# Patient Record
Sex: FEMALE | Race: WHITE | NOT HISPANIC OR LATINO | Employment: OTHER | URBAN - METROPOLITAN AREA
[De-identification: names, ages, dates, MRNs, and addresses within clinical notes are randomized per-mention and may not be internally consistent; named-entity substitution may affect disease eponyms.]

---

## 2017-03-08 DIAGNOSIS — N28.9 RENAL INSUFFICIENCY: ICD-10-CM

## 2017-03-08 LAB
ANION GAP SERPL CALCULATED.3IONS-SCNC: 8 MMOL/L (ref 3–14)
BUN SERPL-MCNC: 22 MG/DL (ref 7–30)
CALCIUM SERPL-MCNC: 9.2 MG/DL (ref 8.5–10.1)
CHLORIDE SERPL-SCNC: 108 MMOL/L (ref 94–109)
CO2 SERPL-SCNC: 26 MMOL/L (ref 20–32)
CREAT SERPL-MCNC: 0.85 MG/DL (ref 0.52–1.04)
CREAT UR-MCNC: 122 MG/DL
DEPRECATED CALCIDIOL+CALCIFEROL SERPL-MC: 24 UG/L (ref 20–75)
DIFFERENTIAL METHOD BLD: ABNORMAL
ERYTHROCYTE [DISTWIDTH] IN BLOOD BY AUTOMATED COUNT: 16 % (ref 10–15)
GFR SERPL CREATININE-BSD FRML MDRD: 63 ML/MIN/1.7M2
GLUCOSE SERPL-MCNC: 92 MG/DL (ref 70–99)
HCT VFR BLD AUTO: 40.7 % (ref 35–47)
HGB BLD-MCNC: 13.2 G/DL (ref 11.7–15.7)
LYMPHOCYTES # BLD AUTO: 2.1 10E9/L (ref 0.8–5.3)
LYMPHOCYTES NFR BLD AUTO: 45 %
MAGNESIUM SERPL-MCNC: 2 MG/DL (ref 1.6–2.3)
MCH RBC QN AUTO: 30.5 PG (ref 26.5–33)
MCHC RBC AUTO-ENTMCNC: 32.4 G/DL (ref 31.5–36.5)
MCV RBC AUTO: 94 FL (ref 78–100)
MICROALBUMIN UR-MCNC: 25 MG/L
MICROALBUMIN/CREAT UR: 20.82 MG/G CR (ref 0–25)
MONOCYTES # BLD AUTO: 1.1 10E9/L (ref 0–1.3)
MONOCYTES NFR BLD AUTO: 24 %
NEUTROPHILS # BLD AUTO: 1.5 10E9/L (ref 1.6–8.3)
NEUTROPHILS NFR BLD AUTO: 31 %
PHOSPHATE SERPL-MCNC: 2.3 MG/DL (ref 2.5–4.5)
PLATELET # BLD AUTO: 281 10E9/L (ref 150–450)
PLATELET # BLD EST: NORMAL 10*3/UL
POTASSIUM SERPL-SCNC: 3.5 MMOL/L (ref 3.4–5.3)
PTH-INTACT SERPL-MCNC: 25 PG/ML (ref 12–72)
RBC # BLD AUTO: 4.33 10E12/L (ref 3.8–5.2)
RBC MORPH BLD: ABNORMAL
SODIUM SERPL-SCNC: 142 MMOL/L (ref 133–144)
WBC # BLD AUTO: 4.7 10E9/L (ref 4–11)

## 2017-03-08 PROCEDURE — 82668 ASSAY OF ERYTHROPOIETIN: CPT | Mod: 90 | Performed by: INTERNAL MEDICINE

## 2017-03-08 PROCEDURE — 36415 COLL VENOUS BLD VENIPUNCTURE: CPT | Performed by: INTERNAL MEDICINE

## 2017-03-08 PROCEDURE — 84100 ASSAY OF PHOSPHORUS: CPT | Performed by: INTERNAL MEDICINE

## 2017-03-08 PROCEDURE — 83970 ASSAY OF PARATHORMONE: CPT | Performed by: INTERNAL MEDICINE

## 2017-03-08 PROCEDURE — 82306 VITAMIN D 25 HYDROXY: CPT | Performed by: INTERNAL MEDICINE

## 2017-03-08 PROCEDURE — 85025 COMPLETE CBC W/AUTO DIFF WBC: CPT | Performed by: INTERNAL MEDICINE

## 2017-03-08 PROCEDURE — 83735 ASSAY OF MAGNESIUM: CPT | Performed by: INTERNAL MEDICINE

## 2017-03-08 PROCEDURE — 80048 BASIC METABOLIC PNL TOTAL CA: CPT | Performed by: INTERNAL MEDICINE

## 2017-03-08 PROCEDURE — 99000 SPECIMEN HANDLING OFFICE-LAB: CPT | Performed by: INTERNAL MEDICINE

## 2017-03-08 PROCEDURE — 82043 UR ALBUMIN QUANTITATIVE: CPT | Performed by: INTERNAL MEDICINE

## 2017-03-09 LAB — EPO SERPL-ACNC: 9

## 2017-03-15 ENCOUNTER — OFFICE VISIT (OUTPATIENT)
Dept: OTHER | Facility: CLINIC | Age: 82
End: 2017-03-15
Attending: INTERNAL MEDICINE
Payer: MEDICARE

## 2017-03-15 VITALS
OXYGEN SATURATION: 97 % | DIASTOLIC BLOOD PRESSURE: 68 MMHG | BODY MASS INDEX: 22.14 KG/M2 | SYSTOLIC BLOOD PRESSURE: 128 MMHG | WEIGHT: 129 LBS | HEART RATE: 65 BPM

## 2017-03-15 DIAGNOSIS — I10 ESSENTIAL HYPERTENSION WITH GOAL BLOOD PRESSURE LESS THAN 130/80: ICD-10-CM

## 2017-03-15 DIAGNOSIS — N28.9 RENAL INSUFFICIENCY: ICD-10-CM

## 2017-03-15 DIAGNOSIS — E78.5 HYPERLIPIDEMIA LDL GOAL <100: ICD-10-CM

## 2017-03-15 DIAGNOSIS — M85.80 OSTEOPENIA: ICD-10-CM

## 2017-03-15 PROCEDURE — 99214 OFFICE O/P EST MOD 30 MIN: CPT | Mod: ZP | Performed by: INTERNAL MEDICINE

## 2017-03-15 PROCEDURE — 99211 OFF/OP EST MAY X REQ PHY/QHP: CPT

## 2017-03-15 NOTE — MR AVS SNAPSHOT
After Visit Summary   3/15/2017    Lori Fall    MRN: [id number]           Patient Information     Date Of Birth          6/22/1929        Visit Information        Provider Department      3/15/2017 10:00 AM Alcides Guzman MD [facility]      Today's Diagnoses     Renal insufficiency        Hyperlipidemia LDL goal <100        Osteopenia        Essential hypertension with goal blood pressure less than 130/80           Follow-ups after your visit        Additional Services     Follow-Up with Vascular Medicine                 Your next 10 appointments already scheduled     Apr 17, 2017  9:45 AM CDT   MA SCREENING DIGITAL BILATERAL with OXMA1   [facility] ([facility])    [address] [address] [phone]           Do not use any powder, lotion or deodorant under your arms or on your breast. If you do, we will ask you to remove it before your exam.  Wear comfortable, two-piece clothing.  If you have any allergies, tell your care team.  Bring any previous mammograms from other facilities or have them mailed to the breast center.              Who to contact     If you have questions or need follow up information about today's clinic visit or your schedule please contact [facility] directly at [phone].  Normal or non-critical lab and imaging results will be communicated to you by MyChart, letter or phone within 4 business days after the clinic has received the results. If you do not hear from us within 7 days, please contact the clinic through MyChart or phone. If you have a critical or abnormal lab result, we will notify you by phone as soon as possible.  Submit refill requests through [employer] or call your pharmacy and they will forward the refill request to us. Please allow 3 business days for your refill to be  "completed.          Additional Information About Your Visit        [employer] Information     [employer] lets you send messages to your doctor, view your test results, renew your prescriptions, schedule appointments and more. To sign up, go to [email or web address] . Click on \"Log in\" on the left side of the screen, which will take you to the Welcome page. Then click on \"Sign up Now\" on the right side of the page.     You will be asked to enter the access code listed below, as well as some personal information. Please follow the directions to create your username and password.     Your access code is: [email or web address]  Expires: 2017  6:01 PM     Your access code will  in 90 days. If you need help or a new code, please call your [place] clinic or [phone].        Care EveryWhere ID     This is your Care EveryWhere ID. This could be used by other organizations to access your [place] medical records  [id number]        Your Vitals Were     Pulse Pulse Oximetry Breastfeeding? BMI (Body Mass Index)          65 97% No 22.14 kg/m2         Blood Pressure from Last 3 Encounters:   03/15/17 128/68   16 128/78   16 127/65    Weight from Last 3 Encounters:   03/15/17 129 lb (58.5 kg)   16 126 lb (57.2 kg)   16 130 lb (59 kg)              We Performed the Following     Follow-Up with Vascular Medicine        Primary Care Provider Office Phone # Fax #    Alcides Guzman [phone]       [facility] [address]  [facility] [address]        Thank you!     Thank you for choosing [facility] [place]  for your care. Our goal is always to provide you with excellent care. Hearing back from our patients is one way we can continue to improve our services. Please take a few minutes to complete the written survey that you may receive in the mail after your visit with us. Thank you!             Your Updated Medication List - Protect others around you: " Learn how to safely use, store and throw away your medicines at www.disposemymeds.org.          This list is accurate as of: 3/15/17 11:59 PM.  Always use your most recent med list.                   Brand Name Dispense Instructions for use    alendronate 35 MG tablet    FOSAMAX    13 tablet    Take 1 tablet (35 mg) by mouth once a week       amLODIPine 10 MG tablet    NORVASC    90 tablet    Take 1 tablet (10 mg) by mouth daily       aspirin 81 MG tablet     90 tablet    Take 1 tablet by mouth daily.       calcium + D 600-200 MG-UNIT Tabs   Generic drug:  calcium carbonate-vitamin D      Take 2 tablets by mouth 2 times daily.       ezetimibe 10 MG tablet    ZETIA    90 tablet    Take 1 tablet (10 mg) by mouth At Bedtime       fenofibrate 160 MG tablet     90 tablet    Take 1 tablet (160 mg) by mouth daily       furosemide 40 MG tablet    LASIX    90 tablet    Take 1 tablet (40 mg) by mouth every morning       lisinopril 40 MG tablet    PRINIVIL/ZESTRIL    90 tablet    Take 1 tablet (40 mg) by mouth daily       metoprolol 100 MG tablet    LOPRESSOR    180 tablet    Take 1 tablet (100 mg) by mouth 2 times daily       simvastatin 40 MG tablet    ZOCOR    90 tablet    Take 1 tablet (40 mg) by mouth At Bedtime

## 2017-03-15 NOTE — PROGRESS NOTES
Lori Fall is a 87 year old female who is presenting at the current time to discuss her diagnosi(es) of renal insufficiency, htn, hyperlipidemia.      Review Of Systems  Skin: negative  Eyes: negative  Ears/Nose/Throat: negative  Respiratory: No shortness of breath, dyspnea on exertion, cough, or hemoptysis  Cardiovascular: negative  Gastrointestinal: negative  Genitourinary: negative  Musculoskeletal: negative  Neurologic: negative  Psychiatric: negative  Hematologic/Lymphatic/Immunologic: negative  Endocrine: negative    PAST MEDICAL HISTORY:                  Past Medical History   Diagnosis Date     Essential hypertension, benign      abstracted 7/5/02     Hyperlipidemia LDL goal < 100      Impaired fasting glucose      NONSPECIFIC MEDICAL HISTORY      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     Osteopenia      Primary thrombocytopenia 1995     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     PUD (peptic ulcer disease) 1960s     Pure hypercholesterolemia      abstracted 7/5/02       PAST SURGICAL HISTORY:                  Past Surgical History   Procedure Laterality Date     C nonspecific procedure  1995     splenectomy -- abstracted 7/5/02     C nonspecific procedure  1977     EDWIN  BSO appendectomy     C nonspecific procedure  1994     benign breast bopsy     Tonsillectomy & adenoidectomy  1956     C nonspecific procedure  6/28/2010     Left total hip arthroplasty, Bijal uncemented components       CURRENT MEDICATIONS:                  Current Outpatient Prescriptions   Medication Sig Dispense Refill     amLODIPine (NORVASC) 10 MG tablet Take 1 tablet (10 mg) by mouth daily 90 tablet 3     alendronate (FOSAMAX) 35 MG tablet Take 1 tablet (35 mg) by mouth once a week 13 tablet 3     fenofibrate 160 MG tablet Take 1 tablet (160 mg) by mouth daily 90 tablet 3     lisinopril (PRINIVIL/ZESTRIL) 40 MG tablet Take 1 tablet (40 mg) by mouth daily 90 tablet 3     metoprolol (LOPRESSOR) 100 MG tablet Take 1 tablet (100 mg)  by mouth 2 times daily 180 tablet 3     simvastatin (ZOCOR) 40 MG tablet Take 1 tablet (40 mg) by mouth At Bedtime 90 tablet 3     ezetimibe (ZETIA) 10 MG tablet Take 1 tablet (10 mg) by mouth At Bedtime 90 tablet 3     aspirin 81 MG tablet Take 1 tablet by mouth daily. 90 tablet 3     Calcium Carbonate-Vitamin D (CALCIUM + D) 600-200 MG-UNIT per tablet Take 2 tablets by mouth 2 times daily.       furosemide (LASIX) 40 MG tablet Take 1 tablet (40 mg) by mouth every morning (Patient not taking: Reported on 3/15/2017) 90 tablet 3       ALLERGIES:                  Allergies   Allergen Reactions     Aminoglycosides      neosporin onintment - rash     Hctz Hives     Penicillins      Sulfa Drugs        SOCIAL HISTORY:                  Social History     Social History     Marital status:      Spouse name: Nhan     Number of children: 1     Years of education: 12     Occupational History     retired      retired       Retired     Social History Main Topics     Smoking status: Never Smoker     Smokeless tobacco: Never Used     Alcohol use 0.0 oz/week     0 Standard drinks or equivalent per week      Comment: occasional wine     Drug use: No     Sexual activity: Yes     Partners: Male     Other Topics Concern     Not on file     Social History Narrative       FAMILY HISTORY:                   Family History   Problem Relation Age of Onset     HEART DISEASE Mother      d:age 66     HEART DISEASE Father      d:age 62     Family History Negative Brother      d:age 35  in war     CANCER Brother      d:age 53 cancer esophagus, alcoholic     HEART DISEASE Brother      b:1933 bypass surgery       Physical exam Reveals:    O/P: WNL  HEENT: WNL  NECK: No JVD, thyromegaly, or lymphadenopathy  HEART: RRR, no murmurs, gallops, or rubs  LUNGS: CTA bilaterally without rales, wheezes, or rhonchi  GI: NABS, nondistended, nontender, soft  EXT:without cyanosis, clubbing, or edema  NEURO: nonfocal  : no flank  tenderness      A/P:    (N28.9) Renal insufficiency  Comment: resolved  Plan: Follow-Up with Vascular Medicine, Basic         metabolic panel        Monitor labs in six months    (E78.5) Hyperlipidemia LDL goal <100  Comment: at goal  Plan: Follow-Up with Vascular Medicine, T4 free, T3         Free, TSH, Basic metabolic panel, Hepatic         panel, Lipid Profile, Hemoglobin A1c           (I10) Essential hypertension with goal blood pressure less than 130/80  Comment: at goal  Plan: Follow-Up with Vascular Medicine, Basic         metabolic panel        RTC six months

## 2017-05-03 ENCOUNTER — HOSPITAL ENCOUNTER (OUTPATIENT)
Dept: CT IMAGING | Facility: CLINIC | Age: 82
DRG: 378 | End: 2017-05-03
Attending: INTERNAL MEDICINE
Payer: MEDICARE

## 2017-05-03 ENCOUNTER — HOSPITAL ENCOUNTER (OUTPATIENT)
Dept: ULTRASOUND IMAGING | Facility: CLINIC | Age: 82
DRG: 378 | End: 2017-05-03
Attending: INTERNAL MEDICINE
Payer: MEDICARE

## 2017-05-03 ENCOUNTER — HOSPITAL ENCOUNTER (OUTPATIENT)
Dept: LAB | Facility: CLINIC | Age: 82
DRG: 378 | End: 2017-05-03
Attending: INTERNAL MEDICINE
Payer: MEDICARE

## 2017-05-03 ENCOUNTER — OFFICE VISIT (OUTPATIENT)
Dept: OTHER | Facility: CLINIC | Age: 82
DRG: 378 | End: 2017-05-03
Attending: INTERNAL MEDICINE
Payer: MEDICARE

## 2017-05-03 VITALS
WEIGHT: 125.6 LBS | SYSTOLIC BLOOD PRESSURE: 125 MMHG | HEART RATE: 72 BPM | BODY MASS INDEX: 21.56 KG/M2 | DIASTOLIC BLOOD PRESSURE: 65 MMHG | OXYGEN SATURATION: 98 %

## 2017-05-03 DIAGNOSIS — R10.13 ABDOMINAL PAIN, EPIGASTRIC: ICD-10-CM

## 2017-05-03 DIAGNOSIS — R10.13 ABDOMINAL PAIN, EPIGASTRIC: Primary | ICD-10-CM

## 2017-05-03 DIAGNOSIS — R00.0 BOUNDING PULSE: ICD-10-CM

## 2017-05-03 LAB
ALBUMIN SERPL-MCNC: 3.5 G/DL (ref 3.4–5)
ALBUMIN UR-MCNC: NEGATIVE MG/DL
ALP SERPL-CCNC: 45 U/L (ref 40–150)
ALT SERPL W P-5'-P-CCNC: 21 U/L (ref 0–50)
AMORPH CRY #/AREA URNS HPF: ABNORMAL /HPF
AMYLASE SERPL-CCNC: 45 U/L (ref 30–110)
ANION GAP SERPL CALCULATED.3IONS-SCNC: 3 MMOL/L (ref 3–14)
APPEARANCE UR: ABNORMAL
APTT PPP: 24 SEC (ref 22–37)
AST SERPL W P-5'-P-CCNC: 24 U/L (ref 0–45)
BASOPHILS # BLD AUTO: 0 10E9/L (ref 0–0.2)
BASOPHILS NFR BLD AUTO: 0.2 %
BILIRUB DIRECT SERPL-MCNC: 0.1 MG/DL (ref 0–0.2)
BILIRUB SERPL-MCNC: 0.5 MG/DL (ref 0.2–1.3)
BILIRUB UR QL STRIP: NEGATIVE
BUN SERPL-MCNC: 32 MG/DL (ref 7–30)
CALCIUM SERPL-MCNC: 11.4 MG/DL (ref 8.5–10.1)
CHLORIDE SERPL-SCNC: 102 MMOL/L (ref 94–109)
CO2 SERPL-SCNC: 34 MMOL/L (ref 20–32)
COLOR UR AUTO: ABNORMAL
CREAT SERPL-MCNC: 0.93 MG/DL (ref 0.52–1.04)
DIFFERENTIAL METHOD BLD: ABNORMAL
EOSINOPHIL # BLD AUTO: 0 10E9/L (ref 0–0.7)
EOSINOPHIL NFR BLD AUTO: 0.1 %
ERYTHROCYTE [DISTWIDTH] IN BLOOD BY AUTOMATED COUNT: 15.2 % (ref 10–15)
GFR SERPL CREATININE-BSD FRML MDRD: 57 ML/MIN/1.7M2
GLUCOSE SERPL-MCNC: 117 MG/DL (ref 70–99)
GLUCOSE UR STRIP-MCNC: NEGATIVE MG/DL
GRAN CASTS #/AREA URNS LPF: 1 /LPF
HCT VFR BLD AUTO: 36.4 % (ref 35–47)
HGB BLD-MCNC: 12.2 G/DL (ref 11.7–15.7)
HGB UR QL STRIP: NEGATIVE
HYALINE CASTS #/AREA URNS LPF: 4 /LPF (ref 0–2)
IMM GRANULOCYTES # BLD: 0 10E9/L (ref 0–0.4)
IMM GRANULOCYTES NFR BLD: 0.3 %
INR PPP: 1.02 (ref 0.86–1.14)
KETONES UR STRIP-MCNC: NEGATIVE MG/DL
LEUKOCYTE ESTERASE UR QL STRIP: ABNORMAL
LIPASE SERPL-CCNC: 358 U/L (ref 73–393)
LYMPHOCYTES # BLD AUTO: 1.9 10E9/L (ref 0.8–5.3)
LYMPHOCYTES NFR BLD AUTO: 16.4 %
MCH RBC QN AUTO: 30.7 PG (ref 26.5–33)
MCHC RBC AUTO-ENTMCNC: 33.5 G/DL (ref 31.5–36.5)
MCV RBC AUTO: 92 FL (ref 78–100)
MONOCYTES # BLD AUTO: 1.7 10E9/L (ref 0–1.3)
MONOCYTES NFR BLD AUTO: 14 %
MUCOUS THREADS #/AREA URNS LPF: PRESENT /LPF
NEUTROPHILS # BLD AUTO: 8.2 10E9/L (ref 1.6–8.3)
NEUTROPHILS NFR BLD AUTO: 69 %
NITRATE UR QL: NEGATIVE
NRBC # BLD AUTO: 0 10*3/UL
NRBC BLD AUTO-RTO: 0 /100
PH UR STRIP: 6.5 PH (ref 5–7)
PLATELET # BLD AUTO: 303 10E9/L (ref 150–450)
POTASSIUM SERPL-SCNC: 3.5 MMOL/L (ref 3.4–5.3)
PROT SERPL-MCNC: 6.7 G/DL (ref 6.8–8.8)
RBC # BLD AUTO: 3.97 10E12/L (ref 3.8–5.2)
RBC #/AREA URNS AUTO: 1 /HPF (ref 0–2)
SODIUM SERPL-SCNC: 139 MMOL/L (ref 133–144)
SP GR UR STRIP: 1.01 (ref 1–1.03)
SQUAMOUS #/AREA URNS AUTO: 1 /HPF (ref 0–1)
URN SPEC COLLECT METH UR: ABNORMAL
UROBILINOGEN UR STRIP-MCNC: NORMAL MG/DL (ref 0–2)
WBC # BLD AUTO: 11.8 10E9/L (ref 4–11)
WBC #/AREA URNS AUTO: 31 /HPF (ref 0–2)

## 2017-05-03 PROCEDURE — 99215 OFFICE O/P EST HI 40 MIN: CPT | Mod: ZP | Performed by: INTERNAL MEDICINE

## 2017-05-03 PROCEDURE — 93010 ELECTROCARDIOGRAM REPORT: CPT | Mod: ZP | Performed by: INTERNAL MEDICINE

## 2017-05-03 RX ORDER — IOPAMIDOL 755 MG/ML
75 INJECTION, SOLUTION INTRAVASCULAR ONCE
Status: COMPLETED | OUTPATIENT
Start: 2017-05-03 | End: 2017-05-03

## 2017-05-03 RX ADMIN — IOPAMIDOL 75 ML: 755 INJECTION, SOLUTION INTRAVENOUS at 14:03

## 2017-05-03 RX ADMIN — SODIUM CHLORIDE 70 ML: 9 INJECTION, SOLUTION INTRAVENOUS at 14:03

## 2017-05-03 NOTE — NURSING NOTE
"Chief Complaint   Patient presents with     RECHECK     severe upper abdominal/back pain       Initial /65 (BP Location: Right arm, Patient Position: Chair, Cuff Size: Adult Large)  Pulse 72  Wt 125 lb 9.6 oz (57 kg)  SpO2 98%  BMI 21.56 kg/m2 Estimated body mass index is 21.56 kg/(m^2) as calculated from the following:    Height as of 6/29/16: 5' 4\" (1.626 m).    Weight as of this encounter: 125 lb 9.6 oz (57 kg).  Medication Reconciliation: complete     Face to Face nursing time 7 minutes     Kaci Morales CMA      "

## 2017-05-03 NOTE — PROGRESS NOTES
Lori Fall is a 87 year old female who is presenting at the current time to discuss her one week history of nonexertional burning epigastric pain associated with radiation through to the back, with associated pyrosis and nausea but not emesis. Pain has been provoked by eating and occurs within one hour of eating. It is not relieved by eating, and has in fact caused her to eat less. It is not provoked with activity. No fevers, chills, night sweats, emesis, diaphoresis.      Review Of Systems  Skin: negative  Eyes: negative  Ears/Nose/Throat: negative  Respiratory: No shortness of breath, dyspnea on exertion, cough, or hemoptysis  Cardiovascular: negative  Gastrointestinal: as above  Genitourinary: negative  Musculoskeletal: negative  Neurologic: negative  Psychiatric: negative  Hematologic/Lymphatic/Immunologic: negative  Endocrine: negative    PAST MEDICAL HISTORY:                  Past Medical History:   Diagnosis Date     Essential hypertension, benign     abstracted 7/5/02     Hyperlipidemia LDL goal < 100      Impaired fasting glucose      NONSPECIFIC MEDICAL HISTORY     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     Osteopenia      Primary thrombocytopenia 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     PUD (peptic ulcer disease) 1960s     Pure hypercholesterolemia     abstracted 7/5/02       PAST SURGICAL HISTORY:                  Past Surgical History:   Procedure Laterality Date     C NONSPECIFIC PROCEDURE  1995    splenectomy -- abstracted 7/5/02     C NONSPECIFIC PROCEDURE  1977    EDWIN  BSO appendectomy     C NONSPECIFIC PROCEDURE  1994    benign breast bopsy     C NONSPECIFIC PROCEDURE  6/28/2010    Left total hip arthroplasty, Bijal uncemented components     TONSILLECTOMY & ADENOIDECTOMY  1956       CURRENT MEDICATIONS:                  Current Outpatient Prescriptions   Medication Sig Dispense Refill     amLODIPine (NORVASC) 10 MG tablet Take 1 tablet (10 mg) by mouth daily 90 tablet 3      alendronate (FOSAMAX) 35 MG tablet Take 1 tablet (35 mg) by mouth once a week 13 tablet 3     fenofibrate 160 MG tablet Take 1 tablet (160 mg) by mouth daily 90 tablet 3     lisinopril (PRINIVIL/ZESTRIL) 40 MG tablet Take 1 tablet (40 mg) by mouth daily 90 tablet 3     metoprolol (LOPRESSOR) 100 MG tablet Take 1 tablet (100 mg) by mouth 2 times daily 180 tablet 3     simvastatin (ZOCOR) 40 MG tablet Take 1 tablet (40 mg) by mouth At Bedtime 90 tablet 3     ezetimibe (ZETIA) 10 MG tablet Take 1 tablet (10 mg) by mouth At Bedtime 90 tablet 3     aspirin 81 MG tablet Take 1 tablet by mouth daily. 90 tablet 3     Calcium Carbonate-Vitamin D (CALCIUM + D) 600-200 MG-UNIT per tablet Take 2 tablets by mouth 2 times daily.         ALLERGIES:                  Allergies   Allergen Reactions     Aminoglycosides      neosporin onintment - rash     Hctz Hives     Penicillins      Sulfa Drugs        SOCIAL HISTORY:                  Social History     Social History     Marital status:      Spouse name: Nhan     Number of children: 1     Years of education: 12     Occupational History     retired      retired       Retired     Social History Main Topics     Smoking status: Never Smoker     Smokeless tobacco: Never Used     Alcohol use 0.0 oz/week     0 Standard drinks or equivalent per week      Comment: occasional wine     Drug use: No     Sexual activity: Yes     Partners: Male     Other Topics Concern     Not on file     Social History Narrative       FAMILY HISTORY:                   Family History   Problem Relation Age of Onset     HEART DISEASE Mother      d:age 66     HEART DISEASE Father      d:age 62     Family History Negative Brother      d:age 35  in war     CANCER Brother      d:age 53 cancer esophagus, alcoholic     HEART DISEASE Brother      b:1933 bypass surgery       Physical exam Reveals:    O/P: WNL  HEENT: WNL  NECK: No JVD, thyromegaly, or lymphadenopathy  HEART: RRR, no murmurs,  gallops, or rubs  LUNGS: CTA bilaterally without rales, wheezes, or rhonchi  GI: NABS, nondistended, nontender, soft, negative Chan's sign. No McBurney's point tenderness  EXT:without cyanosis, clubbing, or edema  NEURO: nonfocal  : no flank tenderness       A/P:    (R10.13) Abdominal pain, epigastric  (primary encounter diagnosis)  Comment: I am concerned about the possibility (in this order) of biliary colic, pancreatitis, aortic dissection, atypical angina due to RCA disease. We will start by ordering the below tests as stat tests.  Plan: CBC with platelets differential, Hepatic panel,        Basic metabolic panel, UA with Microscopic,         INR, Partial thromboplastin time, Lipase,         Amylase, US Abdomen Complete, EKG 12-lead         complete w/read - Clinics, EKG 12-lead complete        w/read - Clinics          If the above do not yield an actionable diagnosis, I will order a stat CT chest / abdomen / pelvis with contrast to investigate matters further.             (R00.0) Bounding right femoral pulse  Comment: 4 plus right femoral pulse, one plus left femoral pulse  Plan: check RLE arterial duplex once above w/u completed if no obvious vascular etiology to discomfort has occurred         Greater than one half the fortyfive minutes total spent on the pt's visit were spent providing education and counselling to the patient regarding the above matters.

## 2017-05-03 NOTE — MR AVS SNAPSHOT
After Visit Summary   5/3/2017    Lori Fall    MRN: [id number]           Patient Information     Date Of Birth          6/22/1929        Visit Information        Provider Department      5/3/2017 11:30 AM Alcides Guzman MD [facility]      Today's Diagnoses     Abdominal pain, epigastric    -  1    Bounding right femoral pulse           Follow-ups after your visit        Your next 10 appointments already scheduled     May 10, 2017 10:30 AM CDT   Return Visit with Alcides Guzman MD   [facility] ([facility])    [address]  [facility] [id number] [phone]              Future tests that were ordered for you today     Open Future Orders        Priority Expected Expires Ordered    D dimer quantitative Routine 5/3/2017 5/3/2017 5/3/2017    Troponin I Routine 5/3/2017 5/3/2017 5/3/2017    CT Chest Abdomen Pelvis w/o & w Contrast STAT 5/3/2017 5/3/2018 5/3/2017    EKG 12-lead complete w/read - Clinics STAT 5/3/2017 5/3/2017 5/3/2017    CBC with platelets differential STAT 5/3/2017 5/3/2017 5/3/2017    Hepatic panel STAT 5/3/2017 5/3/2017 5/3/2017    Basic metabolic panel STAT 5/3/2017 5/3/2017 5/3/2017    UA with Microscopic STAT 5/3/2017 5/3/2017 5/3/2017    INR STAT 5/3/2017 5/3/2017 5/3/2017    Partial thromboplastin time STAT 5/3/2017 5/3/2017 5/3/2017    Lipase STAT 5/3/2017 5/3/2017 5/3/2017    Amylase STAT 5/3/2017 5/3/2017 5/3/2017    US Abdomen Complete STAT 5/3/2017 5/3/2017 5/3/2017            Who to contact     If you have questions or need follow up information about today's clinic visit or your schedule please contact [facility] directly at [phone].  Normal or non-critical lab and imaging results will be communicated to you by MyChart, letter or phone within 4 business days after the  "clinic has received the results. If you do not hear from us within 7 days, please contact the clinic through [employer] or phone. If you have a critical or abnormal lab result, we will notify you by phone as soon as possible.  Submit refill requests through [employer] or call your pharmacy and they will forward the refill request to us. Please allow 3 business days for your refill to be completed.          Additional Information About Your Visit        [employer] Information     [employer] lets you send messages to your doctor, view your test results, renew your prescriptions, schedule appointments and more. To sign up, go to [email or web address] . Click on \"Log in\" on the left side of the screen, which will take you to the Welcome page. Then click on \"Sign up Now\" on the right side of the page.     You will be asked to enter the access code listed below, as well as some personal information. Please follow the directions to create your username and password.     Your access code is: [email or web address]  Expires: 2017  6:01 PM     Your access code will  in 90 days. If you need help or a new code, please call your [place] clinic or [phone].        Care EveryWhere ID     This is your Care EveryWhere ID. This could be used by other organizations to access your [place] medical records  [id number]        Your Vitals Were     Pulse Pulse Oximetry BMI (Body Mass Index)             72 98% 21.56 kg/m2          Blood Pressure from Last 3 Encounters:   17 125/65   03/15/17 128/68   16 128/78    Weight from Last 3 Encounters:   17 125 lb 9.6 oz (57 kg)   03/15/17 129 lb (58.5 kg)   16 126 lb (57.2 kg)              We Performed the Following     EKG 12-lead complete w/read - Clinics     Urine Culture Aerobic Bacterial        Primary Care Provider Office Phone # Fax #    Alcides Guzman [phone]       [facility] [address]  [place] [address]        Thank " you!     Thank you for choosing [facility]  for your care. Our goal is always to provide you with excellent care. Hearing back from our patients is one way we can continue to improve our services. Please take a few minutes to complete the written survey that you may receive in the mail after your visit with us. Thank you!             Your Updated Medication List - Protect others around you: Learn how to safely use, store and throw away your medicines at www.disposemymeds.org.          This list is accurate as of: 5/3/17  3:10 PM.  Always use your most recent med list.                   Brand Name Dispense Instructions for use    alendronate 35 MG tablet    FOSAMAX    13 tablet    Take 1 tablet (35 mg) by mouth once a week       amLODIPine 10 MG tablet    NORVASC    90 tablet    Take 1 tablet (10 mg) by mouth daily       aspirin 81 MG tablet     90 tablet    Take 1 tablet by mouth daily.       calcium + D 600-200 MG-UNIT Tabs   Generic drug:  calcium carbonate-vitamin D      Take 2 tablets by mouth 2 times daily.       ezetimibe 10 MG tablet    ZETIA    90 tablet    Take 1 tablet (10 mg) by mouth At Bedtime       fenofibrate 160 MG tablet     90 tablet    Take 1 tablet (160 mg) by mouth daily       lisinopril 40 MG tablet    PRINIVIL/ZESTRIL    90 tablet    Take 1 tablet (40 mg) by mouth daily       metoprolol 100 MG tablet    LOPRESSOR    180 tablet    Take 1 tablet (100 mg) by mouth 2 times daily       simvastatin 40 MG tablet    ZOCOR    90 tablet    Take 1 tablet (40 mg) by mouth At Bedtime

## 2017-05-04 ENCOUNTER — TELEPHONE (OUTPATIENT)
Dept: OTHER | Facility: CLINIC | Age: 82
End: 2017-05-04

## 2017-05-04 DIAGNOSIS — K27.9 PEPTIC ULCER: Primary | ICD-10-CM

## 2017-05-04 LAB
BACTERIA SPEC CULT: NORMAL
Lab: NORMAL
MICRO REPORT STATUS: NORMAL
SPECIMEN SOURCE: NORMAL

## 2017-05-04 RX ORDER — OMEPRAZOLE 40 MG/1
40 CAPSULE, DELAYED RELEASE ORAL 2 TIMES DAILY
Qty: 20 CAPSULE | Refills: 0 | Status: ON HOLD | OUTPATIENT
Start: 2017-05-04 | End: 2017-05-08

## 2017-05-06 ENCOUNTER — HOSPITAL ENCOUNTER (INPATIENT)
Facility: CLINIC | Age: 82
LOS: 2 days | Discharge: HOME | DRG: 378 | End: 2017-05-08
Attending: EMERGENCY MEDICINE | Admitting: INTERNAL MEDICINE
Payer: MEDICARE

## 2017-05-06 DIAGNOSIS — K92.2 GI BLEED: Primary | ICD-10-CM

## 2017-05-06 DIAGNOSIS — K25.4 GASTROINTESTINAL HEMORRHAGE ASSOCIATED WITH GASTRIC ULCER: ICD-10-CM

## 2017-05-06 LAB
ALBUMIN UR-MCNC: NEGATIVE MG/DL
ANION GAP SERPL CALCULATED.3IONS-SCNC: 7 MMOL/L (ref 3–14)
APPEARANCE UR: CLEAR
BASOPHILS # BLD AUTO: 0 10E9/L (ref 0–0.2)
BASOPHILS NFR BLD AUTO: 0.3 %
BILIRUB UR QL STRIP: NEGATIVE
BUN SERPL-MCNC: 34 MG/DL (ref 7–30)
CALCIUM SERPL-MCNC: 9.1 MG/DL (ref 8.5–10.1)
CHLORIDE SERPL-SCNC: 103 MMOL/L (ref 94–109)
CO2 SERPL-SCNC: 28 MMOL/L (ref 20–32)
COLOR UR AUTO: YELLOW
CREAT SERPL-MCNC: 1.12 MG/DL (ref 0.52–1.04)
DIFFERENTIAL METHOD BLD: ABNORMAL
EOSINOPHIL # BLD AUTO: 0.1 10E9/L (ref 0–0.7)
EOSINOPHIL NFR BLD AUTO: 1.3 %
ERYTHROCYTE [DISTWIDTH] IN BLOOD BY AUTOMATED COUNT: 15.3 % (ref 10–15)
GFR SERPL CREATININE-BSD FRML MDRD: 46 ML/MIN/1.7M2
GLUCOSE SERPL-MCNC: 117 MG/DL (ref 70–99)
GLUCOSE UR STRIP-MCNC: NEGATIVE MG/DL
HCT VFR BLD AUTO: 29.4 % (ref 35–47)
HEMOCCULT STL QL: POSITIVE
HGB BLD-MCNC: 9.7 G/DL (ref 11.7–15.7)
HGB UR QL STRIP: NEGATIVE
IMM GRANULOCYTES # BLD: 0 10E9/L (ref 0–0.4)
IMM GRANULOCYTES NFR BLD: 0.4 %
INTERPRETATION ECG - MUSE: NORMAL
KETONES UR STRIP-MCNC: NEGATIVE MG/DL
LACTATE BLD-SCNC: 1.1 MMOL/L (ref 0.7–2.1)
LEUKOCYTE ESTERASE UR QL STRIP: NEGATIVE
LYMPHOCYTES # BLD AUTO: 1.7 10E9/L (ref 0.8–5.3)
LYMPHOCYTES NFR BLD AUTO: 23.9 %
MAGNESIUM SERPL-MCNC: 1.8 MG/DL (ref 1.6–2.3)
MCH RBC QN AUTO: 29.9 PG (ref 26.5–33)
MCHC RBC AUTO-ENTMCNC: 33 G/DL (ref 31.5–36.5)
MCV RBC AUTO: 91 FL (ref 78–100)
MONOCYTES # BLD AUTO: 0.9 10E9/L (ref 0–1.3)
MONOCYTES NFR BLD AUTO: 12.6 %
NEUTROPHILS # BLD AUTO: 4.4 10E9/L (ref 1.6–8.3)
NEUTROPHILS NFR BLD AUTO: 61.5 %
NITRATE UR QL: NEGATIVE
PH UR STRIP: 7 PH (ref 5–7)
PLATELET # BLD AUTO: 224 10E9/L (ref 150–450)
POTASSIUM SERPL-SCNC: 4 MMOL/L (ref 3.4–5.3)
RBC # BLD AUTO: 3.24 10E12/L (ref 3.8–5.2)
SODIUM SERPL-SCNC: 138 MMOL/L (ref 133–144)
SP GR UR STRIP: 1.01 (ref 1–1.03)
TROPONIN I SERPL-MCNC: 0.02 UG/L (ref 0–0.04)
URN SPEC COLLECT METH UR: NORMAL
UROBILINOGEN UR STRIP-ACNC: 0.2 EU/DL (ref 0.2–1)
WBC # BLD AUTO: 7.1 10E9/L (ref 4–11)

## 2017-05-06 PROCEDURE — S0164 INJECTION PANTROPRAZOLE: HCPCS | Performed by: EMERGENCY MEDICINE

## 2017-05-06 PROCEDURE — 12000000 ZZH R&B MED SURG/OB

## 2017-05-06 PROCEDURE — 86901 BLOOD TYPING SEROLOGIC RH(D): CPT | Performed by: PHYSICIAN ASSISTANT

## 2017-05-06 PROCEDURE — 99223 1ST HOSP IP/OBS HIGH 75: CPT | Mod: AI | Performed by: INTERNAL MEDICINE

## 2017-05-06 PROCEDURE — 86922 COMPATIBILITY TEST ANTIGLOB: CPT | Performed by: PHYSICIAN ASSISTANT

## 2017-05-06 PROCEDURE — 25000128 H RX IP 250 OP 636: Performed by: PHYSICIAN ASSISTANT

## 2017-05-06 PROCEDURE — 86870 RBC ANTIBODY IDENTIFICATION: CPT | Performed by: PHYSICIAN ASSISTANT

## 2017-05-06 PROCEDURE — 81003 URINALYSIS AUTO W/O SCOPE: CPT | Performed by: EMERGENCY MEDICINE

## 2017-05-06 PROCEDURE — 25000128 H RX IP 250 OP 636: Performed by: INTERNAL MEDICINE

## 2017-05-06 PROCEDURE — 86880 COOMBS TEST DIRECT: CPT | Performed by: PHYSICIAN ASSISTANT

## 2017-05-06 PROCEDURE — 83735 ASSAY OF MAGNESIUM: CPT | Performed by: EMERGENCY MEDICINE

## 2017-05-06 PROCEDURE — 85025 COMPLETE CBC W/AUTO DIFF WBC: CPT | Performed by: EMERGENCY MEDICINE

## 2017-05-06 PROCEDURE — 93005 ELECTROCARDIOGRAM TRACING: CPT

## 2017-05-06 PROCEDURE — 82272 OCCULT BLD FECES 1-3 TESTS: CPT | Performed by: EMERGENCY MEDICINE

## 2017-05-06 PROCEDURE — 80048 BASIC METABOLIC PNL TOTAL CA: CPT | Performed by: EMERGENCY MEDICINE

## 2017-05-06 PROCEDURE — 96365 THER/PROPH/DIAG IV INF INIT: CPT

## 2017-05-06 PROCEDURE — 83605 ASSAY OF LACTIC ACID: CPT | Performed by: EMERGENCY MEDICINE

## 2017-05-06 PROCEDURE — 25000132 ZZH RX MED GY IP 250 OP 250 PS 637: Mod: GY | Performed by: INTERNAL MEDICINE

## 2017-05-06 PROCEDURE — 25000125 ZZHC RX 250: Performed by: PHYSICIAN ASSISTANT

## 2017-05-06 PROCEDURE — A9270 NON-COVERED ITEM OR SERVICE: HCPCS | Mod: GY | Performed by: INTERNAL MEDICINE

## 2017-05-06 PROCEDURE — 25000125 ZZHC RX 250: Performed by: EMERGENCY MEDICINE

## 2017-05-06 PROCEDURE — 86850 RBC ANTIBODY SCREEN: CPT | Performed by: PHYSICIAN ASSISTANT

## 2017-05-06 PROCEDURE — 99285 EMERGENCY DEPT VISIT HI MDM: CPT | Mod: 25

## 2017-05-06 PROCEDURE — 96376 TX/PRO/DX INJ SAME DRUG ADON: CPT

## 2017-05-06 PROCEDURE — 84484 ASSAY OF TROPONIN QUANT: CPT | Performed by: EMERGENCY MEDICINE

## 2017-05-06 PROCEDURE — 25000128 H RX IP 250 OP 636: Performed by: EMERGENCY MEDICINE

## 2017-05-06 PROCEDURE — 96361 HYDRATE IV INFUSION ADD-ON: CPT

## 2017-05-06 PROCEDURE — 86900 BLOOD TYPING SEROLOGIC ABO: CPT | Performed by: PHYSICIAN ASSISTANT

## 2017-05-06 RX ORDER — BISACODYL 10 MG
10 SUPPOSITORY, RECTAL RECTAL DAILY PRN
Status: DISCONTINUED | OUTPATIENT
Start: 2017-05-06 | End: 2017-05-08 | Stop reason: HOSPADM

## 2017-05-06 RX ORDER — NALOXONE HYDROCHLORIDE 0.4 MG/ML
.1-.4 INJECTION, SOLUTION INTRAMUSCULAR; INTRAVENOUS; SUBCUTANEOUS
Status: DISCONTINUED | OUTPATIENT
Start: 2017-05-06 | End: 2017-05-08 | Stop reason: HOSPADM

## 2017-05-06 RX ORDER — PROCHLORPERAZINE 25 MG
12.5 SUPPOSITORY, RECTAL RECTAL EVERY 12 HOURS PRN
Status: DISCONTINUED | OUTPATIENT
Start: 2017-05-06 | End: 2017-05-08 | Stop reason: HOSPADM

## 2017-05-06 RX ORDER — AMOXICILLIN 250 MG
1-2 CAPSULE ORAL 2 TIMES DAILY PRN
Status: DISCONTINUED | OUTPATIENT
Start: 2017-05-06 | End: 2017-05-08 | Stop reason: HOSPADM

## 2017-05-06 RX ORDER — ONDANSETRON 2 MG/ML
4 INJECTION INTRAMUSCULAR; INTRAVENOUS EVERY 6 HOURS PRN
Status: DISCONTINUED | OUTPATIENT
Start: 2017-05-06 | End: 2017-05-08 | Stop reason: HOSPADM

## 2017-05-06 RX ORDER — PROCHLORPERAZINE MALEATE 5 MG
5 TABLET ORAL EVERY 6 HOURS PRN
Status: DISCONTINUED | OUTPATIENT
Start: 2017-05-06 | End: 2017-05-08 | Stop reason: HOSPADM

## 2017-05-06 RX ORDER — ONDANSETRON 4 MG/1
4 TABLET, ORALLY DISINTEGRATING ORAL EVERY 6 HOURS PRN
Status: DISCONTINUED | OUTPATIENT
Start: 2017-05-06 | End: 2017-05-08 | Stop reason: HOSPADM

## 2017-05-06 RX ORDER — SODIUM CHLORIDE 9 MG/ML
INJECTION, SOLUTION INTRAVENOUS CONTINUOUS
Status: DISCONTINUED | OUTPATIENT
Start: 2017-05-06 | End: 2017-05-08 | Stop reason: HOSPADM

## 2017-05-06 RX ORDER — SIMVASTATIN 40 MG
40 TABLET ORAL AT BEDTIME
Status: DISCONTINUED | OUTPATIENT
Start: 2017-05-06 | End: 2017-05-08 | Stop reason: HOSPADM

## 2017-05-06 RX ORDER — METOPROLOL TARTRATE 50 MG
50 TABLET ORAL 2 TIMES DAILY
Status: DISCONTINUED | OUTPATIENT
Start: 2017-05-06 | End: 2017-05-08 | Stop reason: HOSPADM

## 2017-05-06 RX ADMIN — PANTOPRAZOLE SODIUM 40 MG: 40 INJECTION, POWDER, FOR SOLUTION INTRAVENOUS at 14:04

## 2017-05-06 RX ADMIN — SODIUM CHLORIDE 1000 ML: 9 INJECTION, SOLUTION INTRAVENOUS at 14:02

## 2017-05-06 RX ADMIN — SODIUM CHLORIDE: 9 INJECTION, SOLUTION INTRAVENOUS at 16:53

## 2017-05-06 RX ADMIN — METOPROLOL TARTRATE 50 MG: 50 TABLET, FILM COATED ORAL at 22:12

## 2017-05-06 RX ADMIN — SODIUM CHLORIDE 8 MG/HR: 9 INJECTION, SOLUTION INTRAVENOUS at 15:32

## 2017-05-06 RX ADMIN — SODIUM CHLORIDE 8 MG/HR: 9 INJECTION, SOLUTION INTRAVENOUS at 16:39

## 2017-05-06 RX ADMIN — SIMVASTATIN 40 MG: 40 TABLET, FILM COATED ORAL at 22:12

## 2017-05-06 ASSESSMENT — ENCOUNTER SYMPTOMS
LIGHT-HEADEDNESS: 1
CHEST TIGHTNESS: 0
DYSURIA: 0
HEADACHES: 0
DIFFICULTY URINATING: 0
FEVER: 0
WEAKNESS: 1
FLANK PAIN: 0
VOMITING: 0
ABDOMINAL PAIN: 0
CONSTIPATION: 1
CHILLS: 0
COUGH: 0
SHORTNESS OF BREATH: 0
NAUSEA: 1
PALPITATIONS: 0

## 2017-05-06 NOTE — IP AVS SNAPSHOT
MRN:[id number]                      After Visit Summary   5/6/2017    Lori Fall    MRN: [id number]           Thank you!     Thank you for choosing [facility] for your care. Our goal is always to provide you with excellent care. Hearing back from our patients is one way we can continue to improve our services. Please take a few minutes to complete the written survey that you may receive in the mail after you visit with us. Thank you!        Patient Information     Date Of Birth          6/22/1929        Designated Caregiver       Most Recent Value    Caregiver    Will someone help with your care after discharge? no      About your hospital stay     You were admitted on:  May 6, 2017 You last received care in the:  [facility] [address] [facility]    You were discharged on:  May 8, 2017        Reason for your hospital stay       You were hospitalized secondary to a bleeding ulcer                  Who to Call     For medical emergencies, please call 911.  For non-urgent questions about your medical care, please call your primary care provider or clinic, [phone]  For questions related to your surgery, please call your surgery clinic        Attending Provider     Provider Specialty    Rolanda Juarez MD Emergency Medicine    Duong, Sheba Weaver MD Internal Medicine    Elizabeth Casas MD Internal Medicine       Primary Care Provider Office Phone # Fax #    Alcides Guzman [phone]       [facility] [address]  [place] [address]         When to contact your care team       Call your primary doctor if you have any of the following: increased pain or recurrent bloody or black stools.                  After Care Instructions     Activity       Your activity upon discharge: activity as tolerated            Diet       Follow this diet upon discharge: Orders Placed This Encounter      Regular Diet Adult            Discharge Instructions        "Please hold lisinopril until instructed to restart by your primary care doctor. Stop taking aspirin secondary to your ulcer.                  Follow-up Appointments     Follow Up and recommended labs and tests       Follow up with [facility] in 2 months to follow up your stomach ulcer.            Follow-up and recommended labs and tests        Follow up with primary care provider, Alcides Guzman, on 5/10/2017 for hospital follow- up.  The following labs/tests are recommended: hemoglobin.                  Your next 10 appointments already scheduled     May 10, 2017 10:30 AM CDT   Return Visit with Alcides Guzman MD   [facility] ([facility])    [address]  [facility] [id number] [phone]              Future tests that were ordered for you     Hemoglobin       Last Lab Result: Hemoglobin (g/dL)       Date                     Value                 05/08/2017               9.1 (L)          ----------                  Pending Results     Date and Time Order Name Status Description    5/7/2017 1108 Surgical pathology exam In process             Statement of Approval     Ordered          05/08/17 1046  I have reviewed and agree with all the recommendations and orders detailed in this document.  EFFECTIVE NOW     Approved and electronically signed by:  Kapil Wells MD             Admission Information     Date & Time Provider Department Dept. Phone    5/6/2017 Elizabeth Casas MD [facility] [phone]      Your Vitals Were     Blood Pressure Pulse Temperature Respirations Height Weight    135/62 86 97.9  F (36.6  C) (Oral) 16 1.626 m (5' 4\") 59 kg (130 lb)    Pulse Oximetry BMI (Body Mass Index)                94% 22.31 kg/m2          MyChart Information     [facility] lets you send messages to your doctor, view your test results, renew your prescriptions, " "schedule appointments and more. To sign up, go to [email or web address] . Click on \"Log in\" on the left side of the screen, which will take you to the Welcome page. Then click on \"Sign up Now\" on the right side of the page.     You will be asked to enter the access code listed below, as well as some personal information. Please follow the directions to create your username and password.     Your access code is: [email or web address]  Expires: 2017  6:01 PM     Your access code will  in 90 days. If you need help or a new code, please call your [place] clinic or [phone].        Care EveryWhere ID     This is your Care EveryWhere ID. This could be used by other organizations to access your [place] medical records  [id number]           Review of your medicines      START taking        Dose / Directions    pantoprazole 40 MG EC tablet   Commonly known as:  PROTONIX   Used for:  Gastrointestinal hemorrhage associated with gastric ulcer        Dose:  40 mg   Take 1 tablet (40 mg) by mouth 2 times daily   Quantity:  60 tablet   Refills:  3         CONTINUE these medicines which have NOT CHANGED        Dose / Directions    alendronate 35 MG tablet   Commonly known as:  FOSAMAX   Used for:  Osteopenia        Dose:  35 mg   Take 1 tablet (35 mg) by mouth once a week   Quantity:  13 tablet   Refills:  3       amLODIPine 10 MG tablet   Commonly known as:  NORVASC   Used for:  Essential hypertension with goal blood pressure less than 130/80        Dose:  10 mg   Take 1 tablet (10 mg) by mouth daily   Quantity:  90 tablet   Refills:  3       calcium + D 600-200 MG-UNIT Tabs   Generic drug:  calcium carbonate-vitamin D        Dose:  2 tablet   Take 2 tablets by mouth 2 times daily.   Refills:  0       ezetimibe 10 MG tablet   Commonly known as:  ZETIA   Used for:  Hyperlipidemia LDL goal <100        Dose:  10 mg   Take 1 tablet (10 mg) by mouth At Bedtime   Quantity:  90 tablet   Refills:  3       fenofibrate 160 MG " tablet   Used for:  Hyperlipidemia LDL goal <100        Dose:  160 mg   Take 1 tablet (160 mg) by mouth daily   Quantity:  90 tablet   Refills:  3       metoprolol 100 MG tablet   Commonly known as:  LOPRESSOR   Used for:  Essential hypertension with goal blood pressure less than 130/80        Dose:  100 mg   Take 1 tablet (100 mg) by mouth 2 times daily   Quantity:  180 tablet   Refills:  3       simvastatin 40 MG tablet   Commonly known as:  ZOCOR   Used for:  Hyperlipidemia LDL goal <100        Dose:  40 mg   Take 1 tablet (40 mg) by mouth At Bedtime   Quantity:  90 tablet   Refills:  3         STOP taking     aspirin 81 MG tablet           lisinopril 40 MG tablet   Commonly known as:  PRINIVIL/ZESTRIL           omeprazole 40 MG capsule   Commonly known as:  priLOSEC                Where to get your medicines      These medications were sent to [facility] - [address]  [address], [place] [address]     Phone:  [phone]     pantoprazole 40 MG EC tablet                Protect others around you: Learn how to safely use, store and throw away your medicines at www.disposemymeds.org.             Medication List: This is a list of all your medications and when to take them. Check marks below indicate your daily home schedule. Keep this list as a reference.      Medications           Morning Afternoon Evening Bedtime As Needed    alendronate 35 MG tablet   Commonly known as:  FOSAMAX   Take 1 tablet (35 mg) by mouth once a week   Next Dose Due:  Resume                                amLODIPine 10 MG tablet   Commonly known as:  NORVASC   Take 1 tablet (10 mg) by mouth daily   Next Dose Due:  Resume                                calcium + D 600-200 MG-UNIT Tabs   Take 2 tablets by mouth 2 times daily.   Generic drug:  calcium carbonate-vitamin D   Next Dose Due:  Resume                                ezetimibe 10 MG tablet   Commonly known as:  ZETIA   Take 1 tablet  (10 mg) by mouth At Bedtime   Next Dose Due:  resume                                fenofibrate 160 MG tablet   Take 1 tablet (160 mg) by mouth daily   Next Dose Due:  Resume                                metoprolol 100 MG tablet   Commonly known as:  LOPRESSOR   Take 1 tablet (100 mg) by mouth 2 times daily   Last time this was given:  50 mg on 5/8/2017  8:15 AM   Next Dose Due:  5/8/17 PM                                   pantoprazole 40 MG EC tablet   Commonly known as:  PROTONIX   Take 1 tablet (40 mg) by mouth 2 times daily   Next Dose Due:  5/8/17 PM                                   simvastatin 40 MG tablet   Commonly known as:  ZOCOR   Take 1 tablet (40 mg) by mouth At Bedtime   Last time this was given:  40 mg on 5/7/2017  9:36 PM   Next Dose Due:  5/8/17 Bedtime

## 2017-05-06 NOTE — IP AVS SNAPSHOT
[address] [facility]    [address]    [place] [address]    Phone:  [phone]                                       After Visit Summary   5/6/2017    Lori Fall    MRN: [id number]           After Visit Summary Signature Page     I have received my discharge instructions, and my questions have been answered. I have discussed any challenges I see with this plan with the nurse or doctor.    ..........................................................................................................................................  Patient/Patient Representative Signature      ..........................................................................................................................................  Patient Representative Print Name and Relationship to Patient    ..................................................               ................................................  Date                                            Time    ..........................................................................................................................................  Reviewed by Signature/Title    ...................................................              ..............................................  Date                                                            Time

## 2017-05-06 NOTE — H&P
[facility] History and Physical    Lori Fall MRN# [id number]   Age: 87 year old YOB: 1929     Date of Admission:  5/6/2017    Home clinic: [facility] [address] [place] [address]    Primary care provider: Alcides Guzman          Chief Complaint:   Lightheadedness, near syncopy, black stool    History is obtained from the patient and patient's daughter          History of Present Illness:   This patient is a 87 year old  female with a significant past medical history of hypertension who presents with Lightheadedness, near syncopy and black tarry stool this AM. She had near syncopal episode at [facility] last week--did not fall. Fell yesterday in kitchen. This mornning was almost going to fall--held on to the car. No cp/sob. Had upper abd pain radiating to back 2 days agp--saw PMD 5/3 who got CT abd and started on prilosec. CT-as below.      Ct 5/3-CT IMPRESSION: (05/03/2017)  1. No aortic dissection or acute aortic abnormality. No pulmonary  embolism or acute chest disease.  2. Some edema of the distal stomach and proximal duodenum could  represent gastritis or duodenitis. Cannot exclude underlying gastric  or duodenal peptic ulcer disease. No free air or free fluid.  3. Cholelithiasis. Contracted appearance of the gallbladder. Some  enhancement is nonspecific within the mid gallbladder that is  decompressed.  4. A few nonspecific pulmonary nodules. See below for follow up  recommendation.  5. Homogeneously enhancing rounded nodule at the pancreatic tail tip  may just be a splenule. This has some mild heterogeneity medially with  a potential small cyst. Recommend 6-12 month follow-up CT abdomen for  surveillance.  6. Moderate hiatal hernia.          Past Medical History:     Patient Active Problem List    Diagnosis Date Noted     HYPERLIPIDEMIA LDL GOAL <100 10/31/2010     Disorder of bone and cartilage 12/10/2007     Problem list name updated by  automated process. Provider to review                 Past Surgical History:      Past Surgical History:   Procedure Laterality Date     C NONSPECIFIC PROCEDURE      splenectomy -- abstracted 02     C NONSPECIFIC PROCEDURE      [facility]  BSO appendectomy     C NONSPECIFIC PROCEDURE      benign breast bopsy     C NONSPECIFIC PROCEDURE  2010    Left total hip arthroplasty, Bijal uncemented components     TONSILLECTOMY & ADENOIDECTOMY               Social History:     Social History     Social History     Marital status:      Spouse name: Nhan     Number of children: 1     Years of education: 12     Occupational History     retired      retired       Retired     Social History Main Topics     Smoking status: Never Smoker     Smokeless tobacco: Never Used     Alcohol use 0.0 oz/week     0 Standard drinks or equivalent per week      Comment: occasional wine     Drug use: No     Sexual activity: Yes     Partners: Male     Other Topics Concern     Not on file     Social History Narrative             Family History:     Family History   Problem Relation Age of Onset     HEART DISEASE Mother      d:age 66     HEART DISEASE Father      d:age 62     Family History Negative Brother      d:age 35  in war     CANCER Brother      d:age 53 cancer esophagus, alcoholic     HEART DISEASE Brother      b:1933 bypass surgery             Allergies:     Allergies   Allergen Reactions     Aminoglycosides      neosporin onintment - rash     Hctz Hives     Penicillins      Sulfa Drugs              Medications:     Prior to Admission medications    Medication Sig Last Dose Taking? Auth Provider   omeprazole (PRILOSEC) 40 MG capsule Take 1 capsule (40 mg) by mouth 2 times daily for 10 days Take 30-60 minutes before a meal.   Alcides Guzman MD   amLODIPine (NORVASC) 10 MG tablet Take 1 tablet (10 mg) by mouth daily   Alcides Guzman MD   alendronate (FOSAMAX) 35 MG tablet Take  "1 tablet (35 mg) by mouth once a week   Alcides Guzman MD   fenofibrate 160 MG tablet Take 1 tablet (160 mg) by mouth daily   Alcides Guzman MD   lisinopril (PRINIVIL/ZESTRIL) 40 MG tablet Take 1 tablet (40 mg) by mouth daily   Alcides Guzman MD   metoprolol (LOPRESSOR) 100 MG tablet Take 1 tablet (100 mg) by mouth 2 times daily   Alcides Guzman MD   simvastatin (ZOCOR) 40 MG tablet Take 1 tablet (40 mg) by mouth At Bedtime   Alcides Guzman MD   ezetimibe (ZETIA) 10 MG tablet Take 1 tablet (10 mg) by mouth At Bedtime   Alcides Guzman MD   aspirin 81 MG tablet Take 1 tablet by mouth daily.   Alcides Guzman MD   Calcium Carbonate-Vitamin D (CALCIUM + D) 600-200 MG-UNIT per tablet Take 2 tablets by mouth 2 times daily.   Reported, Patient            Review of Systems:   The Review of Systems is negative in ALL other than noted in the HPI          Physical Exam:   Blood pressure 120/58, pulse 70, temperature 98.3  F (36.8  C), temperature source Oral, resp. rate 10, height 1.626 m (5' 4\"), weight 59 kg (130 lb), SpO2 98 %, not currently breastfeeding.  GENERAL APPEARANCE: healthy, alert and no distress  EYES: conjunctiva clear, eyes grossly normal  HENT: external ears and nose normal   NECK: supple, no masses or adenopathy  RESP: lungs clear to auscultation - no rales, rhonchi or wheezes  CV: regular rate and rhythm, normal S1 S2, no S3 or S4 and no murmur, click or rub   ABDOMEN: soft, nontender, no HSM or masses and bowel sounds normal  MS: no clubbing, cyanosis; no edema  SKIN: clear without significant rashes or lesions  NEURO: Normal strength and tone, sensory exam grossly normal, mentation intact and speech normal         Data:     Lab Results   Component Value Date    WBC 7.1 05/06/2017    HGB 9.7 (L) 05/06/2017    HCT 29.4 (L) 05/06/2017    MCV 91 05/06/2017     05/06/2017     Lab Results   Component Value Date     " 05/06/2017    CO2 28 05/06/2017     Lab Results   Component Value Date    BUN 34 (H) 05/06/2017     No components found for: SEDRATE  No components found for: DDIMER  No results found for: BNP  Lab Results   Component Value Date    TSH 1.26 12/07/2016     No results found for: TROPONIN  UA RESULTS:  Recent Labs   Lab Test  05/03/17   1305  10/25/12   1449   COLOR  Light Yellow  Yellow   APPEARANCE  Slightly Cloudy  Clear   URINEGLC  Negative  Negative   URINEBILI  Negative  Negative   URINEKETONE  Negative  Negative   SG  1.010  >1.030   UBLD  Negative  Trace*   URINEPH  6.5  5.5   PROTEIN  Negative  Negative   UROBILINOGEN   --   0.2   NITRITE  Negative  Negative   LEUKEST  Large*  Small*   RBCU  1  O - 2   WBCU  31*  5-10*     Liver Function Studies -   Recent Labs   Lab Test  05/03/17   1309   PROTTOTAL  6.7*   ALBUMIN  3.5   BILITOTAL  0.5   ALKPHOS  45   AST  24   ALT  21       EKG results:  Personally reviewed.  SR    RADIOLOGY:  Personally reviewed.       CT IMPRESSION: (05/03/2017)  1. No aortic dissection or acute aortic abnormality. No pulmonary  embolism or acute chest disease.  2. Some edema of the distal stomach and proximal duodenum could  represent gastritis or duodenitis. Cannot exclude underlying gastric  or duodenal peptic ulcer disease. No free air or free fluid.  3. Cholelithiasis. Contracted appearance of the gallbladder. Some  enhancement is nonspecific within the mid gallbladder that is  decompressed.  4. A few nonspecific pulmonary nodules. See below for follow up  recommendation.  5. Homogeneously enhancing rounded nodule at the pancreatic tail tip  may just be a splenule. This has some mild heterogeneity medially with  a potential small cyst. Recommend 6-12 month follow-up CT abdomen for  surveillance.  6. Moderate hiatal hernia.     US IMPRESSION: (5/3/2017)  1. Cholelithiasis without evidence of cholecystitis.  2. Postoperative changes of splenectomy.     A/P  ACUTE UPPER GI BLEED  Likely  due to PUD. CT as above. Pt was just started on prilosec 2 days ago. Now presents with near syncopy, black tarry stool this AM. Hg down to 9 from 12. Hemodynamically stable  -will keep NPO--GI to see for EGD. Continue protonix gtt started in ED    HTN  Stable BP. On high dose bblocker, lisinopril, amlodipine.  -will resume only bblocker at this time--1/2 dose. Watch and resume meds when w/u complete.    HLD  Continue meds    OSTEOPOROSI  Resume meds on discharge    DISPO  Will be in hospital 1-2 days.      Elizabeth Casas MD  [phone]

## 2017-05-06 NOTE — PHARMACY-ADMISSION MEDICATION HISTORY
Admission medication history interview status for the 5/6/2017  admission is complete. See EPIC admission navigator for prior to admission medications     Medication history source reliability:Good    Actions taken by pharmacist (provider contacted, etc): reviewed med list and last doses with patient.      Additional medication history information not noted on PTA med list : none    Medication reconciliation/reorder completed by provider prior to medication history? Yes    Time spent in this activity: 30 min    Prior to Admission medications    Medication Sig Last Dose Taking? Auth Provider   omeprazole (PRILOSEC) 40 MG capsule Take 1 capsule (40 mg) by mouth 2 times daily for 10 days Take 30-60 minutes before a meal. 5/6/2017 at am Yes Alcides Guzman MD   amLODIPine (NORVASC) 10 MG tablet Take 1 tablet (10 mg) by mouth daily 5/6/2017 at am Yes Alcides Guzman MD   alendronate (FOSAMAX) 35 MG tablet Take 1 tablet (35 mg) by mouth once a week 5/6/2017 at am Yes Alcides Guzman MD   fenofibrate 160 MG tablet Take 1 tablet (160 mg) by mouth daily 5/6/2017 at am Yes Alcides Guzman MD   lisinopril (PRINIVIL/ZESTRIL) 40 MG tablet Take 1 tablet (40 mg) by mouth daily 5/6/2017 at am Yes Alcides Guzman MD   metoprolol (LOPRESSOR) 100 MG tablet Take 1 tablet (100 mg) by mouth 2 times daily 5/6/2017 at AM Yes Alcides Guzman MD   simvastatin (ZOCOR) 40 MG tablet Take 1 tablet (40 mg) by mouth At Bedtime 5/5/2017 at hs Yes Alcides Guzman MD   ezetimibe (ZETIA) 10 MG tablet Take 1 tablet (10 mg) by mouth At Bedtime 5/5/2017 at hs Yes Alcides Guzman MD   aspirin 81 MG tablet Take 1 tablet by mouth daily. 5/6/2017 at am Yes Alcides Guzman MD   Calcium Carbonate-Vitamin D (CALCIUM + D) 600-200 MG-UNIT per tablet Take 2 tablets by mouth 2 times daily. 5/6/2017 at am Yes Reported, Patient

## 2017-05-06 NOTE — LETTER
Transition Communication Hand-off for Care Transitions to Next Level of Care Provider    Name: Lori Fall  MRN #: [id number]  Primary Care Provider: Alcides Guzman  Primary Care MD Name: Dr Guzman  Primary Clinic: [facility] [address]  [place] [address]  Primary Care Clinic Name:  Vascular clinic  Reason for Hospitalization:  GI bleed [K92.2]  Admit Date/Time: 5/6/2017 12:40 PM  Discharge Date: 5/8/2017  Payor Source: Payor: MEDICARE / Plan: MEDICARE / Product Type: Medicare /     Readmission Assessment Measure (IRENE) Risk Score/category: average    Plan of Care Goals/Milestone Events:   Patient Concerns: multiple changes in her and her 's meds      Reason for Communication Hand-off Referral: Difficulty understanding plan of care    Discharge Plan:  Discharge Plan:       Most Recent Value    Concerns To Be Addressed all concerns addressed in this encounter           Concern for non-adherence with plan of care:   yes  Discharge Needs Assessment:  Needs       Most Recent Value    # of Referrals Placed by [facility] Care Coordination, Scheduled Follow-up appointments          Follow-up specialty is recommended: Yes    Follow-up plan:  Future Appointments  Date Time Provider Department Center   5/10/2017 10:30 AM Alcides Guzman MD [facility]       Any outstanding tests or procedures:  hgb 5/10            Key Recommendations:     Pt's  is getting rehab for a stroke at [facility] and is supposed to be discharged home this week.  Pt feels completely back to her baseline but feels a little overwhelmed with changes in her medications and managing her 's stroke care when he is dc'd.  She is alert and oriented and able to state her med changes but she is just a little anxious about all the changes in the couple's life lately.  She is getting everything in writing and bedside RN will go over everything with her niece as well.  She is not home bound and does  not qualify for home RN.    Contacted [facility] and informed them that this is a concern of hers.  They will make sure to explain everything carefully and assess for any home needs.        Christina Peace    AVS/Discharge Summary is the source of truth; this is a helpful guide for improved communication of patient story

## 2017-05-06 NOTE — ED PROVIDER NOTES
"  History     Chief Complaint:  Dizziness (pt has had 3 recent near syncopal episodes)      HPI   Lori Fall is a 87 year old female who presents with episodes of presyncope. She says she feels like she is \"going to pass out\". She reports the first episode happened at [employer] one week ago and was able to sit and a chair and felt better. Yesterday morning she reports falling after one of these episodes at home. She did not hit her head and did not injure herself. She is on Asprin 81mg, no other blood thinners. This morning after the grocery store she reports a similar episode and her daughter was able to sit her down and brought her in. She denies chest pain, palpitations, or shortness of breath during these episodes. Her primary, She saw Dr. Guzman on 5/3 for epigastric abdominal pain. Reduced PO intake due to pain. He obtained a CT and US which showed nothing remarkable, but concern for passing a gallstones or possible PUD causing her symptoms. She was prescribed Omeprazole but has only taken 1 pill. This morning she reports black tarry stools. Has not had a BM for several days which is not normal for her. She otherwise feels well. She states She has no current complaints. Denies fever, abdominal pain, chest pain or shortness of breath.      CT IMPRESSION: (05/03/2017)  1. No aortic dissection or acute aortic abnormality. No pulmonary  embolism or acute chest disease.  2. Some edema of the distal stomach and proximal duodenum could  represent gastritis or duodenitis. Cannot exclude underlying gastric  or duodenal peptic ulcer disease. No free air or free fluid.  3. Cholelithiasis. Contracted appearance of the gallbladder. Some  enhancement is nonspecific within the mid gallbladder that is  decompressed.  4. A few nonspecific pulmonary nodules. See below for follow up  recommendation.  5. Homogeneously enhancing rounded nodule at the pancreatic tail tip  may just be a splenule. This has some mild heterogeneity " medially with  a potential small cyst. Recommend 6-12 month follow-up CT abdomen for  surveillance.  6. Moderate hiatal hernia.    US IMPRESSION: (5/3/2017)  1. Cholelithiasis without evidence of cholecystitis.  2. Postoperative changes of splenectomy.    Allergies:    Allergies   Allergen Reactions     Aminoglycosides      neosporin onintment - rash     Hctz Hives     Penicillins      Sulfa Drugs         Medications:      omeprazole (PRILOSEC) 40 MG capsule   amLODIPine (NORVASC) 10 MG tablet   alendronate (FOSAMAX) 35 MG tablet   fenofibrate 160 MG tablet   lisinopril (PRINIVIL/ZESTRIL) 40 MG tablet   metoprolol (LOPRESSOR) 100 MG tablet   simvastatin (ZOCOR) 40 MG tablet   ezetimibe (ZETIA) 10 MG tablet   aspirin 81 MG tablet   Calcium Carbonate-Vitamin D (CALCIUM + D) 600-200 MG-UNIT per tablet       Problem List:    Patient Active Problem List    Diagnosis Date Noted     HYPERLIPIDEMIA LDL GOAL <100 10/31/2010     Priority: Medium     Disorder of bone and cartilage 12/10/2007     Priority: Medium     Problem list name updated by automated process. Provider to review          Past Medical History:    Past Medical History:   Diagnosis Date     Essential hypertension, benign      Hyperlipidemia LDL goal < 100      Impaired fasting glucose      NONSPECIFIC MEDICAL HISTORY      Osteopenia      Primary thrombocytopenia 1995     PUD (peptic ulcer disease) 1960s     Pure hypercholesterolemia        Past Surgical History:    Past Surgical History:   Procedure Laterality Date     C NONSPECIFIC PROCEDURE  1995    splenectomy -- abstracted 7/5/02     C NONSPECIFIC PROCEDURE  1977    [facility]  BSO appendectomy     C NONSPECIFIC PROCEDURE  1994    benign breast bopsy     C NONSPECIFIC PROCEDURE  6/28/2010    Left total hip arthroplasty, Bijal uncemented components     TONSILLECTOMY & ADENOIDECTOMY  1956       Family History:    Family History   Problem Relation Age of Onset     HEART DISEASE Mother      d:age 66     HEART  "DISEASE Father      d:age 62     Family History Negative Brother      d:age 35  in war     CANCER Brother      d:age 53 cancer esophagus, alcoholic     HEART DISEASE Brother      b:1933 bypass surgery       Social History:  Marital Status:   [2]  Social History   Substance Use Topics     Smoking status: Never Smoker     Smokeless tobacco: Never Used     Alcohol use 0.0 oz/week     0 Standard drinks or equivalent per week      Comment: occasional wine        Review of Systems   Constitutional: Negative for chills and fever.   Respiratory: Negative for cough, chest tightness and shortness of breath.    Cardiovascular: Negative for chest pain, palpitations and leg swelling.   Gastrointestinal: Positive for constipation and nausea. Negative for abdominal pain and vomiting.        Black, tarry stools.    Genitourinary: Negative for difficulty urinating, dysuria and flank pain.   Skin: Negative for rash.   Neurological: Positive for weakness and light-headedness. Negative for headaches.     Physical Exam   First Vitals:  BP: 112/55  Pulse: 70  Heart Rate: 70  Temp: 98.3  F (36.8  C)  Resp: 14  Height: 162.6 cm (5' 4\")  Weight: 57.2 kg (126 lb)  SpO2: 98 %      Physical Exam  General: Resting comfortably.  Alert and oriented. Very pleasant.   Head:  The scalp, face, and head appear normal   Eyes:  The pupils are equal, round, and reactive to light     Extraocular muscles are intact    Conjunctivae and sclerae are normal    CV:  Regular rate and rhythm     Normal S1/S2    No pathological murmur detected  Resp:  Lungs are clear to auscultation    Non-labored    No rales or wheezing  GI:  Abdomen is soft, non-distended    No rebound tenderness     Normal bowel sounds  MS:  Normal muscular tone.   Skin:  No rash or acute skin lesions noted  Neuro: Speech is normal and fluent. Crainal nerves grossly intact. Able to move all 4 extremities equally. Distal sensation intact.       Emergency Department Course          EKG " Interpretation:      Interpreted by Ondina Nash  Time reviewed:  Symptoms at time of EKG: None   Rhythm: Normal sinus   Rate: Normal  Axis: Normal  Ectopy: None  Conduction: Normal  ST Segments/ T Waves: No ST-T wave changes and No acute ischemic changes  Q Waves: None  Comparison to prior: Unchanged from 5/3/17    Clinical Impression: normal EKG    ECG (12:55:07):  Indication: Dizziness.   Rate 67 bpm. SC interval 144. QRS duration 86. QT/QTc 408/431. P-R-T axes -2.   Interpretation: Normal sinus rhythm.  Agree with computer interpretation.   No significant change compared to EKG dated 5/3/17.   Interpreted at 1300 by Dr. Juarez.      Laboratory:  Labs Ordered and Resulted from Time of ED Arrival Up to the Time of Departure from the ED   CBC WITH PLATELETS DIFFERENTIAL - Abnormal; Notable for the following:        Result Value    RBC Count 3.24 (*)     Hemoglobin 9.7 (*)     Hematocrit 29.4 (*)     RDW 15.3 (*)     All other components within normal limits   BASIC METABOLIC PANEL - Abnormal; Notable for the following:     Glucose 117 (*)     Urea Nitrogen 34 (*)     Creatinine 1.12 (*)     GFR Estimate 46 (*)     GFR Estimate If Black 56 (*)     All other components within normal limits   OCCULT BLOOD STOOL - Abnormal; Notable for the following:     Occult Blood Positive (*)     All other components within normal limits   MAGNESIUM   LACTIC ACID WHOLE BLOOD   TROPONIN I   ROUTINE UA WITH MICROSCOPIC   VITAL SIGNS   PULSE OXIMETRY NURSING   CARDIAC CONTINUOUS MONITORING   PERIPHERAL IV CATHETER   ABO/RH TYPE AND SCREEN     Interventions:  Medications   pantoprazole (PROTONIX) 40 mg IV push injection (40 mg Intravenous Given 5/6/17 1404)   0.9% sodium chloride BOLUS (1,000 mLs Intravenous New Bag 5/6/17 1402)     ED Course:  I reviewed the patient's medical record.   The patient was seen and examined by myself. I discussed the course of care with the patient including laboratory and diagnostic studies.    She  understands and is agreeable to the plan.  Recheck. 2:10pm. Rechecked by Dr. Juarez shortly after   I discussed with the patient the results of the above studies and procedures.   She will be admitted to the medical floor for further workup and treatment.     Impression & Plan    Medical Decision Making:  Lori Fall is a 87 year old female who presents with episodes of presyncope. History suspicious for PUD, and now syncopal and having black stools. Hgb dropped from 12.2 to 9.7 in three days. Occult + stool. Creatinine and and BUN increased likely 2/2 decreased oral intake over the last week. Other labs unremarkable. Ekg showed no abnormalities. She will be admitted to the medical floor for further workup and treatment.     Diagnosis:    ICD-10-CM    1. GI bleed K92.2        Disposition:  Admitted to the medical floor    Discharge Medications:  New Prescriptions    No medications on file         Ondina Nash  5/6/2017    EMERGENCY DEPARTMENT       Ondina Nash PA-C  05/06/17 1452       Ondina Nash PA-C  05/06/17 1452

## 2017-05-06 NOTE — ED NOTES
"[facility]  ED Nurse Handoff Report    ED Chief complaint: Dizziness (pt has had 3 recent near syncopal episodes)      ED Diagnosis:   Final diagnoses:   None       Code Status: Full Code    Allergies:   Allergies   Allergen Reactions     Aminoglycosides      neosporin onintment - rash     Hctz Hives     Penicillins      Sulfa Drugs        Activity level - Baseline/Home:  Independent    Activity Level - Current:   Stand with Assist     Needed?: No    Isolation: No  Infection: Not Applicable    Bariatric?: No    Vital Signs:   Vitals:    05/06/17 1237 05/06/17 1243 05/06/17 1246 05/06/17 1300   BP: 112/55 120/58 120/58    Pulse: 70  70    Resp: 14  16 10   Temp:   98.3  F (36.8  C)    TempSrc:   Oral    SpO2: 98%  98% 98%   Weight: 57.2 kg (126 lb)  59 kg (130 lb)    Height:   1.626 m (5' 4\")        Cardiac Rhythm: ,        Pain level: 0-10 Pain Scale: 3    Is this patient confused?: No    Patient Report: Initial Complaint: near syncope  Focused Assessment:pt had a near syncopal episode a week ago yesterday fell. Today near syncope again. Pt saw.dr ornelas for epigastric pain on 5/3. unremarkable ct at that time. Patient  States had black stool this am. Pt alert and oriented. Denies all pain. hgb has dropped since seen in clinic on 5/3.   Tests Performed: blood  Abnormal Results:see labs  Treatments provided:  Ns .protonix    Family Comments: niece is here. pts  is at [place] rehabbing post cva OBS brochure/video discussed/provided to patient: N/A    ED Medications:   Medications   pantoprazole (PROTONIX) 40 mg IV push injection (40 mg Intravenous Given 5/6/17 1404)   0.9% sodium chloride BOLUS (1,000 mLs Intravenous New Bag 5/6/17 1402)       Drips infusing?:  No      ED NURSE PHONE NUMBER: [phone]         "

## 2017-05-07 LAB
HGB BLD-MCNC: 9.3 G/DL (ref 11.7–15.7)
UPPER GI ENDOSCOPY: NORMAL

## 2017-05-07 PROCEDURE — 85018 HEMOGLOBIN: CPT | Performed by: INTERNAL MEDICINE

## 2017-05-07 PROCEDURE — 25000128 H RX IP 250 OP 636: Performed by: EMERGENCY MEDICINE

## 2017-05-07 PROCEDURE — 88305 TISSUE EXAM BY PATHOLOGIST: CPT | Mod: 26 | Performed by: INTERNAL MEDICINE

## 2017-05-07 PROCEDURE — 0DB68ZX EXCISION OF STOMACH, VIA NATURAL OR ARTIFICIAL OPENING ENDOSCOPIC, DIAGNOSTIC: ICD-10-PCS | Performed by: INTERNAL MEDICINE

## 2017-05-07 PROCEDURE — 12000000 ZZH R&B MED SURG/OB

## 2017-05-07 PROCEDURE — 25000128 H RX IP 250 OP 636: Performed by: INTERNAL MEDICINE

## 2017-05-07 PROCEDURE — 36415 COLL VENOUS BLD VENIPUNCTURE: CPT | Performed by: INTERNAL MEDICINE

## 2017-05-07 PROCEDURE — A9270 NON-COVERED ITEM OR SERVICE: HCPCS | Mod: GY | Performed by: INTERNAL MEDICINE

## 2017-05-07 PROCEDURE — 25000125 ZZHC RX 250: Performed by: INTERNAL MEDICINE

## 2017-05-07 PROCEDURE — 88342 IMHCHEM/IMCYTCHM 1ST ANTB: CPT | Mod: 26 | Performed by: INTERNAL MEDICINE

## 2017-05-07 PROCEDURE — 25000132 ZZH RX MED GY IP 250 OP 250 PS 637: Mod: GY | Performed by: INTERNAL MEDICINE

## 2017-05-07 PROCEDURE — G0500 MOD SEDAT ENDO SERVICE >5YRS: HCPCS | Performed by: INTERNAL MEDICINE

## 2017-05-07 PROCEDURE — S0164 INJECTION PANTROPRAZOLE: HCPCS | Performed by: EMERGENCY MEDICINE

## 2017-05-07 PROCEDURE — 25000125 ZZHC RX 250: Performed by: EMERGENCY MEDICINE

## 2017-05-07 PROCEDURE — 99233 SBSQ HOSP IP/OBS HIGH 50: CPT | Performed by: INTERNAL MEDICINE

## 2017-05-07 PROCEDURE — 88342 IMHCHEM/IMCYTCHM 1ST ANTB: CPT | Performed by: INTERNAL MEDICINE

## 2017-05-07 PROCEDURE — 88305 TISSUE EXAM BY PATHOLOGIST: CPT | Performed by: INTERNAL MEDICINE

## 2017-05-07 PROCEDURE — 43239 EGD BIOPSY SINGLE/MULTIPLE: CPT | Performed by: INTERNAL MEDICINE

## 2017-05-07 RX ORDER — FENTANYL CITRATE 50 UG/ML
25 INJECTION, SOLUTION INTRAMUSCULAR; INTRAVENOUS EVERY 5 MIN PRN
Status: ACTIVE | OUTPATIENT
Start: 2017-05-07 | End: 2017-05-08

## 2017-05-07 RX ORDER — NALOXONE HYDROCHLORIDE 0.4 MG/ML
.1-.4 INJECTION, SOLUTION INTRAMUSCULAR; INTRAVENOUS; SUBCUTANEOUS
Status: DISCONTINUED | OUTPATIENT
Start: 2017-05-07 | End: 2017-05-08 | Stop reason: HOSPADM

## 2017-05-07 RX ORDER — FENTANYL CITRATE 50 UG/ML
25-50 INJECTION, SOLUTION INTRAMUSCULAR; INTRAVENOUS
Status: ACTIVE | OUTPATIENT
Start: 2017-05-07 | End: 2017-05-08

## 2017-05-07 RX ORDER — FENTANYL CITRATE 50 UG/ML
INJECTION, SOLUTION INTRAMUSCULAR; INTRAVENOUS PRN
Status: DISCONTINUED | OUTPATIENT
Start: 2017-05-07 | End: 2017-05-07 | Stop reason: HOSPADM

## 2017-05-07 RX ORDER — LIDOCAINE 40 MG/G
CREAM TOPICAL
Status: DISCONTINUED | OUTPATIENT
Start: 2017-05-07 | End: 2017-05-07 | Stop reason: HOSPADM

## 2017-05-07 RX ORDER — FLUMAZENIL 0.1 MG/ML
0.2 INJECTION, SOLUTION INTRAVENOUS
Status: DISCONTINUED | OUTPATIENT
Start: 2017-05-07 | End: 2017-05-08 | Stop reason: HOSPADM

## 2017-05-07 RX ADMIN — SODIUM CHLORIDE 8 MG/HR: 9 INJECTION, SOLUTION INTRAVENOUS at 08:09

## 2017-05-07 RX ADMIN — SIMVASTATIN 40 MG: 40 TABLET, FILM COATED ORAL at 21:36

## 2017-05-07 RX ADMIN — METOPROLOL TARTRATE 50 MG: 50 TABLET, FILM COATED ORAL at 21:36

## 2017-05-07 RX ADMIN — SODIUM CHLORIDE: 9 INJECTION, SOLUTION INTRAVENOUS at 23:24

## 2017-05-07 RX ADMIN — METOPROLOL TARTRATE 50 MG: 50 TABLET, FILM COATED ORAL at 11:45

## 2017-05-07 RX ADMIN — PANTOPRAZOLE SODIUM 40 MG: 40 INJECTION, POWDER, FOR SOLUTION INTRAVENOUS at 21:35

## 2017-05-07 ASSESSMENT — ACTIVITIES OF DAILY LIVING (ADL)
BATHING: 0-->INDEPENDENT
RETIRED_EATING: 0-->INDEPENDENT
DRESS: 0-->INDEPENDENT
SWALLOWING: 0-->SWALLOWS FOODS/LIQUIDS WITHOUT DIFFICULTY
RETIRED_COMMUNICATION: 0-->UNDERSTANDS/COMMUNICATES WITHOUT DIFFICULTY
TRANSFERRING: 0-->INDEPENDENT
WHICH_OF_THE_ABOVE_FUNCTIONAL_RISKS_HAD_A_RECENT_ONSET_OR_CHANGE?: FALL HISTORY
TOILETING: 0-->INDEPENDENT
AMBULATION: 0-->INDEPENDENT
FALL_HISTORY_WITHIN_LAST_SIX_MONTHS: YES
COGNITION: 0 - NO COGNITION ISSUES REPORTED

## 2017-05-07 NOTE — PLAN OF CARE
Problem: Goal Outcome Summary  Goal: Goal Outcome Summary  Outcome: No Change  A/O x4. AVSS. CMS intact. Denies pain. Tolerating regular diet. Vdg. No BM this shift. Up with SBA. IV infusing. Protonix gtt stopped, scheduled IV protonix BID planned to start tonight. Plan to monitor overnight and check labs in AM.

## 2017-05-07 NOTE — PLAN OF CARE
Problem: Individualization  Goal: Patient Preferences  Outcome: Improving  [id number]: no acute changes this shift. A&Ox4, pleasant. Reg diet. Up w/SBA. Calls appropriately. Visitors this shift. No complaints at this time.

## 2017-05-07 NOTE — OR NURSING
EGD done per Dr. Olmedo. Specimens taken and sent to lab. Report called to floor nurse. Patient transported back to nursing unit with transport assistant.

## 2017-05-07 NOTE — PROVIDER NOTIFICATION
Spoke with MARY ANN Delacruz to DC from his standpoint and f/up in 2 months with EGD. Dr. Casas would like to monitor patient overnight and check labs in AM. Pt and family updated.

## 2017-05-07 NOTE — PLAN OF CARE
Problem: Goal Outcome Summary  Goal: Goal Outcome Summary  Outcome: Improving  Pt alert and oriented x4, up with SBA to bathroom. VSS, denies pain, dizziness or headache. IVF running, NPO. Continue to monitor

## 2017-05-07 NOTE — PROGRESS NOTES
[facility] Internal Medicine Progress Note     Date of Service (when I saw the patient): 05/07/2017      REASON FOR ADMISSION / INTERVAL HISTORY:  Lightheadedness, near syncopy, black stool. See details below.    CT IMPRESSION: (05/03/2017)  1. No aortic dissection or acute aortic abnormality. No pulmonary  embolism or acute chest disease.  2. Some edema of the distal stomach and proximal duodenum could  represent gastritis or duodenitis. Cannot exclude underlying gastric  or duodenal peptic ulcer disease. No free air or free fluid.  3. Cholelithiasis. Contracted appearance of the gallbladder. Some  enhancement is nonspecific within the mid gallbladder that is  decompressed.  4. A few nonspecific pulmonary nodules. See below for follow up  recommendation.  5. Homogeneously enhancing rounded nodule at the pancreatic tail tip  may just be a splenule. This has some mild heterogeneity medially with  a potential small cyst. Recommend 6-12 month follow-up CT abdomen for  surveillance.  6. Moderate hiatal hernia.    EGD-Impression:               - Normal esophagus.                             - Medium-sized hiatal hernia.                             - Non-bleeding, benign appearing gastric ulcer with                             no stigmata of bleeding. Biopsied. Low-risk for                             rebleeding.                             - Normal examined duodenum    ASSESSMENT/PLAN:   ACUTE UPPER GI BLEED  Likely due to PUD. CT as above. Pt was just started on prilosec 2 days ago. Now presents with near syncopy, black tarry stool 5/6 AM. Hg down to 9 from 12. Hemodynamically stable. -- protonix gtt started in ED. Pt had another tarry stool this AM. EGD today as above. --?source of bleed--concerned if there is another source for bleeding.   -will monitor, ck hg in AM--if any more drop, or more melena--needs colonoscopy and further eval. Will d/c protonix gtt. Start iv protonix bid.     MIRTHA  Likely due to  "dehydration/ bleeding. Has been on iv fluids.  -ck labs in AM.     HTN  Stable BP. On high dose bblocker, lisinopril, amlodipine. Resumed  only bblocker at --1/2 dose. Watch and resume meds when w/u complete. BP stable     HLD  Continue meds     OSTEOPOROSI  Resume meds on discharge     DISPO  Will be in hospital 1-2 days      MARIA D FOSTER MD   Pg [phone]    DVT Prhylaxis: Low Risk/Ambulatory with no VTE prophylaxis indicated  Code Status: No Order    ROS:  As described in A/P and Exam.  Otherwise ALL are  negative.    PHYSICAL EXAM:  All vitals have been reviewed    Blood pressure 130/55, pulse 86, temperature 98.1  F (36.7  C), temperature source Oral, resp. rate 16, height 1.626 m (5' 4\"), weight 59 kg (130 lb), SpO2 95 %, not currently breastfeeding.         GENERAL APPEARANCE: healthy, alert and no distress  EYES: conjunctiva clear, eyes grossly normal  HENT: external ears and nose normal   NECK: supple, no masses or adenopathy  RESP: lungs clear to auscultation - no rales, rhonchi or wheezes  CV: regular rate and rhythm, normal S1 S2, no S3 or S4 and no murmur, click or rub   ABDOMEN: soft, nontender, no HSM or masses and bowel sounds normal  MS: no clubbing, cyanosis; no edema  SKIN: clear without significant rashes or lesions  NEURO: -non-focal moves all 4 extr    ROUTINE  LABS (Last four results)  CMP  Recent Labs  Lab 05/06/17  1300 05/03/17  1309    139   POTASSIUM 4.0 3.5   CHLORIDE 103 102   CO2 28 34*   ANIONGAP 7 3   * 117*   BUN 34* 32*   CR 1.12* 0.93   GFRESTIMATED 46* 57*   GFRESTBLACK 56* 69   PRAKASH 9.1 11.4*   MAG 1.8  --    PROTTOTAL  --  6.7*   ALBUMIN  --  3.5   BILITOTAL  --  0.5   ALKPHOS  --  45   AST  --  24   ALT  --  21     CBC  Recent Labs  Lab 05/07/17  0850 05/06/17  1300 05/03/17  1309   WBC  --  7.1 11.8*   RBC  --  3.24* 3.97   HGB 9.3* 9.7* 12.2   HCT  --  29.4* 36.4   MCV  --  91 92   MCH  --  29.9 30.7   MCHC  --  33.0 33.5   RDW  --  15.3* 15.2*   PLT  --  224 " 303     INR  Recent Labs  Lab 05/03/17  1309   INR 1.02     Arterial Blood GasNo lab results found in last 7 days.    No results found for this or any previous visit (from the past 24 hour(s)).

## 2017-05-07 NOTE — PROGRESS NOTES
Pt is alert and oriented X 4. Up with one SBA to bathroom, no dizziness, no falls or syncopal. Denies pain. She is in bed resting at this time. Rounded on often and encouraged to call for all help and assistance. Will cont to monitor.

## 2017-05-07 NOTE — PROGRESS NOTES
SPIRITUAL HEALTH SERVICES  Spiritual Assessment Progress Note  [facility]      PRIMARY FOCUS:      Support for coping    ILLNESS CIRCUMSTANCES:    Reviewed documentation. Reflective conversation shared with patient which integrated elements of illness and family narratives.  Patient resting comfortably in bed.  Welcomed my visit.       Context of Serious Illness/Symptom(s) - Patient came into the hospital feeling dizzy and having dark stools.     Resources for Support -   , [place] and nieces and nephews.   .   DISTRESS:      Emotional, Existential/Relational Distress - Patient states that her  was on this unit a week ago and had suffered a stroke.  He is currently at  acute rehab undergoing therapy. She states that he is doing well and should be back to baseline prior to his stroke. The patient was not sure if she should tell him that she is in the hospital but did call him and he was happy she went to the hospital.  She was thinking that she would be home to take care of him so she is wanting to go home.  Listened as she shared her concern about their declining health as they get older.    Spiritual/Cheondoism Distress - None discussed     Social/Cultural/Economic Distress- None noted        SPIRITUAL/Druze COPING:      Taoist/Clarisse - Shinto     Spiritual Practice(s) - Active member of  [facility].  Volunteers in the front office and is very involved with the [employer].  Sad that their current  is leaving. Prayed with patient.    Emotional, Relational, Existential Connections - Neices and nephews and friends at [employer].       GOALS OF CARE:    Goals of Care - Patient is hoping to go home soon.    Meaning/Sense-Making - Clarisse and family give her life meaning.       PLAN:  will follow depending on LOS.       Elsie Hunt  Chaplain Resident  Pager [phone]

## 2017-05-07 NOTE — CONSULTS
[facility] Consultation    Consultation Assessment/Plan:    1.) Anemia / Melena:  -recent decline in hgb (Hgb=13.2 --> 12.2 --> 9.3) associated with an episode of abdominal pain, melena and near-syncope yesterday  -CT scan suggests possible abnl distal stomach and duodenum  -will arrange EGD for this AM to evaluate for PUD vs gastritis vs other causes of UGI bleeding      Guero Olmedo MD  [facility]    ---------------------------------------------------------------------------------------------------------------------------  Patient Name: Lori Fall      YOB: 1929 (Age: 87 year old)   Medical Record #: [id number]       Primary Physician: Alcides Guzman   Referring Provider: Elizabeth Casas MD   Admit Date/Time: 5/6/2017 12:40 PM       I was asked to see this patient by Elizabeth Casas MD for evaluation of melena, anemia, abnl CT scan.    History of Present Illness:  86 y/o woman with history of reported PUD, splenectomy, and colonic AVMs (non-bleeding on colonoscopy 2007) who presents with anemia and near-syncope.  She had been reporting epigastric abdominal pain for about 1 month.  She was undergoing work-up for this including CT scan which demonstrated  some edema of the distal stomach and proximal duodenum, cholelithiasis, and hiatal hernia.  Yesterday she had an episode of near-syncope while getting out of her car, as wall as a single black stool.  She denies red blood in the stool.  She denies use of NSAIDs (other than ASA 81mg).  She was recently started on omeprazole 3 days ago (based on the CT scan findings).  She has not had nausea/vomiting.  No dysphagia.  No unexplained weight loss.    Past Medical History:  Past Medical History:   Diagnosis Date     Essential hypertension, benign     abstracted 7/5/02     Hyperlipidemia LDL goal < 100      Impaired fasting glucose      NONSPECIFIC MEDICAL HISTORY     Norvasc induced edema for which she takes  "lasix     Osteopenia      Primary thrombocytopenia     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     PUD (peptic ulcer disease) 1960s     Pure hypercholesterolemia     abstracted 02     Past Surgical History:   Procedure Laterality Date     C NONSPECIFIC PROCEDURE      splenectomy -- abstracted 02     C NONSPECIFIC PROCEDURE      [facility]  BSO appendectomy     C NONSPECIFIC PROCEDURE      benign breast bopsy     C NONSPECIFIC PROCEDURE  2010    Left total hip arthroplasty, Bijal uncemented components     TONSILLECTOMY & ADENOIDECTOMY  1956       Review of Systems: A comprehensive review of systems was negative except for items noted in HPI/Subjective.    Current Medications:  No current outpatient prescriptions on file.       Allergies/Sensitivities:   Allergies   Allergen Reactions     Aminoglycosides      neosporin onintment - rash     Hctz Hives     Penicillins      Sulfa Drugs           Social History:   Social History     Social History     Marital status:      Spouse name: Nhan     Number of children: 1     Years of education: 12     Occupational History     retired      retired       Retired     Social History Main Topics     Smoking status: Never Smoker     Smokeless tobacco: Never Used     Alcohol use 0.0 oz/week     0 Standard drinks or equivalent per week      Comment: occasional wine     Drug use: No     Sexual activity: Yes     Partners: Male     Other Topics Concern     Not on file     Social History Narrative     Family History:   Family History   Problem Relation Age of Onset     HEART DISEASE Mother      d:age 66     HEART DISEASE Father      d:age 62     Family History Negative Brother      d:age 35  in war     CANCER Brother      d:age 53 cancer esophagus, alcoholic     HEART DISEASE Brother      b:1933 bypass surgery       Physical Exam:  /55 (BP Location: Right arm)  Pulse 86  Temp 98.1  F (36.7  C) (Oral)  Resp 16  Ht 1.626 m (5' 4\")  Wt 59 kg (130 " lb)  SpO2 95%  BMI 22.31 kg/m2   General Appearance: Comfortable, laying in bed  Eyes: Normal  HEENT: Normal  Neck: Normal, no palpable lymph nodes  Respiratory: Normal  Cardiovascular: Normal  Gastrointestinal: Normal bowel sounds  Musculoskeletal: Normal  Lymphatic: Normal  Skin: Normal  Neurologic: Normal     Labs/Imaging:  Recent Labs   Lab Test  05/06/17   1300  05/03/17   1309  03/08/17   0752  12/07/16   0818  06/22/16   0742  02/17/16   0749  11/11/15   0934   09/29/14   0811   05/31/11   0530   WBC  7.1  11.8*  4.7  6.2  6.4  6.0  7.0   < >   --    < >   --    HGB  9.7*  12.2  13.2  12.1  12.3  13.2  12.9   < >  12.8   < >  13.4   MCV  91  92  94  95  94  94  93   < >   --    < >   --    PLT  224  303  281  298  298  315  309   < >   --    < >  355   INR   --   1.02   --    --    --    --    --    --   1.04   --   0.99    < > = values in this interval not displayed.     No lab results found.    Invalid input(s): FERRITIN  Recent Labs   Lab Test  05/06/17   1300  05/03/17   1309  03/08/17   0752  12/07/16   0818  06/22/16   0742  02/17/16   0749  11/11/15   0934   POTASSIUM  4.0  3.5  3.5  4.1  4.6  4.5  4.3   CHLORIDE  103  102  108  107  110*  108  108   BUN  34*  32*  22  30  26  28  29     Recent Labs   Lab Test  05/06/17   1512  05/03/17   1309  05/03/17   1305  12/07/16   0818  06/22/16   0742  02/17/16   0749  11/11/15   0934  11/05/14   0730  01/09/13   0719  10/25/12   1449   PROTEIN  Negative   --   Negative   --    --    --    --    --    --   Negative   ALBUMIN   --   3.5   --   3.3*  3.1*  3.3*  3.4  2.9*  3.6   --    BILITOTAL   --   0.5   --   0.5  0.4  0.5  0.5  0.3  0.5   --    AST   --   24   --   25  24  25  31  19  32   --    ALT   --   21   --   22  21  23  24  15  37   --    AMYLASE   --   45   --    --    --    --    --    --    --    --    LIPASE   --   358   --    --    --    --    --    --    --    --

## 2017-05-08 VITALS
TEMPERATURE: 97.9 F | OXYGEN SATURATION: 96 % | HEART RATE: 86 BPM | DIASTOLIC BLOOD PRESSURE: 59 MMHG | RESPIRATION RATE: 16 BRPM | SYSTOLIC BLOOD PRESSURE: 117 MMHG | WEIGHT: 130 LBS | BODY MASS INDEX: 22.2 KG/M2 | HEIGHT: 64 IN

## 2017-05-08 LAB
ANION GAP SERPL CALCULATED.3IONS-SCNC: 8 MMOL/L (ref 3–14)
BUN SERPL-MCNC: 14 MG/DL (ref 7–30)
CALCIUM SERPL-MCNC: 7.8 MG/DL (ref 8.5–10.1)
CHLORIDE SERPL-SCNC: 113 MMOL/L (ref 94–109)
CO2 SERPL-SCNC: 22 MMOL/L (ref 20–32)
CREAT SERPL-MCNC: 0.68 MG/DL (ref 0.52–1.04)
GFR SERPL CREATININE-BSD FRML MDRD: 81 ML/MIN/1.7M2
GLUCOSE SERPL-MCNC: 85 MG/DL (ref 70–99)
HGB BLD-MCNC: 9.1 G/DL (ref 11.7–15.7)
POTASSIUM SERPL-SCNC: 3.4 MMOL/L (ref 3.4–5.3)
SODIUM SERPL-SCNC: 143 MMOL/L (ref 133–144)

## 2017-05-08 PROCEDURE — 25000125 ZZHC RX 250: Performed by: INTERNAL MEDICINE

## 2017-05-08 PROCEDURE — 99239 HOSP IP/OBS DSCHRG MGMT >30: CPT | Performed by: INTERNAL MEDICINE

## 2017-05-08 PROCEDURE — 36415 COLL VENOUS BLD VENIPUNCTURE: CPT | Performed by: INTERNAL MEDICINE

## 2017-05-08 PROCEDURE — 80048 BASIC METABOLIC PNL TOTAL CA: CPT | Performed by: INTERNAL MEDICINE

## 2017-05-08 PROCEDURE — 85018 HEMOGLOBIN: CPT | Performed by: INTERNAL MEDICINE

## 2017-05-08 PROCEDURE — 25000132 ZZH RX MED GY IP 250 OP 250 PS 637: Mod: GY | Performed by: INTERNAL MEDICINE

## 2017-05-08 RX ORDER — EZETIMIBE 10 MG/1
10 TABLET ORAL AT BEDTIME
Status: DISCONTINUED | OUTPATIENT
Start: 2017-05-08 | End: 2017-05-08 | Stop reason: HOSPADM

## 2017-05-08 RX ORDER — ALENDRONATE SODIUM 35 MG/1
35 TABLET ORAL WEEKLY
Status: DISCONTINUED | OUTPATIENT
Start: 2017-05-08 | End: 2017-05-08

## 2017-05-08 RX ORDER — AMLODIPINE BESYLATE 10 MG/1
10 TABLET ORAL DAILY
Status: DISCONTINUED | OUTPATIENT
Start: 2017-05-08 | End: 2017-05-08 | Stop reason: HOSPADM

## 2017-05-08 RX ORDER — FENOFIBRATE 160 MG/1
160 TABLET ORAL DAILY
Status: DISCONTINUED | OUTPATIENT
Start: 2017-05-08 | End: 2017-05-08 | Stop reason: HOSPADM

## 2017-05-08 RX ORDER — PANTOPRAZOLE SODIUM 40 MG/1
40 TABLET, DELAYED RELEASE ORAL 2 TIMES DAILY
Qty: 60 TABLET | Refills: 3 | Status: SHIPPED | OUTPATIENT
Start: 2017-05-08 | End: 2020-05-13

## 2017-05-08 RX ADMIN — PANTOPRAZOLE SODIUM 40 MG: 40 INJECTION, POWDER, FOR SOLUTION INTRAVENOUS at 08:16

## 2017-05-08 RX ADMIN — METOPROLOL TARTRATE 50 MG: 50 TABLET, FILM COATED ORAL at 08:15

## 2017-05-08 NOTE — PHARMACY
"The following home medications were NOT continued on inpatient admission per \"Discontinuation of nonessential home medications during hospitalization\" policy: Fosamax    If a therapeutic holiday is deemed inappropriate per the prescriber, please notify the pharmacist regarding the medication order.    The pharmacist is available to answer any questions and/or concerns the patient may have regarding discontinuation of non-essential medications.    Please ensure that these medications are restarted as needed upon discharge via the medication reconciliation discharge process and included on the discharge medication reconciliation report.    Thank you,  Tri Pierce    "

## 2017-05-08 NOTE — PLAN OF CARE
Problem: Gastrointestinal Bleeding (Adult)  Goal: Signs and Symptoms of Listed Potential Problems Will be Absent or Manageable (Gastrointestinal Bleeding)  Signs and symptoms of listed potential problems will be absent or manageable by discharge/transition of care (reference Gastrointestinal Bleeding (Adult) CPG).   Outcome: Adequate for Discharge Date Met:  05/08/17  Pt A/Ox4. VSS. Independent in room, SBA ambulating in jauregui x1. Denies pain. Regular diet tolerated. CMS intact. Voiding adequately. D/c to home with niece at 1410 via wheelchair. AVS and cost of medication reviewed with pt and niece. Denies pain at d/c.

## 2017-05-08 NOTE — DISCHARGE SUMMARY
[facility]    Discharge Summary  Hospitalist    Date of Admission:  5/6/2017  Date of Discharge:  5/8/2017  Discharging Provider: Kapil Wells MD  Date of Service (when I saw the patient): 05/08/17    Discharge Diagnoses   GI bleed secondary to gastric ulcer  Hypertension  Acute renal failure, resolved  Hyperlipidemia    History of Present Illness   Mrs. Fall is a pleasant  88 y/o female who presented to the hospital secondary to an episode of lightheadedness, near syncope and black tarry stool.    Hospital Course   Lori Fall was admitted on 5/6/2017.  The following problems were addressed during her hospitalization:    GI bleed secondary to gastric ulcer  Mrs. Fall presented as above secondary to lightheadedness and near syncope as well as with black tarry stool. EGD was performed during her hospitalization and she was found to have an ulcer as suspected source of bleed. GI recommended 2 months of BID PPI and will be discharged on pantoprazole 40 mg BID. She will also need follow up with GI in 2 months to follow up her ulcer.    Hypertension  Outpatient normally on amlodipine 10 mg daily, lisinopril 40 mg daily and metoprolol 100 mg BID. Blood pressures at the time of discharge were under good control solely on metoprolol. I've asked her to hold her lisinopril until seen by her primary to avoid potential hypertension. Defer to primary to restart if blood pressures elevated after discharge.     Acute renal failure, resolved  Creatinine on admission at 1.12, improved to 0.68 on the day of discharge. Holding lisinopril as above given normal blood pressures off lisinopril during her hospitalization.     Hyperlipidemia  Continue prior to admission simvastatin and ezetimibe    Osteoporosis  Continue prior to admission management with alendronate and Ca with vitamin D supplement.     Kapil Wells M.D.  Hospitalist  Pager [phone]    Significant Results and Procedures    CT chest/ abdomen/ pelvis  Abdominal ultrasound  EGD    Pending Results   These results will be followed up by [facility]  [facility] Ordered in the Past 30 Days of this Admission     Date and Time Order Name Status Description    5/7/2017 1108 Surgical pathology exam In process           Code Status   DNR / DNI       Primary Care Physician   Alcides Guzman    Physical Exam   Temp: 97.9  F (36.6  C) Temp src: Oral BP: 135/62   Heart Rate: 81 Resp: 16 SpO2: 94 % O2 Device: None (Room air)    Vitals:    05/06/17 1237 05/06/17 1246   Weight: 57.2 kg (126 lb) 59 kg (130 lb)     Vital Signs with Ranges  Temp:  [97.3  F (36.3  C)-98.9  F (37.2  C)] 97.9  F (36.6  C)  Heart Rate:  [75-98] 81  Resp:  [14-30] 16  BP: (109-135)/(49-63) 135/62  SpO2:  [94 %-100 %] 94 %  I/O last 3 completed shifts:  In: 726 [P.O.:120; I.V.:606]  Out: -     Constitutional: Alert, oriented, no acute distress  Respiratory: Lungs clear to auscultation bilaterally, no wheezes, no crackles  Cardiovascular: Regular rate and rhythm, no murmurs  GI: Soft, non-tender, non-disteneded, good bowel sounds  Skin/Integumen: No erythema, cyanosis or edema  Other:      Discharge Disposition   Discharged to home  Condition at discharge: Stable    Consultations This Hospital Stay   GASTROENTEROLOGY IP CONSULT  CARE COORDINATOR IP CONSULT    Time Spent on this Encounter   IKapil, personally saw the patient today and spent greater than 30 minutes discharging this patient.    Discharge Orders     Reason for your hospital stay   You were hospitalized secondary to a bleeding ulcer     Follow-up and recommended labs and tests    Follow up with primary care provider, Alcides Guzman, on 5/10/2017 for hospital follow- up.  The following labs/tests are recommended: hemoglobin.     Activity   Your activity upon discharge: activity as tolerated     When to contact your care team   Call your primary doctor if you have any of the  following: increased pain or recurrent bloody or black stools.     Discharge Instructions   Please hold lisinopril until instructed to restart by your primary care doctor. Stop taking aspirin secondary to your ulcer.     DNR/DNI     Diet   Follow this diet upon discharge: Orders Placed This Encounter     Regular Diet Adult       Discharge Medications   Current Discharge Medication List      START taking these medications    Details   pantoprazole (PROTONIX) 40 MG EC tablet Take 1 tablet (40 mg) by mouth 2 times daily  Qty: 60 tablet, Refills: 3    Associated Diagnoses: Gastrointestinal hemorrhage associated with gastric ulcer         CONTINUE these medications which have NOT CHANGED    Details   amLODIPine (NORVASC) 10 MG tablet Take 1 tablet (10 mg) by mouth daily  Qty: 90 tablet, Refills: 3    Comments: Keep on file until pt calls for refills.  Associated Diagnoses: Essential hypertension with goal blood pressure less than 130/80      alendronate (FOSAMAX) 35 MG tablet Take 1 tablet (35 mg) by mouth once a week  Qty: 13 tablet, Refills: 3    Comments: Keep on file until pt calls for refills.  Associated Diagnoses: Osteopenia      fenofibrate 160 MG tablet Take 1 tablet (160 mg) by mouth daily  Qty: 90 tablet, Refills: 3    Comments: Keep on file until pt calls for refills.  Associated Diagnoses: Hyperlipidemia LDL goal <100      metoprolol (LOPRESSOR) 100 MG tablet Take 1 tablet (100 mg) by mouth 2 times daily  Qty: 180 tablet, Refills: 3    Comments: Keep on file until pt calls for refills.  Associated Diagnoses: Essential hypertension with goal blood pressure less than 130/80      simvastatin (ZOCOR) 40 MG tablet Take 1 tablet (40 mg) by mouth At Bedtime  Qty: 90 tablet, Refills: 3    Comments: Keep on file until pt calls for refills.  Associated Diagnoses: Hyperlipidemia LDL goal <100      ezetimibe (ZETIA) 10 MG tablet Take 1 tablet (10 mg) by mouth At Bedtime  Qty: 90 tablet, Refills: 3    Comments: Keep on  file until pt calls for refills.  Associated Diagnoses: Hyperlipidemia LDL goal <100      Calcium Carbonate-Vitamin D (CALCIUM + D) 600-200 MG-UNIT per tablet Take 2 tablets by mouth 2 times daily.         STOP taking these medications       omeprazole (PRILOSEC) 40 MG capsule Comments:   Reason for Stopping:         lisinopril (PRINIVIL/ZESTRIL) 40 MG tablet Comments:   Reason for Stopping:         aspirin 81 MG tablet Comments:   Reason for Stopping:             Allergies   Allergies   Allergen Reactions     Aminoglycosides      neosporin onintment - rash     Hctz Hives     Penicillins      Sulfa Drugs      Data   Most Recent 3 CBC's:  Recent Labs   Lab Test  05/08/17   0811  05/07/17   0850  05/06/17   1300  05/03/17   1309  03/08/17   0752   WBC   --    --   7.1  11.8*  4.7   HGB  9.1*  9.3*  9.7*  12.2  13.2   MCV   --    --   91  92  94   PLT   --    --   224  303  281      Most Recent 3 BMP's:  Recent Labs   Lab Test  05/08/17   0811  05/06/17   1300  05/03/17   1309   NA  143  138  139   POTASSIUM  3.4  4.0  3.5   CHLORIDE  113*  103  102   CO2  22  28  34*   BUN  14  34*  32*   CR  0.68  1.12*  0.93   ANIONGAP  8  7  3   PRAKASH  7.8*  9.1  11.4*   GLC  85  117*  117*     Most Recent 2 LFT's:  Recent Labs   Lab Test  05/03/17   1309  12/07/16   0818   AST  24  25   ALT  21  22   ALKPHOS  45  41   BILITOTAL  0.5  0.5     Most Recent INR's and Anticoagulation Dosing History:  Anticoagulation Dose History     Recent Dosing and Labs Latest Ref Rng & Units 3/29/2005 3/13/2006 5/31/2011 9/29/2014 5/3/2017    INR 0.86 - 1.14 0.99 0.95 0.99 1.04 1.02        Most Recent 3 Troponin's:  Recent Labs   Lab Test  05/06/17   1300   TROPI  0.024     Most Recent Cholesterol Panel:  Recent Labs   Lab Test  12/07/16   0818   CHOL  132   LDL  70   HDL  47*   TRIG  73     Most Recent 6 Bacteria Isolates From Any Culture (See EPIC Reports for Culture Details):  Recent Labs   Lab Test  05/03/17   1305  10/25/12   1545   [facility]  <10,000  colonies/mL mixed urogenital ryder  50 to 100,000 colonies/mL Mixed gram positive ryder Multiple species present, probable perineal contamination. Susceptibility testing not routinely done     Most Recent TSH, T4 and A1c Labs:  Recent Labs   Lab Test  12/07/16   0818   TSH  1.26   T4  1.10   A1C  5.4     Results for orders placed or performed during the hospital encounter of 05/03/17   CT Chest Abdomen Pelvis w/o & w Contrast    Narrative    CT CHEST, ABDOMEN AND PELVIS WITHOUT AND WITH CONTRAST  5/3/2017 2:13  PM    HISTORY:  Evaluate for pancreatitis, aortic dissection. Epigastric  pain.    TECHNIQUE: CT scan obtained of the chest, abdomen, and pelvis with  oral and IV contrast. 75 mL Isovue-370 injected. Radiation dose for  this scan was reduced using automated exposure control, adjustment of  the mA and/or kV according to patient size, or iterative  reconstruction technique.    COMPARISON:  None.    FINDINGS:  Chest: No acute thoracic aortic abnormality. No thoracic aortic  dissection. Thoracic aortic and coronary artery calcifications. No  evidence for pulmonary embolism. Moderate hiatal hernia. No enlarged  lymph nodes identified. Two adjacent pulmonary nodules at the right  upper lobe, largest measuring 0.6 cm, series 8 image 18. Other nodule  seen laterally. There are a few other scattered nodules also  identified. No lobar consolidation. Minor atelectasis at the left lung  base.    Abdomen/pelvis: Diffuse aortic calcifications. No evidence for  abdominal aortic aneurysm or dissection. No acute aortic abnormality  is seen. Bilateral hip arthroplasties obscures the pelvis with streak  artifact. There is some edema suggested involving the gastric antrum  and proximal duodenum. There is mild edema of the adjacent fat.  Decompressed gallbladder. Cholelithiasis demonstrated at the expected  region of the gallbladder neck, image 37 series 12. Some mid  gallbladder wall enhancement and thickening. The liver  appears  lobulated. There is a rounded enhancing nodule at the tip of the  pancreatic tail that is 2 cm, series 12 image 17. The spleen is not  seen. This may just be a splenule as it appears homogeneous. There is  a small hypodense region medially adjacent to this nodule on series 12  image 17 as well. Remainder of the pancreas, adrenals, and kidneys  show no acute abnormalities. No acute bowel abnormality. No bowel  obstruction. Appendix not seen. No secondary signs for appendicitis.  Appendix may be obscured by pelvis streak artifact. Degenerative  changes of the lumbar spine.      Impression    IMPRESSION:  1. No aortic dissection or acute aortic abnormality. No pulmonary  embolism or acute chest disease.  2. Some edema of the distal stomach and proximal duodenum could  represent gastritis or duodenitis. Cannot exclude underlying gastric  or duodenal peptic ulcer disease. No free air or free fluid.  3. Cholelithiasis. Contracted appearance of the gallbladder. Some  enhancement is nonspecific within the mid gallbladder that is  decompressed.  4. A few nonspecific pulmonary nodules. See below for follow up  recommendation.  5. Homogeneously enhancing rounded nodule at the pancreatic tail tip  may just be a splenule. This has some mild heterogeneity medially with  a potential small cyst. Recommend 6-12 month follow-up CT abdomen for  surveillance.  6. Moderate hiatal hernia.    Recommendations for an incidental lung nodule = or > 6mm to 8mm:    Low risk patients: Initial follow-up CT at 6-12 months, then  consider CT at 18-24 months if no change.    High risk patients: Initial follow-up CT at 6-12 months, then CT at  18-24 months if no change.    *Low Risk: Minimal or absent history of smoking or other known risk  factors.  *Nonsolid (ground-glass) or partly solid nodules may require longer  follow-up to exclude indolent adenocarcinoma.  *Recommendations based on Guidelines for the Management of  Incidental  Pulmonary Nodules Detected at CT: From the Fleischner Society 2017,  Radiology 2017.    VITALIY NAQVI MD

## 2017-05-08 NOTE — PLAN OF CARE
Problem: Goal Outcome Summary  Goal: Goal Outcome Summary  Outcome: No Change  A&Ox4. VSS. Denies pain. No signs symptoms of bleeding, wiped bottom after urinating with brown residual on toilet paper, pt stated it is much improved from previous BM and residual. Up with 1. Possible D/C 5/8 pending Hbg lab in am. Regular diet.

## 2017-05-08 NOTE — PROGRESS NOTES
"GASTROENTEROLOGY PROGRESS NOTE        SUBJECTIVE:  BM yesterday that was brown. No abdominal pain, nausea, or vomiting.      OBJECTIVE:    /62  Pulse 86  Temp 97.9  F (36.6  C) (Oral)  Resp 16  Ht 1.626 m (5' 4\")  Wt 59 kg (130 lb)  SpO2 94%  BMI 22.31 kg/m2  Temp (24hrs), Av.2  F (36.8  C), Min:97.3  F (36.3  C), Max:98.9  F (37.2  C)    Patient Vitals for the past 72 hrs:   Weight   17 1246 59 kg (130 lb)   17 1237 57.2 kg (126 lb)       Intake/Output Summary (Last 24 hours) at 17 09  Last data filed at 17 1359   Gross per 24 hour   Intake              726 ml   Output                0 ml   Net              726 ml        PHYSICAL EXAM     Constitutional: NAD  Abdomen: soft, NTTP     Additional Comments:  ROS, FH, SH: See initial GI consult for details.     I have reviewed the patient's new clinical lab results:     Recent Labs   Lab Test  17   0811  17   0850  17   1300  17   1309  17   0752   14   0811   11   0530   WBC   --    --   7.1  11.8*  4.7   < >   --    < >   --    HGB  9.1*  9.3*  9.7*  12.2  13.2   < >  12.8   < >  13.4   MCV   --    --   91  92  94   < >   --    < >   --    PLT   --    --   224  303  281   < >   --    < >  355   INR   --    --    --   1.02   --    --   1.04   --   0.99    < > = values in this interval not displayed.     Recent Labs   Lab Test  17   0811  17   1300  17   1309   POTASSIUM  3.4  4.0  3.5   CHLORIDE  113*  103  102   CO2  22  28  34*   BUN  14  34*  32*   ANIONGAP  8  7  3     Recent Labs   Lab Test  17   1512  17   1309  17   1305  16   0818  16   0742   10/25/12   1449   ALBUMIN   --   3.5   --   3.3*  3.1*   < >   --    BILITOTAL   --   0.5   --   0.5  0.4   < >   --    ALT   --   21   --     21   < >   --    AST   --   24   --   25  24   < >   --    PROTEIN  Negative   --   Negative   --    --    --   Negative   LIPASE   --   358   --   "  --    --    --    --    AMYLASE   --   45   --    --    --    --    --     < > = values in this interval not displayed.     ENDOSCOPY    5/6 EGD Impression:          - Normal esophagus.   - Medium-sized hiatal hernia.    - Non-bleeding, benign appearing gastric ulcer with no stigmata of bleeding. Biopsied.   Low-risk for rebleeding.    - Normal examined duodenum.         ASSESSMENT/ PLAN    1. Gastric ulcer:    - Decline in hgb (Hgb=13.2 --> 12.2 --> 9.3) associated with an episode of abdominal pain, melena and near-syncope on admission.  - CT scan suggests possible abnl distal stomach and duodenum  - EGD yesterday with gastric ulcer  - Await pathology results.   - Use a proton pump inhibitor PO BID for 2 months.   - Repeat upper endoscopy in 2 months to check healing.   - Low risk for re-bleeding. Stable from a GI standpoint for DC.      Discussed with Dr. Moscoso.    Mercedes Dyson PA-C  [facility]

## 2017-05-08 NOTE — PLAN OF CARE
Problem: Goal Outcome Summary  Goal: Goal Outcome Summary  Outcome: No Change  A&O. VSS. Denies pain. No s/sx bleeding. Denies SOB. One small brown stool tonight. Up with 1. Plan to recheck hgb in AM. Possible D/C tomorrow.

## 2017-05-08 NOTE — PROGRESS NOTES
[facility]  Progress Note  [facility]    Follow up  visit.  Patient is feeling well and hoping to go home today.  She has a niece who is able to come for her this afternoon.  She spoke with her  this morning and he states that he is feeling well also at  acute rehab.  Patient has no concerns at this time.  Prayed with her.   Plan: No need for SH to follow as discharge seems imminent.     Elsie Hunt  Chaplain Resident  Pager [phone]

## 2017-05-09 LAB
ABO + RH BLD: ABNORMAL
ABO + RH BLD: ABNORMAL
BLD GP AB SCN SERPL QL: ABNORMAL
BLD PROD TYP BPU: ABNORMAL
BLOOD BANK CMNT PATIENT-IMP: ABNORMAL
BLOOD BANK CMNT PATIENT-IMP: ABNORMAL
NUM BPU REQUESTED: 1
SPECIMEN EXP DATE BLD: ABNORMAL

## 2017-05-10 ENCOUNTER — OFFICE VISIT (OUTPATIENT)
Dept: OTHER | Facility: CLINIC | Age: 82
End: 2017-05-10
Attending: INTERNAL MEDICINE
Payer: MEDICARE

## 2017-05-10 ENCOUNTER — HOSPITAL ENCOUNTER (OUTPATIENT)
Dept: LAB | Facility: CLINIC | Age: 82
Discharge: HOME | End: 2017-05-10
Attending: INTERNAL MEDICINE | Admitting: INTERNAL MEDICINE
Payer: MEDICARE

## 2017-05-10 VITALS
HEART RATE: 78 BPM | SYSTOLIC BLOOD PRESSURE: 129 MMHG | OXYGEN SATURATION: 98 % | BODY MASS INDEX: 22.14 KG/M2 | WEIGHT: 129 LBS | DIASTOLIC BLOOD PRESSURE: 68 MMHG

## 2017-05-10 DIAGNOSIS — R10.13 ABDOMINAL PAIN, EPIGASTRIC: ICD-10-CM

## 2017-05-10 DIAGNOSIS — K27.9 PUD (PEPTIC ULCER DISEASE): Primary | ICD-10-CM

## 2017-05-10 DIAGNOSIS — E78.5 HYPERLIPIDEMIA LDL GOAL <100: ICD-10-CM

## 2017-05-10 DIAGNOSIS — K27.9 PUD (PEPTIC ULCER DISEASE): ICD-10-CM

## 2017-05-10 DIAGNOSIS — K25.4 GASTROINTESTINAL HEMORRHAGE ASSOCIATED WITH GASTRIC ULCER: ICD-10-CM

## 2017-05-10 LAB
ANION GAP SERPL CALCULATED.3IONS-SCNC: 5 MMOL/L (ref 3–14)
BASOPHILS # BLD AUTO: 0 10E9/L (ref 0–0.2)
BASOPHILS NFR BLD AUTO: 0.2 %
BLD PROD TYP BPU: NORMAL
BLD UNIT ID BPU: 0
BLOOD PRODUCT CODE: NORMAL
BPU ID: NORMAL
BUN SERPL-MCNC: 12 MG/DL (ref 7–30)
CALCIUM SERPL-MCNC: 8.4 MG/DL (ref 8.5–10.1)
CHLORIDE SERPL-SCNC: 107 MMOL/L (ref 94–109)
CO2 SERPL-SCNC: 24 MMOL/L (ref 20–32)
CREAT SERPL-MCNC: 0.66 MG/DL (ref 0.52–1.04)
D DIMER PPP FEU-MCNC: 2 UG/ML FEU (ref 0–0.5)
DIFFERENTIAL METHOD BLD: ABNORMAL
EOSINOPHIL # BLD AUTO: 0.1 10E9/L (ref 0–0.7)
EOSINOPHIL NFR BLD AUTO: 1 %
ERYTHROCYTE [DISTWIDTH] IN BLOOD BY AUTOMATED COUNT: 16.2 % (ref 10–15)
GFR SERPL CREATININE-BSD FRML MDRD: 84 ML/MIN/1.7M2
GLUCOSE SERPL-MCNC: 101 MG/DL (ref 70–99)
HCT VFR BLD AUTO: 30.8 % (ref 35–47)
HGB BLD-MCNC: 10.2 G/DL (ref 11.7–15.7)
HGB BLD-MCNC: NORMAL G/DL (ref 11.7–15.7)
IMM GRANULOCYTES # BLD: 0 10E9/L (ref 0–0.4)
IMM GRANULOCYTES NFR BLD: 0.4 %
LYMPHOCYTES # BLD AUTO: 1.7 10E9/L (ref 0.8–5.3)
LYMPHOCYTES NFR BLD AUTO: 20.9 %
MCH RBC QN AUTO: 30.6 PG (ref 26.5–33)
MCHC RBC AUTO-ENTMCNC: 33.1 G/DL (ref 31.5–36.5)
MCV RBC AUTO: 93 FL (ref 78–100)
MONOCYTES # BLD AUTO: 1.2 10E9/L (ref 0–1.3)
MONOCYTES NFR BLD AUTO: 14.6 %
NEUTROPHILS # BLD AUTO: 5.2 10E9/L (ref 1.6–8.3)
NEUTROPHILS NFR BLD AUTO: 62.9 %
NRBC # BLD AUTO: 0 10*3/UL
NRBC BLD AUTO-RTO: 0 /100
PLATELET # BLD AUTO: 318 10E9/L (ref 150–450)
POTASSIUM SERPL-SCNC: 4.2 MMOL/L (ref 3.4–5.3)
RBC # BLD AUTO: 3.33 10E12/L (ref 3.8–5.2)
SODIUM SERPL-SCNC: 136 MMOL/L (ref 133–144)
TRANSFUSION STATUS PATIENT QL: NORMAL
TRANSFUSION STATUS PATIENT QL: NORMAL
TROPONIN I SERPL-MCNC: NORMAL UG/L (ref 0–0.04)
WBC # BLD AUTO: 8.2 10E9/L (ref 4–11)

## 2017-05-10 PROCEDURE — 85025 COMPLETE CBC W/AUTO DIFF WBC: CPT | Performed by: INTERNAL MEDICINE

## 2017-05-10 PROCEDURE — 85018 HEMOGLOBIN: CPT | Performed by: INTERNAL MEDICINE

## 2017-05-10 PROCEDURE — 99211 OFF/OP EST MAY X REQ PHY/QHP: CPT

## 2017-05-10 PROCEDURE — 85379 FIBRIN DEGRADATION QUANT: CPT | Performed by: INTERNAL MEDICINE

## 2017-05-10 PROCEDURE — 84484 ASSAY OF TROPONIN QUANT: CPT | Performed by: INTERNAL MEDICINE

## 2017-05-10 PROCEDURE — 36415 COLL VENOUS BLD VENIPUNCTURE: CPT | Performed by: INTERNAL MEDICINE

## 2017-05-10 PROCEDURE — 99214 OFFICE O/P EST MOD 30 MIN: CPT | Mod: ZP | Performed by: INTERNAL MEDICINE

## 2017-05-10 PROCEDURE — 80048 BASIC METABOLIC PNL TOTAL CA: CPT | Performed by: INTERNAL MEDICINE

## 2017-05-10 RX ORDER — EZETIMIBE 10 MG/1
10 TABLET ORAL AT BEDTIME
Qty: 90 TABLET | Refills: 3 | Status: SHIPPED | OUTPATIENT
Start: 2017-05-10 | End: 2020-05-13

## 2017-05-10 NOTE — MR AVS SNAPSHOT
"              After Visit Summary   5/10/2017    Lori Fall    MRN: [id number]           Patient Information     Date Of Birth          6/22/1929        Visit Information        Provider Department      5/10/2017 10:30 AM Alcides Guzman MD [facility]      Today's Diagnoses     PUD (peptic ulcer disease)    -  1    Hyperlipidemia LDL goal <100           Follow-ups after your visit        Future tests that were ordered for you today     Open Future Orders        Priority Expected Expires Ordered    CBC with platelets differential Routine 5/10/2017 5/10/2017 5/10/2017    Basic metabolic panel Routine 5/10/2017 5/10/2017 5/10/2017            Who to contact     If you have questions or need follow up information about today's clinic visit or your schedule please contact [facility] directly at [phone].  Normal or non-critical lab and imaging results will be communicated to you by MyChart, letter or phone within 4 business days after the clinic has received the results. If you do not hear from us within 7 days, please contact the clinic through [employer] or phone. If you have a critical or abnormal lab result, we will notify you by phone as soon as possible.  Submit refill requests through [employer] or call your pharmacy and they will forward the refill request to us. Please allow 3 business days for your refill to be completed.          Additional Information About Your Visit        MyChart Information     [employer] lets you send messages to your doctor, view your test results, renew your prescriptions, schedule appointments and more. To sign up, go to [email or web address] . Click on \"Log in\" on the left side of the screen, which will take you to the Welcome page. Then click on \"Sign up Now\" on the right side of the page.     You will be asked to enter the access code listed below, as well as some personal " information. Please follow the directions to create your username and password.     Your access code is: [email or web address]  Expires: 2017  6:01 PM     Your access code will  in 90 days. If you need help or a new code, please call your [facility] clinic or [phone].        Care EveryWhere ID     This is your Care EveryWhere ID. This could be used by other organizations to access your [facility] medical records  [id number]        Your Vitals Were     Pulse Pulse Oximetry Breastfeeding? BMI (Body Mass Index)          78 98% No 22.14 kg/m2         Blood Pressure from Last 3 Encounters:   05/10/17 129/68   17 117/59   17 125/65    Weight from Last 3 Encounters:   05/10/17 129 lb (58.5 kg)   17 130 lb (59 kg)   17 125 lb 9.6 oz (57 kg)                 Where to get your medicines      Some of these will need a paper prescription and others can be bought over the counter.  Ask your nurse if you have questions.     Bring a paper prescription for each of these medications     ezetimibe 10 MG tablet          Primary Care Provider Office Phone # Fax #    Alcides Guzman [phone]       [facility] [address]  [place] [address]        Thank you!     Thank you for choosing [facility] [place]  for your care. Our goal is always to provide you with excellent care. Hearing back from our patients is one way we can continue to improve our services. Please take a few minutes to complete the written survey that you may receive in the mail after your visit with us. Thank you!             Your Updated Medication List - Protect others around you: Learn how to safely use, store and throw away your medicines at www.disposemymeds.org.          This list is accurate as of: 5/10/17 11:19 AM.  Always use your most recent med list.                   Brand Name Dispense Instructions for use    alendronate 35 MG tablet    FOSAMAX    13 tablet    Take 1 tablet  (35 mg) by mouth once a week       amLODIPine 10 MG tablet    NORVASC    90 tablet    Take 1 tablet (10 mg) by mouth daily       calcium + D 600-200 MG-UNIT Tabs   Generic drug:  calcium carbonate-vitamin D      Take 2 tablets by mouth 2 times daily.       ezetimibe 10 MG tablet    ZETIA    90 tablet    Take 1 tablet (10 mg) by mouth At Bedtime       fenofibrate 160 MG tablet     90 tablet    Take 1 tablet (160 mg) by mouth daily       metoprolol 100 MG tablet    LOPRESSOR    180 tablet    Take 1 tablet (100 mg) by mouth 2 times daily       pantoprazole 40 MG EC tablet    PROTONIX    60 tablet    Take 1 tablet (40 mg) by mouth 2 times daily       simvastatin 40 MG tablet    ZOCOR    90 tablet    Take 1 tablet (40 mg) by mouth At Bedtime

## 2017-05-10 NOTE — NURSING NOTE
"Chief Complaint   Patient presents with     RECHECK     f/u to 05/03/17 per Dr. Guzman,needs hgb       Initial /68 (BP Location: Right arm, Patient Position: Chair, Cuff Size: Adult Regular)  Pulse 78  Wt 129 lb (58.5 kg)  SpO2 98%  Breastfeeding? No  BMI 22.14 kg/m2 Estimated body mass index is 22.14 kg/(m^2) as calculated from the following:    Height as of 5/6/17: 5' 4\" (1.626 m).    Weight as of this encounter: 129 lb (58.5 kg).  Medication Reconciliation: complete     Face to face nursing time: 8 minutes    Cristina Brody MA     "

## 2017-05-17 LAB — COPATH REPORT: NORMAL

## 2017-05-22 ENCOUNTER — TELEPHONE (OUTPATIENT)
Dept: OTHER | Facility: CLINIC | Age: 82
End: 2017-05-22

## 2017-05-22 DIAGNOSIS — K27.9 PUD (PEPTIC ULCER DISEASE): ICD-10-CM

## 2017-05-22 DIAGNOSIS — K25.4 GASTROINTESTINAL HEMORRHAGE ASSOCIATED WITH GASTRIC ULCER: Primary | ICD-10-CM

## 2017-05-22 RX ORDER — OMEPRAZOLE 40 MG/1
40 CAPSULE, DELAYED RELEASE ORAL 2 TIMES DAILY
Qty: 180 CAPSULE | Refills: 3 | Status: SHIPPED | OUTPATIENT
Start: 2017-05-22 | End: 2018-02-02

## 2017-05-22 NOTE — TELEPHONE ENCOUNTER
omeprazole      Last Written Prescription Date: 5/4/17  Last Fill Quantity: 20,  # refills: 0   Last Office Visit with [facility], [facility] or [facility] prescribing provider: 5/10/17  Refill done per RN refill protocol  Elijah Mancini RN, BSN

## 2017-05-23 NOTE — TELEPHONE ENCOUNTER
PA denied for omeprazole.  Under the patients plan she is not eligible to receive a discount on the non formulary medication even with formulary exception and explanation of need pertaining to LOV notes.  Patient does not have Coverage with Medicare part D(which would be covered) only A and B  Please advise   Elijah Mancini, RN, BSN

## 2017-10-11 DIAGNOSIS — N28.9 RENAL INSUFFICIENCY: ICD-10-CM

## 2017-10-11 DIAGNOSIS — E78.5 HYPERLIPIDEMIA LDL GOAL <100: ICD-10-CM

## 2017-10-11 DIAGNOSIS — I10 ESSENTIAL HYPERTENSION WITH GOAL BLOOD PRESSURE LESS THAN 130/80: ICD-10-CM

## 2017-10-11 LAB
ALBUMIN SERPL-MCNC: 3.6 G/DL (ref 3.4–5)
ALP SERPL-CCNC: 56 U/L (ref 40–150)
ALT SERPL W P-5'-P-CCNC: 25 U/L (ref 0–50)
ANION GAP SERPL CALCULATED.3IONS-SCNC: 10 MMOL/L (ref 3–14)
AST SERPL W P-5'-P-CCNC: 26 U/L (ref 0–45)
BILIRUB DIRECT SERPL-MCNC: 0.2 MG/DL (ref 0–0.2)
BILIRUB SERPL-MCNC: 0.6 MG/DL (ref 0.2–1.3)
BUN SERPL-MCNC: 25 MG/DL (ref 7–30)
CALCIUM SERPL-MCNC: 9.4 MG/DL (ref 8.5–10.1)
CHLORIDE SERPL-SCNC: 107 MMOL/L (ref 94–109)
CHOLEST SERPL-MCNC: 142 MG/DL
CO2 SERPL-SCNC: 25 MMOL/L (ref 20–32)
CREAT SERPL-MCNC: 0.79 MG/DL (ref 0.52–1.04)
GFR SERPL CREATININE-BSD FRML MDRD: 68 ML/MIN/1.7M2
GLUCOSE SERPL-MCNC: 97 MG/DL (ref 70–99)
HBA1C MFR BLD: 5.3 % (ref 4.3–6)
HDLC SERPL-MCNC: 66 MG/DL
LDLC SERPL CALC-MCNC: 62 MG/DL
NONHDLC SERPL-MCNC: 76 MG/DL
POTASSIUM SERPL-SCNC: 4 MMOL/L (ref 3.4–5.3)
PROT SERPL-MCNC: 6.9 G/DL (ref 6.8–8.8)
SODIUM SERPL-SCNC: 142 MMOL/L (ref 133–144)
T3FREE SERPL-MCNC: 3.1 PG/ML (ref 2.3–4.2)
T4 FREE SERPL-MCNC: 1.17 NG/DL (ref 0.76–1.46)
TRIGL SERPL-MCNC: 69 MG/DL
TSH SERPL DL<=0.005 MIU/L-ACNC: 2.15 MU/L (ref 0.4–4)

## 2017-10-11 PROCEDURE — 80061 LIPID PANEL: CPT | Performed by: INTERNAL MEDICINE

## 2017-10-11 PROCEDURE — 36415 COLL VENOUS BLD VENIPUNCTURE: CPT | Performed by: INTERNAL MEDICINE

## 2017-10-11 PROCEDURE — 80076 HEPATIC FUNCTION PANEL: CPT | Performed by: INTERNAL MEDICINE

## 2017-10-11 PROCEDURE — 84443 ASSAY THYROID STIM HORMONE: CPT | Performed by: INTERNAL MEDICINE

## 2017-10-11 PROCEDURE — 84481 FREE ASSAY (FT-3): CPT | Performed by: INTERNAL MEDICINE

## 2017-10-11 PROCEDURE — 83036 HEMOGLOBIN GLYCOSYLATED A1C: CPT | Performed by: INTERNAL MEDICINE

## 2017-10-11 PROCEDURE — 80048 BASIC METABOLIC PNL TOTAL CA: CPT | Performed by: INTERNAL MEDICINE

## 2017-10-11 PROCEDURE — 84439 ASSAY OF FREE THYROXINE: CPT | Performed by: INTERNAL MEDICINE

## 2017-10-18 ENCOUNTER — OFFICE VISIT (OUTPATIENT)
Dept: OTHER | Facility: CLINIC | Age: 82
End: 2017-10-18
Attending: INTERNAL MEDICINE
Payer: MEDICARE

## 2017-10-18 VITALS
BODY MASS INDEX: 20.66 KG/M2 | WEIGHT: 121 LBS | OXYGEN SATURATION: 93 % | HEIGHT: 64 IN | DIASTOLIC BLOOD PRESSURE: 68 MMHG | HEART RATE: 62 BPM | SYSTOLIC BLOOD PRESSURE: 143 MMHG

## 2017-10-18 DIAGNOSIS — I10 ESSENTIAL HYPERTENSION WITH GOAL BLOOD PRESSURE LESS THAN 130/80: ICD-10-CM

## 2017-10-18 DIAGNOSIS — K27.9 PUD (PEPTIC ULCER DISEASE): Primary | ICD-10-CM

## 2017-10-18 DIAGNOSIS — N28.9 RENAL INSUFFICIENCY: ICD-10-CM

## 2017-10-18 DIAGNOSIS — E78.5 HYPERLIPIDEMIA LDL GOAL <100: ICD-10-CM

## 2017-10-18 PROCEDURE — 99214 OFFICE O/P EST MOD 30 MIN: CPT | Mod: ZP | Performed by: INTERNAL MEDICINE

## 2017-10-18 PROCEDURE — 99211 OFF/OP EST MAY X REQ PHY/QHP: CPT

## 2017-10-18 RX ORDER — LISINOPRIL 40 MG/1
40 TABLET ORAL DAILY
Qty: 90 TABLET | Refills: 3 | Status: SHIPPED | OUTPATIENT
Start: 2017-10-18 | End: 2018-02-02

## 2017-10-18 NOTE — PROGRESS NOTES
Lori Fall is a 88 year old female who is presenting at the current time to discuss her diagnosi(es) of :       Renal insufficiency  Hyperlipidemia LDL goal <100  Essential hypertension with goal blood pressure less than 130/80  PUD (peptic ulcer disease) .      Review Of Systems  Skin: negative  Eyes: negative  Ears/Nose/Throat: negative  Respiratory: No shortness of breath, dyspnea on exertion, cough, or hemoptysis  Cardiovascular: negative  Gastrointestinal: negative  Genitourinary: negative  Musculoskeletal: negative  Neurologic: negative  Psychiatric: negative  Hematologic/Lymphatic/Immunologic: negative  Endocrine: negative    PAST MEDICAL HISTORY:                  Past Medical History:   Diagnosis Date     Essential hypertension, benign     abstracted 7/5/02     Hyperlipidemia LDL goal < 100      Impaired fasting glucose      NONSPECIFIC MEDICAL HISTORY     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     Osteopenia      Primary thrombocytopenia 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     PUD (peptic ulcer disease) 1960s     Pure hypercholesterolemia     abstracted 7/5/02       PAST SURGICAL HISTORY:                  Past Surgical History:   Procedure Laterality Date     C NONSPECIFIC PROCEDURE  1995    splenectomy -- abstracted 7/5/02     C NONSPECIFIC PROCEDURE  1977    EDWIN  BSO appendectomy     C NONSPECIFIC PROCEDURE  1994    benign breast bopsy     C NONSPECIFIC PROCEDURE  6/28/2010    Left total hip arthroplasty, Bijal uncemented components     ESOPHAGOSCOPY, GASTROSCOPY, DUODENOSCOPY (EGD), COMBINED N/A 5/7/2017    Procedure: COMBINED ESOPHAGOSCOPY, GASTROSCOPY, DUODENOSCOPY (EGD), BIOPSY SINGLE OR MULTIPLE;  EGD;  Surgeon: Guero Olmedo MD;  Location:  GI     TONSILLECTOMY & ADENOIDECTOMY  1956       CURRENT MEDICATIONS:                  Current Outpatient Prescriptions   Medication Sig Dispense Refill     lisinopril (PRINIVIL/ZESTRIL) 40 MG tablet Take 1 tablet (40 mg) by mouth daily  90 tablet 3     ezetimibe (ZETIA) 10 MG tablet TAKE 1 TABLET BY MOUTH AT BEDTIME 90 tablet 3     omeprazole (PRILOSEC) 40 MG capsule Take 1 capsule (40 mg) by mouth 2 times daily Take 30-60 minutes before a meal. 180 capsule 3     ezetimibe (ZETIA) 10 MG tablet Take 1 tablet (10 mg) by mouth At Bedtime 90 tablet 3     pantoprazole (PROTONIX) 40 MG EC tablet Take 1 tablet (40 mg) by mouth 2 times daily 60 tablet 3     amLODIPine (NORVASC) 10 MG tablet Take 1 tablet (10 mg) by mouth daily 90 tablet 3     alendronate (FOSAMAX) 35 MG tablet Take 1 tablet (35 mg) by mouth once a week 13 tablet 3     fenofibrate 160 MG tablet Take 1 tablet (160 mg) by mouth daily 90 tablet 3     metoprolol (LOPRESSOR) 100 MG tablet Take 1 tablet (100 mg) by mouth 2 times daily 180 tablet 3     simvastatin (ZOCOR) 40 MG tablet Take 1 tablet (40 mg) by mouth At Bedtime 90 tablet 3     Calcium Carbonate-Vitamin D (CALCIUM + D) 600-200 MG-UNIT per tablet Take 2 tablets by mouth 2 times daily.         ALLERGIES:                  Allergies   Allergen Reactions     Aminoglycosides      neosporin onintment - rash     Hctz Hives     Penicillins      Sulfa Drugs        SOCIAL HISTORY:                  Social History     Social History     Marital status:      Spouse name: Nhan     Number of children: 1     Years of education: 12     Occupational History     retired      retired       Retired     Social History Main Topics     Smoking status: Never Smoker     Smokeless tobacco: Never Used     Alcohol use 0.0 oz/week     0 Standard drinks or equivalent per week      Comment: occasional wine     Drug use: No     Sexual activity: Yes     Partners: Male     Other Topics Concern     Not on file     Social History Narrative       FAMILY HISTORY:                   Family History   Problem Relation Age of Onset     HEART DISEASE Mother      d:age 66     HEART DISEASE Father      d:age 62     Family History Negative Brother      d:age 35   in war     CANCER Brother      d:age 53 cancer esophagus, alcoholic     HEART DISEASE Brother      b:1933 bypass surgery         Physical exam Reveals:    O/P: WNL  HEENT: WNL  NECK: No JVD, thyromegaly, or lymphadenopathy  HEART: RRR, no murmurs, gallops, or rubs  LUNGS: CTA bilaterally without rales, wheezes, or rhonchi  GI: NABS, nondistended, nontender, soft  EXT:without cyanosis, clubbing, or edema  NEURO: nonfocal  : no flank tenderness      A/P:    (K27.9) PUD (peptic ulcer disease)  (primary encounter diagnosis)  Comment: having another EGD to document healing 17, this was a   Plan: Follow-Up with Vascular Medicine, CBC with         platelets differential           (N28.9) Renal insufficiency  Comment: stable  Plan: Follow-Up with Vascular Medicine, Basic         metabolic panel, Hepatic panel, Lipid Profile           (E78.5) Hyperlipidemia LDL goal <100  Comment: at gol  Plan: Follow-Up with Vascular Medicine, T3 Free, T4         free, TSH, Lipid Profile         (I10) Essential hypertension with goal blood pressure less than 130/80  Comment: not at goal, add lisinopril back in which was stopped by hopitlaist after  discharge  Plan: lisinopril (PRINIVIL/ZESTRIL) 40 MG tablet,         Follow-Up with Vascular Medicine, Basic         metabolic panel

## 2017-10-18 NOTE — MR AVS SNAPSHOT
After Visit Summary   10/18/2017    Lori Fall    MRN: [id number]           Patient Information     Date Of Birth          6/22/1929        Visit Information        Provider Department      10/18/2017 12:30 PM Alcides Guzman MD [facility]      Today's Diagnoses     PUD (peptic ulcer disease)    -  1    Renal insufficiency        Hyperlipidemia LDL goal <100        Essential hypertension with goal blood pressure less than 130/80           Follow-ups after your visit        Additional Services     Follow-Up with Vascular Medicine                 Your next 10 appointments already scheduled     Oct 25, 2017  2:30 PM CDT   Return Visit with Alcides Guzman MD   [facility] [place] [facility] ([facility])    [address]  [facility] [id number] [phone]              Future tests that were ordered for you today     Open Future Orders        Priority Expected Expires Ordered    Follow-Up with Vascular Medicine Routine 1/18/2018 2/18/2018 10/18/2017    CBC with platelets differential Routine 1/18/2018 2/18/2018 10/18/2017    T3 Free Routine 1/18/2018 2/18/2018 10/18/2017    T4 free Routine 1/18/2018 2/18/2018 10/18/2017    TSH Routine 1/18/2018 2/18/2018 10/18/2017    Basic metabolic panel Routine 1/18/2018 2/18/2018 10/18/2017    Hepatic panel Routine 1/18/2018 2/18/2018 10/18/2017    Lipid Profile Routine 1/18/2018 2/18/2018 10/18/2017            Who to contact     If you have questions or need follow up information about today's clinic visit or your schedule please contact [facility] directly at [phone].  Normal or non-critical lab and imaging results will be communicated to you by MyChart, letter or phone within 4 business days after the clinic has received the results. If you do not hear from us within 7 days,  "please contact the clinic through [employer] or phone. If you have a critical or abnormal lab result, we will notify you by phone as soon as possible.  Submit refill requests through [employer] or call your pharmacy and they will forward the refill request to us. Please allow 3 business days for your refill to be completed.          Additional Information About Your Visit        [employer] Information     [employer] lets you send messages to your doctor, view your test results, renew your prescriptions, schedule appointments and more. To sign up, go to [email or web address] . Click on \"Log in\" on the left side of the screen, which will take you to the Welcome page. Then click on \"Sign up Now\" on the right side of the page.     You will be asked to enter the access code listed below, as well as some personal information. Please follow the directions to create your username and password.     Your access code is: [email or web address]  Expires: 2018  1:17 PM     Your access code will  in 90 days. If you need help or a new code, please call your [place] clinic or [phone].        Care EveryWhere ID     This is your Care EveryWhere ID. This could be used by other organizations to access your [place] medical records  [id number]        Your Vitals Were     Pulse Height Pulse Oximetry BMI (Body Mass Index)          62 5' 4\" (1.626 m) 93% 20.77 kg/m2         Blood Pressure from Last 3 Encounters:   10/18/17 143/68   05/10/17 129/68   17 117/59    Weight from Last 3 Encounters:   10/18/17 121 lb (54.9 kg)   05/10/17 129 lb (58.5 kg)   17 130 lb (59 kg)              We Performed the Following     Follow-Up with Vascular Medicine          Today's Medication Changes          These changes are accurate as of: 10/18/17  1:17 PM.  If you have any questions, ask your nurse or doctor.               Start taking these medicines.        Dose/Directions    lisinopril 40 MG tablet   Commonly known as:  PRINIVIL/ZESTRIL   Used " for:  Essential hypertension with goal blood pressure less than 130/80   Started by:  Alcides Guzman MD        Dose:  40 mg   Take 1 tablet (40 mg) by mouth daily   Quantity:  90 tablet   Refills:  3            Where to get your medicines      These medications were sent to [id number] - [facility] [address] AT [facility] [address]  [address], [facility] [address]     Phone:  [phone]     lisinopril 40 MG tablet                Primary Care Provider Office Phone # Fax #    Alcides Guzman [phone]       [facility] [address]  [place] [address]        Equal Access to Services     ARTHUR MORGAN : Hadii vanessa patino hadasho Soomaali, waaxda luqadaha, qaybta kaalmada adeegyada, waxay cecilein haymaureen ma. So [facility] [phone].    ATENCIÓN: Si habla español, tiene a williamson disposición servicios gratuitos de asistencia lingüística. [facility] [phone].    We comply with applicable federal civil rights laws and [place] laws. We do not discriminate on the basis of race, color, national origin, age, disability, sex, sexual orientation, or gender identity.            Thank you!     Thank you for choosing [facility] [place]  for your care. Our goal is always to provide you with excellent care. Hearing back from our patients is one way we can continue to improve our services. Please take a few minutes to complete the written survey that you may receive in the mail after your visit with us. Thank you!             Your Updated Medication List - Protect others around you: Learn how to safely use, store and throw away your medicines at www.disposemymeds.org.          This list is accurate as of: 10/18/17  1:17 PM.  Always use your most recent med list.                   Brand Name Dispense Instructions for use Diagnosis    alendronate 35 MG tablet    FOSAMAX    13 tablet    Take 1 tablet (35 mg) by mouth once a  week    Osteopenia       amLODIPine 10 MG tablet    NORVASC    90 tablet    Take 1 tablet (10 mg) by mouth daily    Essential hypertension with goal blood pressure less than 130/80       calcium + D 600-200 MG-UNIT Tabs   Generic drug:  calcium carbonate-vitamin D      Take 2 tablets by mouth 2 times daily.        * ezetimibe 10 MG tablet    ZETIA    90 tablet    Take 1 tablet (10 mg) by mouth At Bedtime    Hyperlipidemia LDL goal <100       * ezetimibe 10 MG tablet    ZETIA    90 tablet    TAKE 1 TABLET BY MOUTH AT BEDTIME    Hyperlipidemia LDL goal <100       fenofibrate 160 MG tablet     90 tablet    Take 1 tablet (160 mg) by mouth daily    Hyperlipidemia LDL goal <100       lisinopril 40 MG tablet    PRINIVIL/ZESTRIL    90 tablet    Take 1 tablet (40 mg) by mouth daily    Essential hypertension with goal blood pressure less than 130/80       metoprolol 100 MG tablet    LOPRESSOR    180 tablet    Take 1 tablet (100 mg) by mouth 2 times daily    Essential hypertension with goal blood pressure less than 130/80       omeprazole 40 MG capsule    priLOSEC    180 capsule    Take 1 capsule (40 mg) by mouth 2 times daily Take 30-60 minutes before a meal.    Gastrointestinal hemorrhage associated with gastric ulcer, PUD (peptic ulcer disease)       pantoprazole 40 MG EC tablet    PROTONIX    60 tablet    Take 1 tablet (40 mg) by mouth 2 times daily    Gastrointestinal hemorrhage associated with gastric ulcer       simvastatin 40 MG tablet    ZOCOR    90 tablet    Take 1 tablet (40 mg) by mouth At Bedtime    Hyperlipidemia LDL goal <100       * Notice:  This list has 2 medication(s) that are the same as other medications prescribed for you. Read the directions carefully, and ask your doctor or other care provider to review them with you.

## 2017-10-18 NOTE — NURSING NOTE
"Chief Complaint   Patient presents with     RECHECK     5 month F/U. Labs done on 10/11/17 at [facility].        Initial /68 (BP Location: Left arm, Patient Position: Chair, Cuff Size: Adult Regular)  Pulse 62  Ht 5' 4\" (1.626 m)  Wt 121 lb (54.9 kg)  SpO2 93%  BMI 20.77 kg/m2 Estimated body mass index is 20.77 kg/(m^2) as calculated from the following:    Height as of this encounter: 5' 4\" (1.626 m).    Weight as of this encounter: 121 lb (54.9 kg).  Medication Reconciliation: complete    Face to Face time 5 minutes  Destiny Murrell CMA      "

## 2017-10-25 ENCOUNTER — OFFICE VISIT (OUTPATIENT)
Dept: OTHER | Facility: CLINIC | Age: 82
End: 2017-10-25
Attending: INTERNAL MEDICINE
Payer: MEDICARE

## 2017-10-25 VITALS
BODY MASS INDEX: 20.86 KG/M2 | DIASTOLIC BLOOD PRESSURE: 52 MMHG | RESPIRATION RATE: 12 BRPM | HEART RATE: 64 BPM | HEIGHT: 64 IN | WEIGHT: 122.2 LBS | SYSTOLIC BLOOD PRESSURE: 110 MMHG | OXYGEN SATURATION: 97 %

## 2017-10-25 DIAGNOSIS — I10 ESSENTIAL HYPERTENSION WITH GOAL BLOOD PRESSURE LESS THAN 130/80: Primary | ICD-10-CM

## 2017-10-25 PROCEDURE — 99211 OFF/OP EST MAY X REQ PHY/QHP: CPT

## 2017-10-25 PROCEDURE — 99213 OFFICE O/P EST LOW 20 MIN: CPT | Mod: ZP | Performed by: INTERNAL MEDICINE

## 2017-10-25 NOTE — NURSING NOTE
"Chief Complaint   Patient presents with     RECHECK     HTN FU       Initial /76 (BP Location: Right arm, Patient Position: Chair, Cuff Size: Adult Regular)  Pulse 64  Ht 5' 4\" (1.626 m)  Wt 122 lb 3.2 oz (55.4 kg)  SpO2 97%  BMI 20.98 kg/m2 Estimated body mass index is 20.98 kg/(m^2) as calculated from the following:    Height as of this encounter: 5' 4\" (1.626 m).    Weight as of this encounter: 122 lb 3.2 oz (55.4 kg).  Medication Reconciliation: complete    Face to Face time 5 minutes  Destiny Murrell CMA      "

## 2017-10-25 NOTE — MR AVS SNAPSHOT
"              After Visit Summary   10/25/2017    Lori Fall    MRN: [id number]           Patient Information     Date Of Birth          1929        Visit Information        Provider Department      10/25/2017 2:30 PM Alcides Guzman MD [facility]      Today's Diagnoses     Essential hypertension with goal blood pressure less than 130/80    -  1       Follow-ups after your visit        Who to contact     If you have questions or need follow up information about today's clinic visit or your schedule please contact [facility] directly at [phone].  Normal or non-critical lab and imaging results will be communicated to you by MyChart, letter or phone within 4 business days after the clinic has received the results. If you do not hear from us within 7 days, please contact the clinic through MyChart or phone. If you have a critical or abnormal lab result, we will notify you by phone as soon as possible.  Submit refill requests through [email or web address] or call your pharmacy and they will forward the refill request to us. Please allow 3 business days for your refill to be completed.          Additional Information About Your Visit        MyChart Information     [email or web address] lets you send messages to your doctor, view your test results, renew your prescriptions, schedule appointments and more. To sign up, go to [email or web address] . Click on \"Log in\" on the left side of the screen, which will take you to the Welcome page. Then click on \"Sign up Now\" on the right side of the page.     You will be asked to enter the access code listed below, as well as some personal information. Please follow the directions to create your username and password.     Your access code is: [email or web address]  Expires: 2018  1:17 PM     Your access code will  in 90 days. If you need help or a new code, please call your [place] clinic " "or [phone].        Care EveryWhere ID     This is your Care EveryWhere ID. This could be used by other organizations to access your [place] medical records  [id number]        Your Vitals Were     Pulse Respirations Height Pulse Oximetry BMI (Body Mass Index)       64 12 5' 4\" (1.626 m) 97% 20.98 kg/m2        Blood Pressure from Last 3 Encounters:   10/25/17 110/52   10/18/17 143/68   05/10/17 129/68    Weight from Last 3 Encounters:   10/25/17 122 lb 3.2 oz (55.4 kg)   10/18/17 121 lb (54.9 kg)   05/10/17 129 lb (58.5 kg)              Today, you had the following     No orders found for display       Primary Care Provider Office Phone # Fax #    Alcides Guzman [phone]       [facility] [address]  [place] [address]        Equal Access to Services     [facility] : Hadii aad ku hadasho Soomaali, waaxda luqadaha, qaybta kaalmada adeegyada, waxay idiin hayaan tristan marie . So [facility] [phone].    ATENCIÓN: Si habla español, tiene a williamson disposición servicios gratuitos de asistencia lingüística. Llame al [phone].    We comply with applicable federal civil rights laws and [place] laws. We do not discriminate on the basis of race, color, national origin, age, disability, sex, sexual orientation, or gender identity.            Thank you!     Thank you for choosing [facility] [place]  for your care. Our goal is always to provide you with excellent care. Hearing back from our patients is one way we can continue to improve our services. Please take a few minutes to complete the written survey that you may receive in the mail after your visit with us. Thank you!             Your Updated Medication List - Protect others around you: Learn how to safely use, store and throw away your medicines at www.disposemymeds.org.          This list is accurate as of: 10/25/17  2:44 PM.  Always use your most recent med list.                   Brand Name " Dispense Instructions for use Diagnosis    alendronate 35 MG tablet    FOSAMAX    13 tablet    Take 1 tablet (35 mg) by mouth once a week    Osteopenia       amLODIPine 10 MG tablet    NORVASC    90 tablet    Take 1 tablet (10 mg) by mouth daily    Essential hypertension with goal blood pressure less than 130/80       calcium + D 600-200 MG-UNIT Tabs   Generic drug:  calcium carbonate-vitamin D      Take 2 tablets by mouth 2 times daily.        * ezetimibe 10 MG tablet    ZETIA    90 tablet    Take 1 tablet (10 mg) by mouth At Bedtime    Hyperlipidemia LDL goal <100       * ezetimibe 10 MG tablet    ZETIA    90 tablet    TAKE 1 TABLET BY MOUTH AT BEDTIME    Hyperlipidemia LDL goal <100       fenofibrate 160 MG tablet     90 tablet    Take 1 tablet (160 mg) by mouth daily    Hyperlipidemia LDL goal <100       lisinopril 40 MG tablet    PRINIVIL/ZESTRIL    90 tablet    Take 1 tablet (40 mg) by mouth daily    Essential hypertension with goal blood pressure less than 130/80       metoprolol 100 MG tablet    LOPRESSOR    180 tablet    Take 1 tablet (100 mg) by mouth 2 times daily    Essential hypertension with goal blood pressure less than 130/80       omeprazole 40 MG capsule    priLOSEC    180 capsule    Take 1 capsule (40 mg) by mouth 2 times daily Take 30-60 minutes before a meal.    Gastrointestinal hemorrhage associated with gastric ulcer, PUD (peptic ulcer disease)       pantoprazole 40 MG EC tablet    PROTONIX    60 tablet    Take 1 tablet (40 mg) by mouth 2 times daily    Gastrointestinal hemorrhage associated with gastric ulcer       simvastatin 40 MG tablet    ZOCOR    90 tablet    Take 1 tablet (40 mg) by mouth At Bedtime    Hyperlipidemia LDL goal <100       * Notice:  This list has 2 medication(s) that are the same as other medications prescribed for you. Read the directions carefully, and ask your doctor or other care provider to review them with you.

## 2017-10-25 NOTE — PROGRESS NOTES
Lori Fall is a 88 year old female who is presenting at the current time to discuss her diagnosi(es) of htn.      Review Of Systems  Skin: negative  Eyes: negative  Ears/Nose/Throat: negative  Respiratory: No shortness of breath, dyspnea on exertion, cough, or hemoptysis  Cardiovascular: negative  Gastrointestinal: negative  Genitourinary: negative  Musculoskeletal: negative  Neurologic: negative  Psychiatric: negative  Hematologic/Lymphatic/Immunologic: negative  Endocrine: negative        PAST MEDICAL HISTORY:                  Past Medical History:   Diagnosis Date     Essential hypertension, benign     abstracted 7/5/02     Hyperlipidemia LDL goal < 100      Impaired fasting glucose      NONSPECIFIC MEDICAL HISTORY     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     Osteopenia      Primary thrombocytopenia 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     PUD (peptic ulcer disease) 1960s     Pure hypercholesterolemia     abstracted 7/5/02       PAST SURGICAL HISTORY:                  Past Surgical History:   Procedure Laterality Date     C NONSPECIFIC PROCEDURE  1995    splenectomy -- abstracted 7/5/02     C NONSPECIFIC PROCEDURE  1977    [facility]WIN  BSO appendectomy     C NONSPECIFIC PROCEDURE  1994    benign breast bopsy     C NONSPECIFIC PROCEDURE  6/28/2010    Left total hip arthroplasty, Bijal uncemented components     ESOPHAGOSCOPY, GASTROSCOPY, DUODENOSCOPY (EGD), COMBINED N/A 5/7/2017    Procedure: COMBINED ESOPHAGOSCOPY, GASTROSCOPY, DUODENOSCOPY (EGD), BIOPSY SINGLE OR MULTIPLE;  EGD;  Surgeon: Guero Olmedo MD;  Location:  GI     TONSILLECTOMY & ADENOIDECTOMY  1956       CURRENT MEDICATIONS:                  Current Outpatient Prescriptions   Medication Sig Dispense Refill     lisinopril (PRINIVIL/ZESTRIL) 40 MG tablet Take 1 tablet (40 mg) by mouth daily 90 tablet 3     ezetimibe (ZETIA) 10 MG tablet TAKE 1 TABLET BY MOUTH AT BEDTIME 90 tablet 3     omeprazole (PRILOSEC) 40 MG capsule Take 1  capsule (40 mg) by mouth 2 times daily Take 30-60 minutes before a meal. 180 capsule 3     ezetimibe (ZETIA) 10 MG tablet Take 1 tablet (10 mg) by mouth At Bedtime 90 tablet 3     pantoprazole (PROTONIX) 40 MG EC tablet Take 1 tablet (40 mg) by mouth 2 times daily 60 tablet 3     amLODIPine (NORVASC) 10 MG tablet Take 1 tablet (10 mg) by mouth daily 90 tablet 3     alendronate (FOSAMAX) 35 MG tablet Take 1 tablet (35 mg) by mouth once a week 13 tablet 3     fenofibrate 160 MG tablet Take 1 tablet (160 mg) by mouth daily 90 tablet 3     metoprolol (LOPRESSOR) 100 MG tablet Take 1 tablet (100 mg) by mouth 2 times daily 180 tablet 3     simvastatin (ZOCOR) 40 MG tablet Take 1 tablet (40 mg) by mouth At Bedtime 90 tablet 3     Calcium Carbonate-Vitamin D (CALCIUM + D) 600-200 MG-UNIT per tablet Take 2 tablets by mouth 2 times daily.         ALLERGIES:                  Allergies   Allergen Reactions     Aminoglycosides      neosporin onintment - rash     Hctz Hives     Penicillins      Sulfa Drugs        SOCIAL HISTORY:                  Social History     Social History     Marital status:      Spouse name: Nhan     Number of children: 1     Years of education: 12     Occupational History     retired      retired       Retired     Social History Main Topics     Smoking status: Never Smoker     Smokeless tobacco: Never Used     Alcohol use 0.0 oz/week     0 Standard drinks or equivalent per week      Comment: occasional wine     Drug use: No     Sexual activity: Yes     Partners: Male     Other Topics Concern     Not on file     Social History Narrative       FAMILY HISTORY:                   Family History   Problem Relation Age of Onset     HEART DISEASE Mother      d:age 66     HEART DISEASE Father      d:age 62     Family History Negative Brother      d:age 35  in war     CANCER Brother      d:age 53 cancer esophagus, alcoholic     HEART DISEASE Brother      b:1933 bypass surgery          Physical exam Reveals:    O/P: WNL  HEENT: WNL  NECK: No JVD, thyromegaly, or lymphadenopathy  HEART: RRR, no murmurs, gallops, or rubs  LUNGS: CTA bilaterally without rales, wheezes, or rhonchi  GI: NABS, nondistended, nontender, soft  EXT:without cyanosis, clubbing, or edema  NEURO: nonfocal  : no flank tenderness    A/P:    (I10) Essential hypertension with goal blood pressure less than 130/80  (primary encounter diagnosis)  [facility]  Plan:  No chnages

## 2017-11-20 ENCOUNTER — TRANSFERRED RECORDS (OUTPATIENT)
Dept: HEALTH INFORMATION MANAGEMENT | Facility: CLINIC | Age: 82
End: 2017-11-20

## 2017-12-05 DIAGNOSIS — I10 ESSENTIAL HYPERTENSION WITH GOAL BLOOD PRESSURE LESS THAN 130/80: ICD-10-CM

## 2017-12-05 RX ORDER — AMLODIPINE BESYLATE 10 MG/1
10 TABLET ORAL DAILY
Qty: 90 TABLET | Refills: 2 | Status: SHIPPED | OUTPATIENT
Start: 2017-12-05 | End: 2018-02-02

## 2017-12-05 NOTE — TELEPHONE ENCOUNTER
amLODIPine      Last Written Prescription Date: 12/14/16  Last Fill Quantity: 90, # refills: 3    Last Office Visit with [facility], [facility] or [facility] prescribing provider:  10/25/17   Future Office Visit:        BP Readings from Last 3 Encounters:   10/25/17 110/52   10/18/17 143/68   05/10/17 129/68     Refill done per RN refill protocol  Elijah Mancini RN, BSN

## 2017-12-27 DIAGNOSIS — E78.5 HYPERLIPIDEMIA LDL GOAL <100: ICD-10-CM

## 2017-12-27 RX ORDER — FENOFIBRATE 160 MG/1
160 TABLET ORAL DAILY
Qty: 90 TABLET | Refills: 2 | Status: SHIPPED | OUTPATIENT
Start: 2017-12-27 | End: 2018-02-02

## 2017-12-27 NOTE — TELEPHONE ENCOUNTER
Requested Prescriptions   Pending Prescriptions Disp Refills     fenofibrate 160 MG tablet 90 tablet 3     Sig: Take 1 tablet (160 mg) by mouth daily    Fibrates Failed    12/27/2017  9:33 AM       Failed - Lipid panel on file in past 12 months    Recent Labs   Lab Test  10/11/17   0815   11/11/15   0934   CHOL  142   < >  139   TRIG  69   < >  66   HDL  66   < >  50*   LDL  62   < >  76   VLDL   --    --   13   CHOLHDLRATIO   --    --   2.8    < > = values in this interval not displayed.              Passed - No abnormal creatine kinase in past 12 months    No lab results found.         Passed - Recent or future visit with authorizing provider    Patient had office visit in the last year or has a visit in the next 30 days with authorizing provider.  See chart review.              Passed - Patient is age 18 or older       Passed - No active pregnancy on record       Passed - No positive pregnancy test in past 12 months        Cholesterol   Date Value Ref Range Status   10/11/2017 142 <200 mg/dL Final   12/07/2016 132 <200 mg/dL Final     HDL Cholesterol   Date Value Ref Range Status   10/11/2017 66 >49 mg/dL Final   12/07/2016 47 (L) >49 mg/dL Final     LDL Cholesterol Calculated   Date Value Ref Range Status   10/11/2017 62 <100 mg/dL Final     Comment:     Desirable:       <100 mg/dl   12/07/2016 70 <100 mg/dL Final     Comment:     Desirable:       <100 mg/dl     Triglycerides   Date Value Ref Range Status   10/11/2017 69 <150 mg/dL Final   12/07/2016 73 <150 mg/dL Final     Comment:     Fasting specimen         Last Written Prescription Date:  12/14/16  Last Fill Quantity: 90,  # refills: 3   Last Office Visit with [facility], [facility] or [facility] prescribing provider:  10/25/17   Future Office Visit:   None scheduled  Prescription approved per [facility] Refill Protocol.  Elijah Mancini, RN, BSN

## 2018-01-02 DIAGNOSIS — M85.80 OSTEOPENIA: ICD-10-CM

## 2018-01-02 NOTE — TELEPHONE ENCOUNTER
Requested Prescriptions   Pending Prescriptions Disp Refills     alendronate (FOSAMAX) 35 MG tablet 13 tablet 3     Sig: Take 1 tablet (35 mg) by mouth once a week    Bisphosphonates Failed    1/2/2018  9:08 AM       Failed - Dexa on file within past 2 years    Please review last Dexa result.          Passed - Recent or future visit with authorizing provider's specialty    Patient had office visit in the last year or has a visit in the next 30 days with authorizing provider.  See chart review.              Passed - Patient is age 18 or older       Passed - Normal Serum Creatinine on file within past 12 months    Recent Labs   Lab Test  10/11/17   0815   CR  0.79             UA to refill per protocol  Med and pharm loaded for covering provider  Elijah Mancini, RN, BSN

## 2018-01-09 RX ORDER — ALENDRONATE SODIUM 35 MG/1
35 TABLET ORAL WEEKLY
Qty: 13 TABLET | Refills: 3 | Status: SHIPPED | OUTPATIENT
Start: 2018-01-09 | End: 2018-02-02

## 2018-01-29 DIAGNOSIS — E78.5 HYPERLIPIDEMIA LDL GOAL <100: ICD-10-CM

## 2018-01-29 DIAGNOSIS — I10 ESSENTIAL HYPERTENSION WITH GOAL BLOOD PRESSURE LESS THAN 130/80: ICD-10-CM

## 2018-01-29 DIAGNOSIS — K27.9 PUD (PEPTIC ULCER DISEASE): ICD-10-CM

## 2018-01-29 DIAGNOSIS — N28.9 RENAL INSUFFICIENCY: ICD-10-CM

## 2018-01-29 LAB
ALBUMIN SERPL-MCNC: 3.6 G/DL (ref 3.4–5)
ALP SERPL-CCNC: 60 U/L (ref 40–150)
ALT SERPL W P-5'-P-CCNC: 19 U/L (ref 0–50)
ANION GAP SERPL CALCULATED.3IONS-SCNC: 6 MMOL/L (ref 3–14)
AST SERPL W P-5'-P-CCNC: 33 U/L (ref 0–45)
BASOPHILS # BLD AUTO: 0 10E9/L (ref 0–0.2)
BASOPHILS NFR BLD AUTO: 0.3 %
BILIRUB DIRECT SERPL-MCNC: 0.2 MG/DL (ref 0–0.2)
BILIRUB SERPL-MCNC: 0.6 MG/DL (ref 0.2–1.3)
BUN SERPL-MCNC: 17 MG/DL (ref 7–30)
CALCIUM SERPL-MCNC: 8.7 MG/DL (ref 8.5–10.1)
CHLORIDE SERPL-SCNC: 108 MMOL/L (ref 94–109)
CHOLEST SERPL-MCNC: 146 MG/DL
CO2 SERPL-SCNC: 27 MMOL/L (ref 20–32)
CREAT SERPL-MCNC: 0.72 MG/DL (ref 0.52–1.04)
DIFFERENTIAL METHOD BLD: ABNORMAL
EOSINOPHIL # BLD AUTO: 0 10E9/L (ref 0–0.7)
EOSINOPHIL NFR BLD AUTO: 0.5 %
ERYTHROCYTE [DISTWIDTH] IN BLOOD BY AUTOMATED COUNT: 17.8 % (ref 10–15)
GFR SERPL CREATININE-BSD FRML MDRD: 77 ML/MIN/1.7M2
GLUCOSE SERPL-MCNC: 91 MG/DL (ref 70–99)
HCT VFR BLD AUTO: 39 % (ref 35–47)
HDLC SERPL-MCNC: 71 MG/DL
HGB BLD-MCNC: 12.5 G/DL (ref 11.7–15.7)
LDLC SERPL CALC-MCNC: 64 MG/DL
LYMPHOCYTES # BLD AUTO: 2.2 10E9/L (ref 0.8–5.3)
LYMPHOCYTES NFR BLD AUTO: 29 %
MCH RBC QN AUTO: 28.7 PG (ref 26.5–33)
MCHC RBC AUTO-ENTMCNC: 32.1 G/DL (ref 31.5–36.5)
MCV RBC AUTO: 89 FL (ref 78–100)
MONOCYTES # BLD AUTO: 1 10E9/L (ref 0–1.3)
MONOCYTES NFR BLD AUTO: 12.8 %
NEUTROPHILS # BLD AUTO: 4.3 10E9/L (ref 1.6–8.3)
NEUTROPHILS NFR BLD AUTO: 57.4 %
NONHDLC SERPL-MCNC: 75 MG/DL
PLATELET # BLD AUTO: 366 10E9/L (ref 150–450)
POTASSIUM SERPL-SCNC: 4.3 MMOL/L (ref 3.4–5.3)
PROT SERPL-MCNC: 6.7 G/DL (ref 6.8–8.8)
RBC # BLD AUTO: 4.36 10E12/L (ref 3.8–5.2)
SODIUM SERPL-SCNC: 141 MMOL/L (ref 133–144)
T3FREE SERPL-MCNC: 2.9 PG/ML (ref 2.3–4.2)
T4 FREE SERPL-MCNC: 1.15 NG/DL (ref 0.76–1.46)
TRIGL SERPL-MCNC: 56 MG/DL
TSH SERPL DL<=0.005 MIU/L-ACNC: 1.46 MU/L (ref 0.4–4)
WBC # BLD AUTO: 7.5 10E9/L (ref 4–11)

## 2018-01-29 PROCEDURE — 36415 COLL VENOUS BLD VENIPUNCTURE: CPT | Performed by: INTERNAL MEDICINE

## 2018-01-29 PROCEDURE — 80048 BASIC METABOLIC PNL TOTAL CA: CPT | Performed by: INTERNAL MEDICINE

## 2018-01-29 PROCEDURE — 84481 FREE ASSAY (FT-3): CPT | Performed by: INTERNAL MEDICINE

## 2018-01-29 PROCEDURE — 80076 HEPATIC FUNCTION PANEL: CPT | Performed by: INTERNAL MEDICINE

## 2018-01-29 PROCEDURE — 80061 LIPID PANEL: CPT | Performed by: INTERNAL MEDICINE

## 2018-01-29 PROCEDURE — 84443 ASSAY THYROID STIM HORMONE: CPT | Performed by: INTERNAL MEDICINE

## 2018-01-29 PROCEDURE — 85025 COMPLETE CBC W/AUTO DIFF WBC: CPT | Performed by: INTERNAL MEDICINE

## 2018-01-29 PROCEDURE — 84439 ASSAY OF FREE THYROXINE: CPT | Performed by: INTERNAL MEDICINE

## 2018-02-02 ENCOUNTER — OFFICE VISIT (OUTPATIENT)
Dept: OTHER | Facility: CLINIC | Age: 83
End: 2018-02-02
Attending: INTERNAL MEDICINE
Payer: MEDICARE

## 2018-02-02 VITALS
RESPIRATION RATE: 12 BRPM | WEIGHT: 122 LBS | DIASTOLIC BLOOD PRESSURE: 72 MMHG | HEART RATE: 65 BPM | OXYGEN SATURATION: 97 % | BODY MASS INDEX: 20.94 KG/M2 | SYSTOLIC BLOOD PRESSURE: 128 MMHG

## 2018-02-02 DIAGNOSIS — I10 ESSENTIAL HYPERTENSION WITH GOAL BLOOD PRESSURE LESS THAN 130/80: ICD-10-CM

## 2018-02-02 DIAGNOSIS — Z00.00 MEDICARE ANNUAL WELLNESS VISIT, SUBSEQUENT: Primary | ICD-10-CM

## 2018-02-02 DIAGNOSIS — E78.5 HYPERLIPIDEMIA LDL GOAL <100: ICD-10-CM

## 2018-02-02 DIAGNOSIS — N28.9 RENAL INSUFFICIENCY: ICD-10-CM

## 2018-02-02 DIAGNOSIS — K25.4 GASTROINTESTINAL HEMORRHAGE ASSOCIATED WITH GASTRIC ULCER: ICD-10-CM

## 2018-02-02 DIAGNOSIS — M85.80 OSTEOPENIA, UNSPECIFIED LOCATION: ICD-10-CM

## 2018-02-02 PROCEDURE — G0463 HOSPITAL OUTPT CLINIC VISIT: HCPCS

## 2018-02-02 PROCEDURE — 99213 OFFICE O/P EST LOW 20 MIN: CPT | Mod: ZP | Performed by: INTERNAL MEDICINE

## 2018-02-02 PROCEDURE — G0439 PPPS, SUBSEQ VISIT: HCPCS | Mod: ZP | Performed by: INTERNAL MEDICINE

## 2018-02-02 RX ORDER — METOPROLOL TARTRATE 100 MG
100 TABLET ORAL 2 TIMES DAILY
Qty: 180 TABLET | Refills: 3 | Status: SHIPPED | OUTPATIENT
Start: 2018-02-02 | End: 2019-02-18

## 2018-02-02 RX ORDER — AMLODIPINE BESYLATE 10 MG/1
10 TABLET ORAL DAILY
Qty: 90 TABLET | Refills: 3 | Status: SHIPPED | OUTPATIENT
Start: 2018-02-02 | End: 2019-02-11

## 2018-02-02 RX ORDER — FENOFIBRATE 160 MG/1
160 TABLET ORAL DAILY
Qty: 90 TABLET | Refills: 3 | Status: SHIPPED | OUTPATIENT
Start: 2018-02-02 | End: 2019-03-01

## 2018-02-02 RX ORDER — OMEPRAZOLE 40 MG/1
40 CAPSULE, DELAYED RELEASE ORAL 2 TIMES DAILY
Qty: 180 CAPSULE | Refills: 3 | Status: SHIPPED | OUTPATIENT
Start: 2018-02-02 | End: 2019-03-01

## 2018-02-02 RX ORDER — EZETIMIBE 10 MG/1
10 TABLET ORAL AT BEDTIME
Qty: 90 TABLET | Refills: 3 | Status: SHIPPED | OUTPATIENT
Start: 2018-02-02 | End: 2019-02-18

## 2018-02-02 RX ORDER — ALENDRONATE SODIUM 35 MG/1
35 TABLET ORAL WEEKLY
Qty: 13 TABLET | Refills: 3 | Status: SHIPPED | OUTPATIENT
Start: 2018-02-02 | End: 2018-12-10

## 2018-02-02 RX ORDER — SIMVASTATIN 40 MG
40 TABLET ORAL AT BEDTIME
Qty: 90 TABLET | Refills: 3 | Status: SHIPPED | OUTPATIENT
Start: 2018-02-02 | End: 2019-01-28

## 2018-02-02 RX ORDER — LISINOPRIL 40 MG/1
40 TABLET ORAL DAILY
Qty: 90 TABLET | Refills: 3 | Status: SHIPPED | OUTPATIENT
Start: 2018-02-02 | End: 2019-01-28

## 2018-02-02 NOTE — MR AVS SNAPSHOT
"              After Visit Summary   2/2/2018    Lori Fall    MRN: [id number]           Patient Information     Date Of Birth          6/22/1929        Visit Information        Provider Department      2/2/2018 10:30 AM Alcides Guzman MD [facility]      Today's Diagnoses     Medicare annual wellness visit, subsequent    -  1    Renal insufficiency        Hyperlipidemia LDL goal <100        Essential hypertension with goal blood pressure less than 130/80        Osteopenia, unspecified location        Gastrointestinal hemorrhage associated with gastric ulcer           Follow-ups after your visit        Additional Services     Follow-Up with Vascular Medicine                 Who to contact     If you have questions or need follow up information about today's clinic visit or your schedule please contact [facility] directly at [phone].  Normal or non-critical lab and imaging results will be communicated to you by MyChart, letter or phone within 4 business days after the clinic has received the results. If you do not hear from us within 7 days, please contact the clinic through MyChart or phone. If you have a critical or abnormal lab result, we will notify you by phone as soon as possible.  Submit refill requests through [employer] or call your pharmacy and they will forward the refill request to us. Please allow 3 business days for your refill to be completed.          Additional Information About Your Visit        MyChart Information     [employer] lets you send messages to your doctor, view your test results, renew your prescriptions, schedule appointments and more. To sign up, go to [email or web address] . Click on \"Log in\" on the left side of the screen, which will take you to the Welcome page. Then click on \"Sign up Now\" on the right side of the page.     You will be asked to enter the access code listed " below, as well as some personal information. Please follow the directions to create your username and password.     Your access code is: [email or web address]  Expires: 2018  9:52 AM     Your access code will  in 90 days. If you need help or a new code, please call your [facility] clinic or [phone].        Care EveryWhere ID     This is your Care EveryWhere ID. This could be used by other organizations to access your [facility] medical records  [id number]        Your Vitals Were     Pulse Respirations Pulse Oximetry Breastfeeding? BMI (Body Mass Index)       65 12 97% No 20.94 kg/m2        Blood Pressure from Last 3 Encounters:   18 128/72   10/25/17 110/52   10/18/17 143/68    Weight from Last 3 Encounters:   18 122 lb (55.3 kg)   10/25/17 122 lb 3.2 oz (55.4 kg)   10/18/17 121 lb (54.9 kg)              We Performed the Following     Follow-Up with Vascular Medicine     OFFICE/OUTPT VISIT,EST,LEVL III          Today's Medication Changes          These changes are accurate as of 18 11:59 PM.  If you have any questions, ask your nurse or doctor.               These medicines have changed or have updated prescriptions.        Dose/Directions    * ezetimibe 10 MG tablet   Commonly known as:  ZETIA   This may have changed:  Another medication with the same name was changed. Make sure you understand how and when to take each.   Used for:  Hyperlipidemia LDL goal <100   Changed by:  Alcides Guzman MD        Dose:  10 mg   Take 1 tablet (10 mg) by mouth At Bedtime   Quantity:  90 tablet   Refills:  3       * ezetimibe 10 MG tablet   Commonly known as:  ZETIA   This may have changed:  See the new instructions.   Used for:  Hyperlipidemia LDL goal <100   Changed by:  Alcides Guzman MD        Dose:  10 mg   Take 1 tablet (10 mg) by mouth At Bedtime   Quantity:  90 tablet   Refills:  3       * Notice:  This list has 2 medication(s) that are the same as other medications prescribed for  you. Read the directions carefully, and ask your doctor or other care provider to review them with you.         Where to get your medicines      These medications were sent to [id number] - [facility] [address] AT [facility] [address], [facility] [address]     Phone:  [phone]     alendronate 35 MG tablet    amLODIPine 10 MG tablet    ezetimibe 10 MG tablet    fenofibrate 160 MG tablet    lisinopril 40 MG tablet    metoprolol tartrate 100 MG tablet    omeprazole 40 MG capsule    simvastatin 40 MG tablet                Primary Care Provider Office Phone # Fax #    Alcides Guzman [phone]       [facility] [address]  [place] [address]        Equal Access to Services     ARTHUR MORGAN : Hadii vanessa patino hadasho Soomaali, waaxda luqadaha, qaybta kaalmada adeegyada, alyson ma. So [facility] [phone].    ATENCIÓN: Si habla español, tiene a williamson disposición servicios gratuitos de asistencia lingüística. [facility] [phone].    We comply with applicable federal civil rights laws and [place] laws. We do not discriminate on the basis of race, color, national origin, age, disability, sex, sexual orientation, or gender identity.            Thank you!     Thank you for choosing [facility] [place]  for your care. Our goal is always to provide you with excellent care. Hearing back from our patients is one way we can continue to improve our services. Please take a few minutes to complete the written survey that you may receive in the mail after your visit with us. Thank you!             Your Updated Medication List - Protect others around you: Learn how to safely use, store and throw away your medicines at www.disposemymeds.org.          This list is accurate as of 2/2/18 11:59 PM.  Always use your most recent med list.                   Brand Name Dispense Instructions for use Diagnosis     alendronate 35 MG tablet    FOSAMAX    13 tablet    Take 1 tablet (35 mg) by mouth once a week    Osteopenia, unspecified location       amLODIPine 10 MG tablet    NORVASC    90 tablet    Take 1 tablet (10 mg) by mouth daily    Essential hypertension with goal blood pressure less than 130/80       calcium + D 600-200 MG-UNIT Tabs   Generic drug:  calcium carbonate-vitamin D      Take 2 tablets by mouth 2 times daily.        * ezetimibe 10 MG tablet    ZETIA    90 tablet    Take 1 tablet (10 mg) by mouth At Bedtime    Hyperlipidemia LDL goal <100       * ezetimibe 10 MG tablet    ZETIA    90 tablet    Take 1 tablet (10 mg) by mouth At Bedtime    Hyperlipidemia LDL goal <100       fenofibrate 160 MG tablet     90 tablet    Take 1 tablet (160 mg) by mouth daily    Hyperlipidemia LDL goal <100       lisinopril 40 MG tablet    PRINIVIL/ZESTRIL    90 tablet    Take 1 tablet (40 mg) by mouth daily    Essential hypertension with goal blood pressure less than 130/80       metoprolol tartrate 100 MG tablet    LOPRESSOR    180 tablet    Take 1 tablet (100 mg) by mouth 2 times daily    Essential hypertension with goal blood pressure less than 130/80       omeprazole 40 MG capsule    priLOSEC    180 capsule    Take 1 capsule (40 mg) by mouth 2 times daily Take 30-60 minutes before a meal.    Gastrointestinal hemorrhage associated with gastric ulcer       pantoprazole 40 MG EC tablet    PROTONIX    60 tablet    Take 1 tablet (40 mg) by mouth 2 times daily    Gastrointestinal hemorrhage associated with gastric ulcer       simvastatin 40 MG tablet    ZOCOR    90 tablet    Take 1 tablet (40 mg) by mouth At Bedtime    Hyperlipidemia LDL goal <100       * Notice:  This list has 2 medication(s) that are the same as other medications prescribed for you. Read the directions carefully, and ask your doctor or other care provider to review them with you.

## 2018-02-02 NOTE — PROGRESS NOTES
Lori Fall is a 88 year old female who presents for:       Medicare annual wellness visit, subsequent  Renal insufficiency  Hyperlipidemia LDL goal <100  Essential hypertension with goal blood pressure less than 130/80  Osteopenia, unspecified location  Gastrointestinal hemorrhage associated with gastric ulcer     Current providers caring for this patient include:  Patient Care Team:  Alcides Guzman MD as PCP - General    Complete Medical and Social history reviewed with patient, outlined below.    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     PUD (peptic ulcer disease) 1960s     Pure hypercholesterolemia     abstracted 02       Past Surgical History:   Procedure Laterality Date     C NONSPECIFIC PROCEDURE      splenectomy -- abstracted 02     C NONSPECIFIC PROCEDURE      [facility]  BSO appendectomy     C NONSPECIFIC PROCEDURE      benign breast bopsy     C NONSPECIFIC PROCEDURE  2010    Left total hip arthroplasty, Bijal uncemented components     ESOPHAGOSCOPY, GASTROSCOPY, DUODENOSCOPY (EGD), COMBINED N/A 2017    Procedure: COMBINED ESOPHAGOSCOPY, GASTROSCOPY, DUODENOSCOPY (EGD), BIOPSY SINGLE OR MULTIPLE;  EGD;  Surgeon: Guero Olmedo MD;  Location:  GI     TONSILLECTOMY & ADENOIDECTOMY         Family History   Problem Relation Age of Onset     HEART DISEASE Mother      d:age 66     HEART DISEASE Father      d:age 62     Family History Negative Brother      d:age 35  in war     CANCER Brother      d:age 53 cancer esophagus, alcoholic     HEART  DISEASE Brother      b:1933 bypass surgery       Social History   Substance Use Topics     Smoking status: Never Smoker     Smokeless tobacco: Never Used     Alcohol use 0.0 oz/week     0 Standard drinks or equivalent per week      Comment: occasional wine       Diet: regular, low salt/low fat  Physical Activity: generally inactive  Depression Screen:    Over the past 2 weeks, patient has felt down, depressed, or hopeless:  No    Over the past 2 weeks, patient has felt little interest or pleasure in doing things: No    Functional ability/Safety screen:  Up and go test (able to get up and walk longer than 30 seconds): Passed  Patient needs assistance with: nothing  Patient's home has the following possible safety concerns: none identified  Patient has concerns about her hearing:  No  Cognitive Screen  Patient repeats three objects (ball, flag, tree)      Clock drawing test:   NORMAL  Recalls three objects after 3 minutes (ball,flag,tree):                                                                                               recalls 3 objects (3 points)    Review Of Systems  Skin: negative  Eyes: negative  Ears/Nose/Throat: negative  Respiratory: No shortness of breath, dyspnea on exertion, cough, or hemoptysis  Cardiovascular: negative  Gastrointestinal: negative  Genitourinary: negative  Musculoskeletal: negative  Neurologic: negative  Psychiatric: negative  Hematologic/Lymphatic/Immunologic: negative  Endocrine: negative     Physical Exam:  /72 (BP Location: Right arm, Patient Position: Chair, Cuff Size: Adult Large)  Pulse 65  Resp 12  Wt 122 lb (55.3 kg)  SpO2 97%  Breastfeeding? No  BMI 20.94 kg/m2   Body mass index is 20.94 kg/(m^2).              GENERAL APPEARANCE: healthy, alert and no distress  PSYCH: Affect normal/bright.  Mentation within normal limits.  EYES: conjunctiva clear  HENT: ear canals and TM's normal.  Nose and mouth without ulcers, erythema or lesions  NECK: supple,  nontender, no lymphadenopathy  RESP: lungs clear to auscultation - no rales, rhonchi or wheezes  CV: regular rates and rhythm, normal S1 S2, no murmur noted  ABDOMEN:  soft, nontender, no HSM or masses and bowel sounds normal  SKIN: no suspicious lesions or rashes  NEURO: Normal strength and tone,  normal speech and mentation  Extremities: no peripheral edema or tenderness, peripheral pulses normal  MS: extremities normal- no gross deformities noted, no erythema, FROM noted in all extremities  PSYCH: mentation appears normal  LYMPHATICS: normal ant/post cervical, supraclavicular, and axillary nodes    End of Life Planning:   Patient currently has an advanced directive: Yes.  Practitioner is supportive of decision. Full code    Education/Counseling:   Based on review of the above information, the following items were addressed:      Elevated blood pressure - follow-up plans made    Appropriate preventive services were discussed with this patient, including applicable screening as appropriate for cardiovascular disease, diabetes, osteopenia/osteoporosis, and glaucoma.  As appropriate for age/gender, discussed screening for colorectal cancer, prostate cancer, breast cancer, and cervical cancer.   Checklist reviewing preventive services available has been given to the patient.        (Z00.00) Medicare annual wellness visit, subsequent  (primary encounter diagnosis)  Comment: RTC one year for annual wellness visit  Plan: Follow-Up with Vascular Medicine, CBC with         platelets differential, T3 Free, T4 free, TSH,         Basic metabolic panel, Hepatic panel, Lipid         Profile            (N28.9) Renal insufficiency  Comment: CR normalized  Plan: Follow-Up with Vascular Medicine, Basic         metabolic panel            (E78.5) Hyperlipidemia LDL goal <100  Comment: at goal  Plan: fenofibrate 160 MG tablet, ezetimibe (ZETIA) 10        MG tablet, simvastatin (ZOCOR) 40 MG tablet,         Follow-Up with Vascular  Medicine, T3 Free, T4         free, TSH, Basic metabolic panel, Lipid Profile            (I10) Essential hypertension with goal blood pressure less than 130/80  Comment: at goal  Plan: amLODIPine (NORVASC) 10 MG tablet, lisinopril         (PRINIVIL/ZESTRIL) 40 MG tablet, metoprolol         tartrate (LOPRESSOR) 100 MG tablet, Follow-Up         with Vascular Medicine            (M85.80) Osteopenia, unspecified location  Comment: needs the below  Plan: alendronate (FOSAMAX) 35 MG tablet, Follow-Up         with Vascular Medicine           (K25.4) Gastrointestinal hemorrhage associated with gastric ulcer  Comment: CBC stable, continue PPI indefintiely  Plan: omeprazole (PRILOSEC) 40 MG capsule, Follow-Up         with Vascular Medicine, CBC with platelets         differential           Greater than one half the 15 minutes not spent on preventive care during this visit was spent providing aducation and counselling regarding item(s) # 2 onward as delineated above.

## 2018-02-02 NOTE — NURSING NOTE
"Chief Complaint   Patient presents with     RECHECK     3 month follow up, fasting labs done 1/29/18       Initial /72 (BP Location: Right arm, Patient Position: Chair, Cuff Size: Adult Large)  Pulse 65  Wt 122 lb (55.3 kg)  SpO2 97%  Breastfeeding? No  BMI 20.94 kg/m2 Estimated body mass index is 20.94 kg/(m^2) as calculated from the following:    Height as of 10/25/17: 5' 4\" (1.626 m).    Weight as of this encounter: 122 lb (55.3 kg).  Medication Reconciliation: complete     Cristina Brody MA   "

## 2018-12-10 DIAGNOSIS — M85.80 OSTEOPENIA, UNSPECIFIED LOCATION: ICD-10-CM

## 2018-12-10 RX ORDER — ALENDRONATE SODIUM 35 MG/1
35 TABLET ORAL WEEKLY
Qty: 13 TABLET | Refills: 3 | Status: SHIPPED | OUTPATIENT
Start: 2018-12-10 | End: 2019-03-01

## 2019-01-28 DIAGNOSIS — E78.5 HYPERLIPIDEMIA LDL GOAL <100: ICD-10-CM

## 2019-01-28 DIAGNOSIS — I10 ESSENTIAL HYPERTENSION WITH GOAL BLOOD PRESSURE LESS THAN 130/80: ICD-10-CM

## 2019-01-28 RX ORDER — SIMVASTATIN 40 MG
40 TABLET ORAL AT BEDTIME
Qty: 90 TABLET | Refills: 0 | Status: SHIPPED | OUTPATIENT
Start: 2019-01-28 | End: 2019-03-01

## 2019-01-28 RX ORDER — LISINOPRIL 40 MG/1
TABLET ORAL
Qty: 90 TABLET | Refills: 0 | Status: SHIPPED | OUTPATIENT
Start: 2019-01-28 | End: 2019-03-01

## 2019-01-28 NOTE — TELEPHONE ENCOUNTER
"Last Written Prescription Date:  2/2/18  Last Fill Quantity: 90,  # refills: 3   Last office visit: No previous visit found with prescribing provider:  2/2/18   Future Office Visit:   Next 5 appointments (look out 90 days)    Feb 13, 2019  1:40 PM CST  Return Visit with Alcides Guzman MD  [facility] ([facility]) [address]  [place] [address] [phone]         Requested Prescriptions   Pending Prescriptions Disp Refills     simvastatin (ZOCOR) 40 MG tablet 90 tablet 3     Sig: Take 1 tablet (40 mg) by mouth At Bedtime    Statins Protocol Passed - 1/28/2019  9:35 AM       Passed - LDL on file in past 12 months    Recent Labs   Lab Test 01/29/18  0853   LDL 64            Passed - No abnormal creatine kinase in past 12 months    No lab results found.            Passed - Recent (12 mo) or future (30 days) visit within the authorizing provider's specialty    Patient had office visit in the last 12 months or has a visit in the next 30 days with authorizing provider or within the authorizing provider's specialty.  See \"Patient Info\" tab in inbasket, or \"Choose Columns\" in Meds & Orders section of the refill encounter.             Passed - Medication is active on med list       Passed - Patient is age 18 or older       Passed - No active pregnancy on record       Passed - No positive pregnancy test in past 12 months        Prescription approved per [facility] Refill Protocol.  Elijah Mancini, RN, BSN    "

## 2019-01-28 NOTE — TELEPHONE ENCOUNTER
"Last Written Prescription Date:  2/2/18  Last Fill Quantity: 90,  # refills: 3   Last office visit: No previous visit found with prescribing provider:  2/2/18   Future Office Visit:   Next 5 appointments (look out 90 days)    Feb 13, 2019  1:40 PM CST  Return Visit with Alcides Guzman MD  [facility] ([facility]) [address]  [place] [id number] [phone]         Requested Prescriptions   Pending Prescriptions Disp Refills     lisinopril (PRINIVIL/ZESTRIL) 40 MG tablet [Pharmacy Med Name: LISINOPRIL 40MG TABLETS] 90 tablet 0     Sig: TAKE 1 TABLET BY MOUTH EVERY DAY    ACE Inhibitors (Including Combos) Protocol Passed - 1/28/2019  3:44 AM       Passed - Blood pressure under 140/90 in past 12 months    BP Readings from Last 3 Encounters:   02/02/18 128/72   10/25/17 110/52   10/18/17 143/68                Passed - Recent (12 mo) or future (30 days) visit within the authorizing provider's specialty    Patient had office visit in the last 12 months or has a visit in the next 30 days with authorizing provider or within the authorizing provider's specialty.  See \"Patient Info\" tab in inbasket, or \"Choose Columns\" in Meds & Orders section of the refill encounter.             Passed - Medication is active on med list       Passed - Patient is age 18 or older       Passed - No active pregnancy on record       Passed - Normal serum creatinine on file in past 12 months    Recent Labs   Lab Test 01/29/18  0853   CR 0.72            Passed - Normal serum potassium on file in past 12 months    Recent Labs   Lab Test 01/29/18  0853   POTASSIUM 4.3            Passed - No positive pregnancy test within past 12 months        Prescription approved per [facility] Refill Protocol.  Elijah Mancini, RN, BSN      "

## 2019-02-11 DIAGNOSIS — I10 ESSENTIAL HYPERTENSION WITH GOAL BLOOD PRESSURE LESS THAN 130/80: ICD-10-CM

## 2019-02-11 RX ORDER — AMLODIPINE BESYLATE 10 MG/1
10 TABLET ORAL DAILY
Qty: 90 TABLET | Refills: 0 | Status: SHIPPED | OUTPATIENT
Start: 2019-02-11 | End: 2019-03-01

## 2019-02-18 DIAGNOSIS — N28.9 RENAL INSUFFICIENCY: ICD-10-CM

## 2019-02-18 DIAGNOSIS — E78.5 HYPERLIPIDEMIA LDL GOAL <100: ICD-10-CM

## 2019-02-18 DIAGNOSIS — Z00.00 MEDICARE ANNUAL WELLNESS VISIT, SUBSEQUENT: ICD-10-CM

## 2019-02-18 DIAGNOSIS — I10 ESSENTIAL HYPERTENSION WITH GOAL BLOOD PRESSURE LESS THAN 130/80: ICD-10-CM

## 2019-02-18 DIAGNOSIS — K25.4 GASTROINTESTINAL HEMORRHAGE ASSOCIATED WITH GASTRIC ULCER: ICD-10-CM

## 2019-02-18 LAB
ALBUMIN SERPL-MCNC: 3.2 G/DL (ref 3.4–5)
ALP SERPL-CCNC: 57 U/L (ref 40–150)
ALT SERPL W P-5'-P-CCNC: 19 U/L (ref 0–50)
ANION GAP SERPL CALCULATED.3IONS-SCNC: 3 MMOL/L (ref 3–14)
AST SERPL W P-5'-P-CCNC: 25 U/L (ref 0–45)
BASOPHILS # BLD AUTO: 0 10E9/L (ref 0–0.2)
BASOPHILS NFR BLD AUTO: 0.2 %
BILIRUB DIRECT SERPL-MCNC: 0.2 MG/DL (ref 0–0.2)
BILIRUB SERPL-MCNC: 0.7 MG/DL (ref 0.2–1.3)
BUN SERPL-MCNC: 20 MG/DL (ref 7–30)
CALCIUM SERPL-MCNC: 9.3 MG/DL (ref 8.5–10.1)
CHLORIDE SERPL-SCNC: 101 MMOL/L (ref 94–109)
CHOLEST SERPL-MCNC: 128 MG/DL
CO2 SERPL-SCNC: 26 MMOL/L (ref 20–32)
CREAT SERPL-MCNC: 0.78 MG/DL (ref 0.52–1.04)
DIFFERENTIAL METHOD BLD: ABNORMAL
EOSINOPHIL # BLD AUTO: 0 10E9/L (ref 0–0.7)
EOSINOPHIL NFR BLD AUTO: 0.1 %
ERYTHROCYTE [DISTWIDTH] IN BLOOD BY AUTOMATED COUNT: 15.3 % (ref 10–15)
GFR SERPL CREATININE-BSD FRML MDRD: 68 ML/MIN/{1.73_M2}
GLUCOSE SERPL-MCNC: 110 MG/DL (ref 70–99)
HCT VFR BLD AUTO: 38 % (ref 35–47)
HDLC SERPL-MCNC: 41 MG/DL
HGB BLD-MCNC: 12.6 G/DL (ref 11.7–15.7)
LDLC SERPL CALC-MCNC: 75 MG/DL
LYMPHOCYTES # BLD AUTO: 1.3 10E9/L (ref 0.8–5.3)
LYMPHOCYTES NFR BLD AUTO: 11.4 %
MCH RBC QN AUTO: 29.5 PG (ref 26.5–33)
MCHC RBC AUTO-ENTMCNC: 33.2 G/DL (ref 31.5–36.5)
MCV RBC AUTO: 89 FL (ref 78–100)
MONOCYTES # BLD AUTO: 1.6 10E9/L (ref 0–1.3)
MONOCYTES NFR BLD AUTO: 13.9 %
NEUTROPHILS # BLD AUTO: 8.7 10E9/L (ref 1.6–8.3)
NEUTROPHILS NFR BLD AUTO: 74.4 %
NONHDLC SERPL-MCNC: 87 MG/DL
PLATELET # BLD AUTO: 438 10E9/L (ref 150–450)
POTASSIUM SERPL-SCNC: 4 MMOL/L (ref 3.4–5.3)
PROT SERPL-MCNC: 7 G/DL (ref 6.8–8.8)
RBC # BLD AUTO: 4.27 10E12/L (ref 3.8–5.2)
SODIUM SERPL-SCNC: 130 MMOL/L (ref 133–144)
T3FREE SERPL-MCNC: 2.1 PG/ML (ref 2.3–4.2)
T4 FREE SERPL-MCNC: 1.29 NG/DL (ref 0.76–1.46)
TRIGL SERPL-MCNC: 62 MG/DL
TSH SERPL DL<=0.005 MIU/L-ACNC: 0.96 MU/L (ref 0.4–4)
WBC # BLD AUTO: 11.7 10E9/L (ref 4–11)

## 2019-02-18 PROCEDURE — 36415 COLL VENOUS BLD VENIPUNCTURE: CPT | Performed by: INTERNAL MEDICINE

## 2019-02-18 PROCEDURE — 84439 ASSAY OF FREE THYROXINE: CPT | Performed by: INTERNAL MEDICINE

## 2019-02-18 PROCEDURE — 80076 HEPATIC FUNCTION PANEL: CPT | Performed by: INTERNAL MEDICINE

## 2019-02-18 PROCEDURE — 84481 FREE ASSAY (FT-3): CPT | Performed by: INTERNAL MEDICINE

## 2019-02-18 PROCEDURE — 80061 LIPID PANEL: CPT | Performed by: INTERNAL MEDICINE

## 2019-02-18 PROCEDURE — 80048 BASIC METABOLIC PNL TOTAL CA: CPT | Performed by: INTERNAL MEDICINE

## 2019-02-18 PROCEDURE — 85025 COMPLETE CBC W/AUTO DIFF WBC: CPT | Performed by: INTERNAL MEDICINE

## 2019-02-18 PROCEDURE — 84443 ASSAY THYROID STIM HORMONE: CPT | Performed by: INTERNAL MEDICINE

## 2019-02-18 RX ORDER — METOPROLOL TARTRATE 100 MG
TABLET ORAL
Qty: 180 TABLET | Refills: 0 | Status: SHIPPED | OUTPATIENT
Start: 2019-02-18 | End: 2019-03-01

## 2019-02-18 RX ORDER — EZETIMIBE 10 MG/1
TABLET ORAL
Qty: 90 TABLET | Refills: 0 | Status: SHIPPED | OUTPATIENT
Start: 2019-02-18 | End: 2019-03-01

## 2019-03-01 ENCOUNTER — HOSPITAL ENCOUNTER (OUTPATIENT)
Dept: LAB | Facility: CLINIC | Age: 84
Discharge: HOME | End: 2019-03-01
Attending: INTERNAL MEDICINE | Admitting: INTERNAL MEDICINE
Payer: MEDICARE

## 2019-03-01 ENCOUNTER — OFFICE VISIT (OUTPATIENT)
Dept: OTHER | Facility: CLINIC | Age: 84
End: 2019-03-01
Attending: INTERNAL MEDICINE
Payer: MEDICARE

## 2019-03-01 VITALS
HEART RATE: 64 BPM | OXYGEN SATURATION: 97 % | WEIGHT: 121 LBS | SYSTOLIC BLOOD PRESSURE: 126 MMHG | HEIGHT: 65 IN | DIASTOLIC BLOOD PRESSURE: 72 MMHG | RESPIRATION RATE: 12 BRPM | BODY MASS INDEX: 20.16 KG/M2

## 2019-03-01 DIAGNOSIS — R94.6 BORDERLINE ABNORMAL TFTS: ICD-10-CM

## 2019-03-01 DIAGNOSIS — E78.5 HYPERLIPIDEMIA LDL GOAL <100: ICD-10-CM

## 2019-03-01 DIAGNOSIS — Z00.00 MEDICARE ANNUAL WELLNESS VISIT, SUBSEQUENT: Primary | ICD-10-CM

## 2019-03-01 DIAGNOSIS — I10 ESSENTIAL HYPERTENSION WITH GOAL BLOOD PRESSURE LESS THAN 130/80: ICD-10-CM

## 2019-03-01 DIAGNOSIS — M85.80 OSTEOPENIA, UNSPECIFIED LOCATION: ICD-10-CM

## 2019-03-01 DIAGNOSIS — K25.4 GASTROINTESTINAL HEMORRHAGE ASSOCIATED WITH GASTRIC ULCER: ICD-10-CM

## 2019-03-01 LAB
T3FREE SERPL-MCNC: 2.6 PG/ML (ref 2.3–4.2)
T4 FREE SERPL-MCNC: 1.12 NG/DL (ref 0.76–1.46)
TSH SERPL DL<=0.005 MIU/L-ACNC: 1.44 MU/L (ref 0.4–4)

## 2019-03-01 PROCEDURE — 36415 COLL VENOUS BLD VENIPUNCTURE: CPT | Performed by: INTERNAL MEDICINE

## 2019-03-01 PROCEDURE — 84445 ASSAY OF TSI GLOBULIN: CPT | Performed by: INTERNAL MEDICINE

## 2019-03-01 PROCEDURE — G0439 PPPS, SUBSEQ VISIT: HCPCS | Mod: ZP | Performed by: INTERNAL MEDICINE

## 2019-03-01 PROCEDURE — 84481 FREE ASSAY (FT-3): CPT | Performed by: INTERNAL MEDICINE

## 2019-03-01 PROCEDURE — 86376 MICROSOMAL ANTIBODY EACH: CPT | Performed by: INTERNAL MEDICINE

## 2019-03-01 PROCEDURE — 99213 OFFICE O/P EST LOW 20 MIN: CPT | Mod: 25 | Performed by: INTERNAL MEDICINE

## 2019-03-01 PROCEDURE — 86800 THYROGLOBULIN ANTIBODY: CPT | Performed by: INTERNAL MEDICINE

## 2019-03-01 PROCEDURE — 84443 ASSAY THYROID STIM HORMONE: CPT | Performed by: INTERNAL MEDICINE

## 2019-03-01 PROCEDURE — G0463 HOSPITAL OUTPT CLINIC VISIT: HCPCS

## 2019-03-01 PROCEDURE — 84439 ASSAY OF FREE THYROXINE: CPT | Performed by: INTERNAL MEDICINE

## 2019-03-01 RX ORDER — METOPROLOL TARTRATE 100 MG
100 TABLET ORAL 2 TIMES DAILY
Qty: 180 TABLET | Refills: 3 | Status: SHIPPED | OUTPATIENT
Start: 2019-03-01 | End: 2020-05-13

## 2019-03-01 RX ORDER — LISINOPRIL 40 MG/1
40 TABLET ORAL DAILY
Qty: 90 TABLET | Refills: 3 | Status: SHIPPED | OUTPATIENT
Start: 2019-03-01 | End: 2020-03-31

## 2019-03-01 RX ORDER — EZETIMIBE 10 MG/1
10 TABLET ORAL AT BEDTIME
Qty: 90 TABLET | Refills: 3 | Status: SHIPPED | OUTPATIENT
Start: 2019-03-01 | End: 2020-05-13

## 2019-03-01 RX ORDER — AMLODIPINE BESYLATE 10 MG/1
10 TABLET ORAL DAILY
Qty: 90 TABLET | Refills: 3 | Status: SHIPPED | OUTPATIENT
Start: 2019-03-01 | End: 2020-04-27

## 2019-03-01 RX ORDER — ALENDRONATE SODIUM 35 MG/1
35 TABLET ORAL WEEKLY
Qty: 13 TABLET | Refills: 3 | Status: SHIPPED | OUTPATIENT
Start: 2019-03-01 | End: 2019-11-19

## 2019-03-01 RX ORDER — FENOFIBRATE 160 MG/1
160 TABLET ORAL DAILY
Qty: 90 TABLET | Refills: 3 | Status: SHIPPED | OUTPATIENT
Start: 2019-03-01 | End: 2019-04-03

## 2019-03-01 RX ORDER — SIMVASTATIN 40 MG
40 TABLET ORAL AT BEDTIME
Qty: 90 TABLET | Refills: 3 | Status: SHIPPED | OUTPATIENT
Start: 2019-03-01 | End: 2020-03-18

## 2019-03-01 RX ORDER — OMEPRAZOLE 40 MG/1
40 CAPSULE, DELAYED RELEASE ORAL 2 TIMES DAILY
Qty: 180 CAPSULE | Refills: 3 | Status: SHIPPED | OUTPATIENT
Start: 2019-03-01 | End: 2021-04-09

## 2019-03-01 ASSESSMENT — MIFFLIN-ST. JEOR: SCORE: 974.73

## 2019-03-01 NOTE — PROGRESS NOTES
"Lori Fall is a 89 year old female who presents for:  Chief Complaint   Patient presents with     RECHECK     Yearly physical.  Labs done at [facility] on 02/18/19.        Vitals:    Vitals:    03/01/19 0943 03/01/19 0945   BP: 120/64 143/79   BP Location: Right arm Left arm   Patient Position: Chair Chair   Cuff Size: Adult Regular Adult Regular   Pulse: 64    Resp: 14    SpO2: 97%    Weight: 121 lb (54.9 kg)    Height: 5' 5\" (1.651 m)        BMI:  Estimated body mass index is 20.14 kg/m  as calculated from the following:    Height as of this encounter: 5' 5\" (1.651 m).    Weight as of this encounter: 121 lb (54.9 kg).    Pain Score:  Data Unavailable        Helena Jane    "

## 2019-03-01 NOTE — PROGRESS NOTES
Lori Fall is a 89 year old female who presents for        Medicare annual wellness visit, subsequent  Osteopenia, unspecified location  Essential hypertension with goal blood pressure less than 130/80  Hyperlipidemia LDL goal <100  Gastrointestinal hemorrhage associated with gastric ulcer  Borderline abnormal TFTs     Current providers caring for this patient include:  Patient Care Team:  Alcides Guzman MD as PCP - General    Complete Medical and Social history reviewed with patient, outlined below.    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     PUD (peptic ulcer disease) 1960s     Pure hypercholesterolemia     abstracted 02       Past Surgical History:   Procedure Laterality Date     C NONSPECIFIC PROCEDURE      splenectomy -- abstracted 02     C NONSPECIFIC PROCEDURE      [facility]  BSO appendectomy     C NONSPECIFIC PROCEDURE      benign breast bopsy     C NONSPECIFIC PROCEDURE  2010    Left total hip arthroplasty, Bijal uncemented components     ESOPHAGOSCOPY, GASTROSCOPY, DUODENOSCOPY (EGD), COMBINED N/A 2017    Procedure: COMBINED ESOPHAGOSCOPY, GASTROSCOPY, DUODENOSCOPY (EGD), BIOPSY SINGLE OR MULTIPLE;  EGD;  Surgeon: Guero Olmedo MD;  Location:  GI     TONSILLECTOMY & ADENOIDECTOMY         Family History   Problem Relation Age of Onset     Heart Disease Mother         d:age 66     Heart Disease Father         d:age 62     Family History Negative Brother         d:age 35  in war     Cancer Brother         d:age 53 cancer esophagus,  "alcoholic     Heart Disease Brother         b:1933 bypass surgery       Social History     Tobacco Use     Smoking status: Never Smoker     Smokeless tobacco: Never Used   Substance Use Topics     Alcohol use: Yes     Alcohol/week: 0.0 oz     Comment: occasional wine       Diet: regular, low salt/low fat  Physical Activity: active without specific exercise program  Depression Screen:    Over the past 2 weeks, patient has felt down, depressed, or hopeless:  No    Over the past 2 weeks, patient has felt little interest or pleasure in doing things: No    Functional ability/Safety screen:  Up and go test (able to get up and walk longer than 30 seconds): Passed  Patient needs assistance with: nothing  Patient's home has the following possible safety concerns: none identified  Patient has concerns about her hearing:  Yes  Cognitive Screen  Patient repeats three objects (ball, flag, tree)      Clock drawing test:  NORMAL  Recalls three objects after 3 minutes (ball,flag,tree):                                                                                               recalls 3 objects (3 points)    Review Of Systems  Skin: negative  Eyes: negative  Ears/Nose/Throat: negative  Respiratory: No shortness of breath, dyspnea on exertion, cough, or hemoptysis  Cardiovascular: negative  Gastrointestinal: negative  Genitourinary: negative  Musculoskeletal: negative  Neurologic: negative  Psychiatric: negative  Hematologic/Lymphatic/Immunologic: negative  Endocrine: negative     Physical Exam:  /75 (BP Location: Left arm, Patient Position: Chair, Cuff Size: Adult Regular)   Pulse 64   Resp 12   Ht 5' 5\" (1.651 m)   Wt 121 lb (54.9 kg)   SpO2 97%   Breastfeeding? No   BMI 20.14 kg/m     Body mass index is 20.14 kg/m .          GENERAL APPEARANCE: healthy, alert and no distress  PSYCH: Affect normal/bright.  Mentation within normal limits.  EYES: conjunctiva clear  HENT: ear canals and TM's normal.  Nose and mouth " without ulcers, erythema or lesions  NECK: supple, nontender, no lymphadenopathy  RESP: lungs clear to auscultation - no rales, rhonchi or wheezes  CV: regular rates and rhythm, normal S1 S2, no murmur noted  ABDOMEN:  soft, nontender, no HSM or masses and bowel sounds normal  SKIN: no suspicious lesions or rashes  NEURO: Normal strength and tone,  normal speech and mentation  Extremities: no peripheral edema or tenderness, peripheral pulses normal  MS: extremities normal- no gross deformities noted, no erythema, FROM noted in all extremities  PSYCH: mentation appears normal  LYMPHATICS: no cervical adenopathy               Component      Latest Ref Rng & Units 1/29/2018 2/18/2019   WBC      4.0 - 11.0 10e9/L 7.5 11.7 (H)   RBC Count      3.8 - 5.2 10e12/L 4.36 4.27   Hemoglobin      11.7 - 15.7 g/dL 12.5 12.6   Hematocrit      35.0 - 47.0 % 39.0 38.0   MCV      78 - 100 fl 89 89   MCH      26.5 - 33.0 pg 28.7 29.5   MCHC      31.5 - 36.5 g/dL 32.1 33.2   RDW      10.0 - 15.0 % 17.8 (H) 15.3 (H)   Platelet Count      150 - 450 10e9/L 366 438   Diff Method       Automated Method Automated Method   % Neutrophils      % 57.4 74.4   % Lymphocytes      % 29.0 11.4   % Monocytes      % 12.8 13.9   % Eosinophils      % 0.5 0.1   % Basophils      % 0.3 0.2   Absolute Neutrophil      1.6 - 8.3 10e9/L 4.3 8.7 (H)   Absolute Lymphocytes      0.8 - 5.3 10e9/L 2.2 1.3   Absolute Monocytes      0.0 - 1.3 10e9/L 1.0 1.6 (H)   Absolute Eosinophils      0.0 - 0.7 10e9/L 0.0 0.0   Absolute Basophils      0.0 - 0.2 10e9/L 0.0 0.0   Sodium      133 - 144 mmol/L 141 130 (L)   Potassium      3.4 - 5.3 mmol/L 4.3 4.0   Chloride      94 - 109 mmol/L 108 101   Carbon Dioxide      20 - 32 mmol/L 27 26   Anion Gap      3 - 14 mmol/L 6 3   Glucose      70 - 99 mg/dL 91 110 (H)   Urea Nitrogen      7 - 30 mg/dL 17 20   Creatinine      0.52 - 1.04 mg/dL 0.72 0.78   GFR Estimate      >60 mL/min/1.73:m2 77 68   GFR Estimate If Black      >60  mL/min/1.73:m2 >90 78   Calcium      8.5 - 10.1 mg/dL 8.7 9.3   Bilirubin Direct      0.0 - 0.2 mg/dL 0.2 0.2   Bilirubin Total      0.2 - 1.3 mg/dL 0.6 0.7   Albumin      3.4 - 5.0 g/dL 3.6 3.2 (L)   Protein Total      6.8 - 8.8 g/dL 6.7 (L) 7.0   Alkaline Phosphatase      40 - 150 U/L 60 57   ALT      0 - 50 U/L 19 19   AST      0 - 45 U/L 33 25   Cholesterol      <200 mg/dL 146 128   Triglycerides      <150 mg/dL 56 62   HDL Cholesterol      >49 mg/dL 71 41 (L)   LDL Cholesterol Calculated      <100 mg/dL 64 75   Non HDL Cholesterol      <130 mg/dL 75 87   Free T3      2.3 - 4.2 pg/mL 2.9 2.1 (L)   T4 Free      0.76 - 1.46 ng/dL 1.15 1.29   TSH      0.40 - 4.00 mU/L 1.46 0.96         End of Life Planning:   Patient currently has an advanced directive: Yes.  Practitioner is supportive of decision.    Education/Counseling:   Based on review of the above information, the following items were addressed:      TFT abnls    Appropriate preventive services were discussed with this patient, including applicable screening as appropriate for cardiovascular disease, diabetes, osteopenia/osteoporosis, and glaucoma.  As appropriate for age/gender, discussed screening for colorectal cancer, prostate cancer, breast cancer, and cervical cancer.   Checklist reviewing preventive services available has been given to the patient.      (Z00.00) Medicare annual wellness visit, subsequent  (primary encounter diagnosis)  Comment: doing well  Plan: RTC one year for annual physical    (M85.80) Osteopenia, unspecified location  Comment: doing well, needs the below  Plan: alendronate (FOSAMAX) 35 MG tablet,         OFFICE/OUTPT VISIT,EST,LEVL III           (I10) Essential hypertension with goal blood pressure less than 130/80  Comment: at goal  Plan: amLODIPine (NORVASC) 10 MG tablet, lisinopril         (PRINIVIL/ZESTRIL) 40 MG tablet, metoprolol         tartrate (LOPRESSOR) 100 MG tablet,         OFFICE/OUTPT VISIT,EST,LEVL III             (E78.5) Hyperlipidemia LDL goal <100  Comment: at goal  Plan: ezetimibe (ZETIA) 10 MG tablet, fenofibrate         (TRIGLIDE/LOFIBRA) 160 MG tablet, simvastatin         (ZOCOR) 40 MG tablet, OFFICE/OUTPT         VISIT,EST,LEVL III           (K25.4) Gastrointestinal hemorrhage associated with gastric ulcer  Comment: doing well, no recurrent GI bleeding, needs the below  Plan: omeprazole (PRILOSEC) 40 MG DR capsule,         OFFICE/OUTPT VISIT,EST,LEVL III           (R94.6) Borderline abnormal TFTs  Comment: check the below. If TFT abnls persistent then check imaging if Abs normal  Plan: T3 Free, T4 free, TSH, Thyroid peroxidase         antibody, Anti thyroglobulin antibody, Thyroid         stimulating immunoglobulin, OFFICE/OUTPT         VISIT,EST,LEVL III           Greater than one half the 15 minutes not spent on preventive care during this visit was spent providing aducation and counselling regarding item(s) # 2 onward as delineated above.

## 2019-03-04 LAB
THYROGLOB AB SERPL IA-ACNC: <20 IU/ML (ref 0–40)
THYROPEROXIDASE AB SERPL-ACNC: 12 IU/ML

## 2019-03-05 LAB — TSI SER-ACNC: <1 TSI INDEX

## 2019-04-03 ENCOUNTER — OFFICE VISIT (OUTPATIENT)
Dept: OTHER | Facility: CLINIC | Age: 84
End: 2019-04-03
Attending: INTERNAL MEDICINE
Payer: MEDICARE

## 2019-04-03 VITALS
SYSTOLIC BLOOD PRESSURE: 118 MMHG | WEIGHT: 124.4 LBS | DIASTOLIC BLOOD PRESSURE: 58 MMHG | HEART RATE: 59 BPM | OXYGEN SATURATION: 98 % | BODY MASS INDEX: 20.73 KG/M2 | HEIGHT: 65 IN | RESPIRATION RATE: 14 BRPM

## 2019-04-03 DIAGNOSIS — E78.5 HYPERLIPIDEMIA LDL GOAL <100: ICD-10-CM

## 2019-04-03 DIAGNOSIS — Z00.00 MEDICARE ANNUAL WELLNESS VISIT, SUBSEQUENT: ICD-10-CM

## 2019-04-03 DIAGNOSIS — R94.6 BORDERLINE ABNORMAL TFTS: Primary | ICD-10-CM

## 2019-04-03 PROCEDURE — 99214 OFFICE O/P EST MOD 30 MIN: CPT | Mod: ZP | Performed by: INTERNAL MEDICINE

## 2019-04-03 PROCEDURE — G0463 HOSPITAL OUTPT CLINIC VISIT: HCPCS

## 2019-04-03 RX ORDER — ALENDRONATE SODIUM 35 MG/1
35 TABLET ORAL WEEKLY
Qty: 13 TABLET | Refills: 3 | Status: CANCELLED | OUTPATIENT
Start: 2019-04-03

## 2019-04-03 RX ORDER — FENOFIBRATE 160 MG/1
160 TABLET ORAL DAILY
Qty: 90 TABLET | Refills: 3 | Status: SHIPPED | OUTPATIENT
Start: 2019-04-03 | End: 2020-03-17

## 2019-04-03 RX ORDER — FENOFIBRATE 160 MG/1
160 TABLET ORAL DAILY
Qty: 90 TABLET | Refills: 3 | Status: CANCELLED | OUTPATIENT
Start: 2019-04-03

## 2019-04-03 SDOH — HEALTH STABILITY: MENTAL HEALTH: HOW OFTEN DO YOU HAVE A DRINK CONTAINING ALCOHOL?: NEVER

## 2019-04-03 ASSESSMENT — MIFFLIN-ST. JEOR: SCORE: 990.15

## 2019-04-03 NOTE — PROGRESS NOTES
"Lori Fall is a 89 year old female who presents for:  Chief Complaint   Patient presents with     RECHECK     follow up-labs         Vitals:    Vitals:    04/03/19 0847 04/03/19 0848   BP: 121/66 151/78   BP Location: Right arm Left arm   Patient Position: Chair Chair   Cuff Size: Adult Regular Adult Regular   Pulse: 59    Resp: 14    SpO2: 98%    Weight: 124 lb 6.4 oz (56.4 kg)    Height: 5' 5\" (1.651 m)        BMI:  Estimated body mass index is 20.7 kg/m  as calculated from the following:    Height as of this encounter: 5' 5\" (1.651 m).    Weight as of this encounter: 124 lb 6.4 oz (56.4 kg).    Pain Score:  Data Unavailable        Helena Jane MA    "

## 2019-04-03 NOTE — PROGRESS NOTES
Lori Fall is a 89 year old female who is presenting at the current time to discuss her diagnosi(es) of     Borderline abnormal TFTs .      Review Of Systems  Skin: negative  Eyes: negative  Ears/Nose/Throat: negative  Respiratory: No shortness of breath, dyspnea on exertion, cough, or hemoptysis  Cardiovascular: negative  Gastrointestinal: negative  Genitourinary: negative  Musculoskeletal: negative  Neurologic: negative  Psychiatric: negative  Hematologic/Lymphatic/Immunologic: negative  Endocrine: negative       PAST MEDICAL HISTORY:                  Past Medical History:   Diagnosis Date     Essential hypertension, benign     abstracted 7/5/02     Hyperlipidemia LDL goal < 100      Impaired fasting glucose      NONSPECIFIC MEDICAL HISTORY     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     Osteopenia      Primary thrombocytopenia 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     PUD (peptic ulcer disease) 1960s     Pure hypercholesterolemia     abstracted 7/5/02       PAST SURGICAL HISTORY:                  Past Surgical History:   Procedure Laterality Date     C NONSPECIFIC PROCEDURE  1995    splenectomy -- abstracted 7/5/02     C NONSPECIFIC PROCEDURE  1977    [facility]  BSO appendectomy     C NONSPECIFIC PROCEDURE  1994    benign breast bopsy     C NONSPECIFIC PROCEDURE  6/28/2010    Left total hip arthroplasty, Bijal uncemented components     ESOPHAGOSCOPY, GASTROSCOPY, DUODENOSCOPY (EGD), COMBINED N/A 5/7/2017    Procedure: COMBINED ESOPHAGOSCOPY, GASTROSCOPY, DUODENOSCOPY (EGD), BIOPSY SINGLE OR MULTIPLE;  EGD;  Surgeon: Guero Olmedo MD;  Location:  GI     TONSILLECTOMY & ADENOIDECTOMY  1956       CURRENT MEDICATIONS:                  Current Outpatient Medications   Medication Sig Dispense Refill     alendronate (FOSAMAX) 35 MG tablet Take 1 tablet (35 mg) by mouth once a week 13 tablet 3     amLODIPine (NORVASC) 10 MG tablet Take 1 tablet (10 mg) by mouth daily 90 tablet 3     Calcium  Carbonate-Vitamin D (CALCIUM + D) 600-200 MG-UNIT per tablet Take 2 tablets by mouth 2 times daily.       ezetimibe (ZETIA) 10 MG tablet Take 1 tablet (10 mg) by mouth At Bedtime 90 tablet 3     ezetimibe (ZETIA) 10 MG tablet Take 1 tablet (10 mg) by mouth At Bedtime 90 tablet 3     fenofibrate (TRIGLIDE/LOFIBRA) 160 MG tablet Take 1 tablet (160 mg) by mouth daily 90 tablet 3     lisinopril (PRINIVIL/ZESTRIL) 40 MG tablet Take 1 tablet (40 mg) by mouth daily 90 tablet 3     metoprolol tartrate (LOPRESSOR) 100 MG tablet Take 1 tablet (100 mg) by mouth 2 times daily 180 tablet 3     omeprazole (PRILOSEC) 40 MG DR capsule Take 1 capsule (40 mg) by mouth 2 times daily Take 30-60 minutes before a meal. 180 capsule 3     simvastatin (ZOCOR) 40 MG tablet Take 1 tablet (40 mg) by mouth At Bedtime 90 tablet 3     pantoprazole (PROTONIX) 40 MG EC tablet Take 1 tablet (40 mg) by mouth 2 times daily (Patient not taking: Reported on 4/3/2019) 60 tablet 3       ALLERGIES:                  Allergies   Allergen Reactions     Aminoglycosides      neosporin onintment - rash     Hctz Hives     Penicillins      Sulfa Drugs        SOCIAL HISTORY:                  Social History     Socioeconomic History     Marital status:      Spouse name: Nhan     Number of children: 1     Years of education: 12     Highest education level: Not on file   Occupational History     Occupation: retired     Comment: retired      Employer: RETIRED   Social Needs     Financial resource strain: Not on file     Food insecurity:     Worry: Not on file     Inability: Not on file     Transportation needs:     Medical: Not on file     Non-medical: Not on file   Tobacco Use     Smoking status: Never Smoker     Smokeless tobacco: Never Used   Substance and Sexual Activity     Alcohol use: No     Alcohol/week: 0.0 oz     Frequency: Never     Comment: occasional wine     Drug use: No     Sexual activity: Yes     Partners: Male   Lifestyle      Physical activity:     Days per week: Not on file     Minutes per session: Not on file     Stress: Not on file   Relationships     Social connections:     Talks on phone: Not on file     Gets together: Not on file     Attends Confucianist service: Not on file     Active member of club or organization: Not on file     Attends meetings of clubs or organizations: Not on file     Relationship status: Not on file     Intimate partner violence:     Fear of current or ex partner: Not on file     Emotionally abused: Not on file     Physically abused: Not on file     Forced sexual activity: Not on file   Other Topics Concern     Parent/sibling w/ CABG, MI or angioplasty before 65F 55M? Not Asked   Social History Narrative     Not on file       FAMILY HISTORY:                   Family History   Problem Relation Age of Onset     Heart Disease Mother         d:age 66     Heart Disease Father         d:age 62     Family History Negative Brother         d:age 35  in war     Cancer Brother         d:age 53 cancer esophagus, alcoholic     Heart Disease Brother         b:1933 bypass surgery      Physical exam Reveals:    O/P: WNL  HEENT: WNL  NECK: No JVD, thyromegaly, or lymphadenopathy  HEART: RRR, no murmurs, gallops, or rubs  LUNGS: CTA bilaterally without rales, wheezes, or rhonchi  GI: NABS, nondistended, nontender, soft  EXT:without cyanosis, clubbing, or edema  NEURO: nonfocal  : no flank tenderness      Component      Latest Ref Rng & Units 2019 3/1/2019   TSH      0.40 - 4.00 mU/L 0.96 1.44   T4 Free      0.76 - 1.46 ng/dL 1.29 1.12   Free T3      2.3 - 4.2 pg/mL 2.1 (L) 2.6   Thyroid Stim Immunog      <=1.3 TSI index  <1.0   Thyroglobulin Antibody      <40 IU/mL  <20   Thyroid Peroxidase Antibody      <35 IU/mL  12       A/P:    (R94.6) Borderline abnormal TFTs  (primary encounter diagnosis)  Comment: please see my last note. TFTs now WNL. Abs WNL  Plan: No further f/u required, RTC in eleven months for annual  physical      (Z00.00) Medicare annual wellness visit, subsequent  Comment: She needs the below for her annual physical in one year  Plan: Follow-Up with Vascular Medicine, CBC with         platelets differential, T3 Free, T4 free, TSH,         Basic metabolic panel, Hepatic panel, Lipid         Profile

## 2019-11-18 DIAGNOSIS — M85.80 OSTEOPENIA, UNSPECIFIED LOCATION: ICD-10-CM

## 2019-11-18 NOTE — TELEPHONE ENCOUNTER
alendronate (FOSAMAX) 35 MG tablet  Last Written Prescription Date:  3/1/19  Last Fill Quantity: 13,  # refills: 3   Last office visit: 4/3/19    Unable to fill per [facility] protocol.  Medication and pharmacy loaded.    Judit Lambert RN BSN  [facility]

## 2019-11-19 RX ORDER — ALENDRONATE SODIUM 35 MG/1
35 TABLET ORAL WEEKLY
Qty: 13 TABLET | Refills: 3 | Status: SHIPPED | OUTPATIENT
Start: 2019-11-19 | End: 2020-04-29

## 2020-03-05 ENCOUNTER — OFFICE VISIT (OUTPATIENT)
Dept: URGENT CARE | Facility: URGENT CARE | Age: 85
End: 2020-03-05
Payer: MEDICARE

## 2020-03-05 VITALS
RESPIRATION RATE: 16 BRPM | BODY MASS INDEX: 20.63 KG/M2 | TEMPERATURE: 97.9 F | HEART RATE: 81 BPM | WEIGHT: 124 LBS | OXYGEN SATURATION: 97 % | SYSTOLIC BLOOD PRESSURE: 150 MMHG | DIASTOLIC BLOOD PRESSURE: 70 MMHG

## 2020-03-05 DIAGNOSIS — J34.89 SINUS DRAINAGE: ICD-10-CM

## 2020-03-05 DIAGNOSIS — J34.89 SINUS PAIN: ICD-10-CM

## 2020-03-05 DIAGNOSIS — J34.89 PURULENT NASAL DISCHARGE: ICD-10-CM

## 2020-03-05 DIAGNOSIS — H10.31 ACUTE BACTERIAL CONJUNCTIVITIS OF RIGHT EYE: Primary | ICD-10-CM

## 2020-03-05 PROCEDURE — 99203 OFFICE O/P NEW LOW 30 MIN: CPT | Performed by: PHYSICIAN ASSISTANT

## 2020-03-05 RX ORDER — AZITHROMYCIN 250 MG/1
TABLET, FILM COATED ORAL
Qty: 6 TABLET | Refills: 0 | Status: SHIPPED | OUTPATIENT
Start: 2020-03-05 | End: 2020-05-13

## 2020-03-05 RX ORDER — OFLOXACIN 3 MG/ML
1-2 SOLUTION/ DROPS OPHTHALMIC 4 TIMES DAILY
Qty: 1 BOTTLE | Refills: 0 | Status: SHIPPED | OUTPATIENT
Start: 2020-03-05 | End: 2020-03-05

## 2020-03-05 RX ORDER — OFLOXACIN 3 MG/ML
1-2 SOLUTION/ DROPS OPHTHALMIC 4 TIMES DAILY
Qty: 1 BOTTLE | Refills: 0 | Status: SHIPPED | OUTPATIENT
Start: 2020-03-05 | End: 2020-03-12

## 2020-03-05 RX ORDER — AZITHROMYCIN 250 MG/1
TABLET, FILM COATED ORAL
Qty: 6 TABLET | Refills: 0 | Status: SHIPPED | OUTPATIENT
Start: 2020-03-05 | End: 2020-03-05

## 2020-03-06 NOTE — PROGRESS NOTES
SUBJECTIVE:   Lori Fall is a 89 year old female presenting with a chief complaint of having sinus congestion, sinus drainage, purulent nasal drainage and right eye mattering.  Onset of symptoms was 3 day(s) ago.  Course of illness is same.    Severity moderate  Current and Associated symptoms: eye redness, mattering and drainage  Treatment measures tried include OTC Cough med.  Predisposing factors include recent illness.    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     PUD (peptic ulcer disease) 1960s     Pure hypercholesterolemia     abstracted 02        Allergies   Allergen Reactions     Aminoglycosides      neosporin onintment - rash     Hctz Hives     Penicillins      Sulfa Drugs      Family History   Problem Relation Age of Onset     Heart Disease Mother         d:age 66     Heart Disease Father         d:age 62     Family History Negative Brother         d:age 35  in war     Cancer Brother         d:age 53 cancer esophagus, alcoholic     Heart Disease Brother         b:1933 bypass surgery       Social History     Tobacco Use     Smoking status: Never Smoker     Smokeless tobacco: Never Used   Substance Use Topics     Alcohol use: No     Alcohol/week: 0.0 standard drinks     Frequency: Never     Comment: occasional wine       ROS:  CONSTITUTIONAL:NEGATIVE for fever, chills, change in weight  INTEGUMENTARY/SKIN: NEGATIVE for worrisome rashes, moles or lesions  EYES: POSITIVE for right eye redness and mattering  ENT/MOUTH: POSITIVE for runny nose, nasal congestion  RESP:POSITIVE for cough-non productive  CV: NEGATIVE for chest pain, palpitations or peripheral edema  GI: NEGATIVE for nausea, abdominal pain, heartburn,  or change in bowel habits  : negative for and dysuria  MUSCULOSKELETAL: NEGATIVE for significant arthralgias or myalgia  NEURO: NEGATIVE for weakness, dizziness or paresthesias    OBJECTIVE  :BP (!) 150/70   Pulse 81   Temp 97.9  F (36.6  C) (Oral)   Resp 16   Wt 56.2 kg (124 lb)   LMP  (LMP Unknown)   SpO2 97%   BMI 20.63 kg/m    GENERAL APPEARANCE: healthy, alert and no distress  EYES: EOMI,  PERRL, conjunctiva clear  HENT: ear canals and TM's normal.  Nose and mouth without ulcers, erythema or lesions  NECK: supple, nontender, no lymphadenopathy  RESP: lungs clear to auscultation - no rales, rhonchi or wheezes  CV: regular rates and rhythm, normal S1 S2, no murmur noted  ABDOMEN:  soft, nontender, no HSM or masses and bowel sounds normal  Extremities: no peripheral edema or tenderness, peripheral pulses normal  MS: extremities normal- no gross deformities noted, no erythema, FROM noted in all extremities  NEURO: Normal strength and tone, sensory exam grossly normal,  normal speech and mentation  SKIN: no suspicious lesions or rashes    ASSESSMENT/PLAN      ICD-10-CM    1. Acute bacterial conjunctivitis of right eye H10.31 ofloxacin (OCUFLOX) 0.3 % ophthalmic solution     DISCONTINUED: ofloxacin (OCUFLOX) 0.3 % ophthalmic solution   2. Sinus drainage J34.89 azithromycin (ZITHROMAX) 250 MG tablet     DISCONTINUED: azithromycin (ZITHROMAX) 250 MG tablet   3. Sinus pain J34.89 azithromycin (ZITHROMAX) 250 MG tablet     DISCONTINUED: azithromycin (ZITHROMAX) 250 MG tablet   4. Purulent nasal discharge J34.89 azithromycin (ZITHROMAX) 250 MG tablet     DISCONTINUED: azithromycin (ZITHROMAX) 250 MG tablet         azithromycin (ZITHROMAX) 250 MG tablet     ofloxacin (OCUFLOX) 0.3 % ophthalmic solution       Patient placed on eye drops for bacterial conjuctivitis  artur for sinus congestion, purulent drainage  Fluids, rest  Follow up with PCP as needed  See orders in Epic

## 2020-03-17 DIAGNOSIS — E78.5 HYPERLIPIDEMIA LDL GOAL <100: ICD-10-CM

## 2020-03-17 RX ORDER — FENOFIBRATE 160 MG/1
TABLET ORAL
Qty: 90 TABLET | Refills: 3 | Status: SHIPPED | OUTPATIENT
Start: 2020-03-17 | End: 2020-05-13

## 2020-03-17 NOTE — TELEPHONE ENCOUNTER
Unable to refill per [facility] protocol.  Med and pharm loaded.    Marielena RENDONN, RN    [facility]  [facility]  Office: [phone]  Fax: [phone]

## 2020-03-18 DIAGNOSIS — E78.5 HYPERLIPIDEMIA LDL GOAL <100: ICD-10-CM

## 2020-03-18 RX ORDER — SIMVASTATIN 40 MG
40 TABLET ORAL AT BEDTIME
Qty: 90 TABLET | Refills: 0 | Status: SHIPPED | OUTPATIENT
Start: 2020-03-18 | End: 2020-05-13

## 2020-03-18 NOTE — TELEPHONE ENCOUNTER
Unable to refill per [facility] protocol.  Med and pharm load.    Marielena RENDONN, RN    [facility]  Vascular [facility]  Office: [phone]  Fax: [phone]

## 2020-03-31 ENCOUNTER — DOCUMENTATION ONLY (OUTPATIENT)
Dept: OTHER | Facility: CLINIC | Age: 85
End: 2020-03-31

## 2020-03-31 DIAGNOSIS — I10 ESSENTIAL HYPERTENSION WITH GOAL BLOOD PRESSURE LESS THAN 130/80: ICD-10-CM

## 2020-03-31 RX ORDER — LISINOPRIL 40 MG/1
40 TABLET ORAL DAILY
Qty: 90 TABLET | Refills: 0 | Status: SHIPPED | OUTPATIENT
Start: 2020-03-31 | End: 2020-05-13

## 2020-03-31 NOTE — TELEPHONE ENCOUNTER
lisinopril (PRINIVIL/ZESTRIL) 40 MG tablet  Last Written Prescription Date:  3/1/19  Last Fill Quantity: 90,  # refills: 3   Last office visit: 4/3/19    Patient is due for annual exam.  Due to COVID-19 pandemic, this will be postponed.    90 day rx loaded.  Unable to fill per [facility] protocol.    ACE Inhibitors (Including Combos) Protocol [id number]/31/2020 01:33 PM   Blood pressure under 140/90 in past 12 months Protocol Details    Normal serum creatinine on file in past 12 months     Normal serum potassium on file in past 12 months        Judit Lambert RN BSN  [facility]  [phone]

## 2020-03-31 NOTE — PROGRESS NOTES
Patient has lab only appointment scheduled for 04/03/20.   Due to current coronavirus pandemic, please consider whether these lab tests can be deferred.     Please open ORDER REVIEW, review orders, and then confirm or defer orders ASAP.  Enter <dot>keep or <dot>defer smart phrase into NOTES, complete documentation, and route encounter.  [facility]: /team  [facility]: individual provider pool  [facility] primary care:   [facility] specialty: scheduling pool

## 2020-03-31 NOTE — PROGRESS NOTES
Called Lori to notify her that due to COVID-19 we will be postponing her labs and office visit.    Judit Lambert RN BSN  [facility]

## 2020-04-27 DIAGNOSIS — I10 ESSENTIAL HYPERTENSION WITH GOAL BLOOD PRESSURE LESS THAN 130/80: ICD-10-CM

## 2020-04-27 NOTE — TELEPHONE ENCOUNTER
amLODIPine (NORVASC) 10 MG tablet  Last Written Prescription Date:  3/1/19  Last Fill Quantity: 90,  # refills: 3   Last office visit:  4/3/19    Patient is due for yearly exam.  Due to covid-19 pandemic, 90 day supply loaded for review/signature.      Calcium Channel Blockers Protocol [id number]/27/2020 04:42 PM   Blood pressure under 140/90 in past 12 months Protocol Details    Recent (12 mo) or future (30 days) visit within the authorizing provider's specialty     Normal serum creatinine on file in past 12 months

## 2020-04-28 RX ORDER — AMLODIPINE BESYLATE 10 MG/1
10 TABLET ORAL DAILY
Qty: 90 TABLET | Refills: 0 | Status: SHIPPED | OUTPATIENT
Start: 2020-04-28 | End: 2020-05-13

## 2020-04-29 DIAGNOSIS — M85.80 OSTEOPENIA, UNSPECIFIED LOCATION: ICD-10-CM

## 2020-04-29 RX ORDER — ALENDRONATE SODIUM 35 MG/1
35 TABLET ORAL WEEKLY
Qty: 13 TABLET | Refills: 1 | Status: SHIPPED | OUTPATIENT
Start: 2020-04-29 | End: 2020-05-13

## 2020-04-29 NOTE — TELEPHONE ENCOUNTER
alendronate (FOSAMAX) 35 MG tablet  Last Written Prescription Date:  19  Last Fill Quantity: 13,  # refills: 3   Last office visit: 3/1/19    Rx was originally sent for one year supply to  2020.  Sent six month's worth per last prescription.    Judit Lambert RN BSN  [facility]  [phone]

## 2020-05-06 DIAGNOSIS — Z00.00 MEDICARE ANNUAL WELLNESS VISIT, SUBSEQUENT: ICD-10-CM

## 2020-05-06 LAB
ALBUMIN SERPL-MCNC: 3.7 G/DL (ref 3.4–5)
ALP SERPL-CCNC: 60 U/L (ref 40–150)
ALT SERPL W P-5'-P-CCNC: 25 U/L (ref 0–50)
ANION GAP SERPL CALCULATED.3IONS-SCNC: 6 MMOL/L (ref 3–14)
AST SERPL W P-5'-P-CCNC: 30 U/L (ref 0–45)
BASOPHILS # BLD AUTO: 0 10E9/L (ref 0–0.2)
BASOPHILS NFR BLD AUTO: 0.1 %
BILIRUB DIRECT SERPL-MCNC: 0.2 MG/DL (ref 0–0.2)
BILIRUB SERPL-MCNC: 0.6 MG/DL (ref 0.2–1.3)
BUN SERPL-MCNC: 19 MG/DL (ref 7–30)
CALCIUM SERPL-MCNC: 9 MG/DL (ref 8.5–10.1)
CHLORIDE SERPL-SCNC: 103 MMOL/L (ref 94–109)
CHOLEST SERPL-MCNC: 164 MG/DL
CO2 SERPL-SCNC: 26 MMOL/L (ref 20–32)
CREAT SERPL-MCNC: 0.7 MG/DL (ref 0.52–1.04)
DIFFERENTIAL METHOD BLD: ABNORMAL
EOSINOPHIL # BLD AUTO: 0 10E9/L (ref 0–0.7)
EOSINOPHIL NFR BLD AUTO: 0.4 %
ERYTHROCYTE [DISTWIDTH] IN BLOOD BY AUTOMATED COUNT: 16.5 % (ref 10–15)
GFR SERPL CREATININE-BSD FRML MDRD: 76 ML/MIN/{1.73_M2}
GLUCOSE SERPL-MCNC: 120 MG/DL (ref 70–99)
HCT VFR BLD AUTO: 41.7 % (ref 35–47)
HDLC SERPL-MCNC: 64 MG/DL
HGB BLD-MCNC: 13.7 G/DL (ref 11.7–15.7)
LDLC SERPL CALC-MCNC: 89 MG/DL
LYMPHOCYTES # BLD AUTO: 1.7 10E9/L (ref 0.8–5.3)
LYMPHOCYTES NFR BLD AUTO: 24.5 %
MCH RBC QN AUTO: 29.8 PG (ref 26.5–33)
MCHC RBC AUTO-ENTMCNC: 32.9 G/DL (ref 31.5–36.5)
MCV RBC AUTO: 91 FL (ref 78–100)
MONOCYTES # BLD AUTO: 0.8 10E9/L (ref 0–1.3)
MONOCYTES NFR BLD AUTO: 11.5 %
NEUTROPHILS # BLD AUTO: 4.5 10E9/L (ref 1.6–8.3)
NEUTROPHILS NFR BLD AUTO: 63.5 %
NONHDLC SERPL-MCNC: 100 MG/DL
PLATELET # BLD AUTO: 308 10E9/L (ref 150–450)
POTASSIUM SERPL-SCNC: 4.1 MMOL/L (ref 3.4–5.3)
PROT SERPL-MCNC: 7.5 G/DL (ref 6.8–8.8)
RBC # BLD AUTO: 4.6 10E12/L (ref 3.8–5.2)
SODIUM SERPL-SCNC: 135 MMOL/L (ref 133–144)
T3FREE SERPL-MCNC: 2.8 PG/ML (ref 2.3–4.2)
T4 FREE SERPL-MCNC: 1.11 NG/DL (ref 0.76–1.46)
TRIGL SERPL-MCNC: 55 MG/DL
TSH SERPL DL<=0.005 MIU/L-ACNC: 1.76 MU/L (ref 0.4–4)
WBC # BLD AUTO: 7 10E9/L (ref 4–11)

## 2020-05-06 PROCEDURE — 80061 LIPID PANEL: CPT | Performed by: FAMILY MEDICINE

## 2020-05-06 PROCEDURE — 80048 BASIC METABOLIC PNL TOTAL CA: CPT | Performed by: FAMILY MEDICINE

## 2020-05-06 PROCEDURE — 84481 FREE ASSAY (FT-3): CPT | Performed by: INTERNAL MEDICINE

## 2020-05-06 PROCEDURE — 85025 COMPLETE CBC W/AUTO DIFF WBC: CPT | Performed by: FAMILY MEDICINE

## 2020-05-06 PROCEDURE — 84443 ASSAY THYROID STIM HORMONE: CPT | Performed by: FAMILY MEDICINE

## 2020-05-06 PROCEDURE — 80076 HEPATIC FUNCTION PANEL: CPT | Performed by: FAMILY MEDICINE

## 2020-05-06 PROCEDURE — 84439 ASSAY OF FREE THYROXINE: CPT | Performed by: FAMILY MEDICINE

## 2020-05-06 PROCEDURE — 36415 COLL VENOUS BLD VENIPUNCTURE: CPT | Performed by: FAMILY MEDICINE

## 2020-05-13 ENCOUNTER — VIRTUAL VISIT (OUTPATIENT)
Dept: OTHER | Facility: CLINIC | Age: 85
End: 2020-05-13
Attending: INTERNAL MEDICINE
Payer: MEDICARE

## 2020-05-13 VITALS — WEIGHT: 125 LBS | BODY MASS INDEX: 20.8 KG/M2

## 2020-05-13 DIAGNOSIS — I10 ESSENTIAL HYPERTENSION WITH GOAL BLOOD PRESSURE LESS THAN 130/80: Primary | ICD-10-CM

## 2020-05-13 DIAGNOSIS — E78.5 HYPERLIPIDEMIA LDL GOAL <100: ICD-10-CM

## 2020-05-13 DIAGNOSIS — R73.01 IFG (IMPAIRED FASTING GLUCOSE): ICD-10-CM

## 2020-05-13 DIAGNOSIS — M85.80 OSTEOPENIA, UNSPECIFIED LOCATION: ICD-10-CM

## 2020-05-13 DIAGNOSIS — R94.6 BORDERLINE ABNORMAL TFTS: ICD-10-CM

## 2020-05-13 PROCEDURE — 99443 ZZC PHYSICIAN TELEPHONE EVALUATION 21-30 MIN: CPT | Performed by: INTERNAL MEDICINE

## 2020-05-13 RX ORDER — ALENDRONATE SODIUM 35 MG/1
35 TABLET ORAL WEEKLY
Qty: 13 TABLET | Refills: 3 | Status: SHIPPED | OUTPATIENT
Start: 2020-05-13 | End: 2021-04-09

## 2020-05-13 RX ORDER — AMLODIPINE BESYLATE 10 MG/1
10 TABLET ORAL DAILY
Qty: 90 TABLET | Refills: 3 | Status: SHIPPED | OUTPATIENT
Start: 2020-05-13 | End: 2021-04-09

## 2020-05-13 RX ORDER — METOPROLOL TARTRATE 100 MG
100 TABLET ORAL 2 TIMES DAILY
Qty: 180 TABLET | Refills: 3 | Status: SHIPPED | OUTPATIENT
Start: 2020-05-13 | End: 2021-03-01

## 2020-05-13 RX ORDER — SIMVASTATIN 40 MG
40 TABLET ORAL AT BEDTIME
Qty: 90 TABLET | Refills: 3 | Status: SHIPPED | OUTPATIENT
Start: 2020-05-13 | End: 2021-04-09

## 2020-05-13 RX ORDER — FENOFIBRATE 160 MG/1
160 TABLET ORAL DAILY
Qty: 90 TABLET | Refills: 3 | Status: SHIPPED | OUTPATIENT
Start: 2020-05-13 | End: 2021-04-09

## 2020-05-13 RX ORDER — LISINOPRIL 40 MG/1
40 TABLET ORAL DAILY
Qty: 90 TABLET | Refills: 3 | Status: SHIPPED | OUTPATIENT
Start: 2020-05-13 | End: 2021-04-09

## 2020-05-13 RX ORDER — EZETIMIBE 10 MG/1
10 TABLET ORAL AT BEDTIME
Qty: 90 TABLET | Refills: 3 | Status: SHIPPED | OUTPATIENT
Start: 2020-05-13 | End: 2021-04-09

## 2020-05-13 NOTE — PROGRESS NOTES
"Lori Fall is a 89 year old female who is being evaluated via a billable telephone visit.      The patient has been notified of following:     \"This telephone visit will be conducted via a call between you and your physician/provider. We have found that certain health care needs can be provided without the need for a physical exam.  This service lets us provide the care you need with a short phone conversation.  If a prescription is necessary we can send it directly to your pharmacy.  If lab work is needed we can place an order for that and you can then stop by our lab to have the test done at a later time.    Telephone visits are billed at different rates depending on your insurance coverage. During this emergency period, for some insurers they may be billed the same as an in-person visit.  Please reach out to your insurance provider with any questions.    If during the course of the call the physician/provider feels a telephone visit is not appropriate, you will not be charged for this service.\"    Patient has given verbal consent for Telephone visit?  yes    What phone number would you like to be contacted at? Home phone number     How would you like to obtain your AVS? Mailed   "

## 2020-05-13 NOTE — PATIENT INSTRUCTIONS
Dr. Thomas Sandoval would like you to have fasting labs and a urinalysis in mid August followed by an office visit one week later.    If you have any questions, please feel free to contact me through [employer] or by calling [phone].    Judit Lambert RN BSN  For Dr. Alcides Guzman  [facility]

## 2020-05-13 NOTE — PROGRESS NOTES
Lori Fall is a 89 year old female who is presenting at the current time to discuss her diagnosi(es) of        Essential hypertension with goal blood pressure less than 130/80  Hyperlipidemia LDL goal <100  Borderline abnormal TFTs  IFG (impaired fasting glucose)  Osteopenia, unspecified location .      Review Of Systems  Skin: negative  Eyes: negative  Ears/Nose/Throat: negative  Respiratory: No shortness of breath, dyspnea on exertion, cough, or hemoptysis  Cardiovascular: negative  Gastrointestinal: negative  Genitourinary: negative  Musculoskeletal: negative  Neurologic: negative  Psychiatric: negative  Hematologic/Lymphatic/Immunologic: negative  Endocrine: negative     PAST MEDICAL HISTORY:                  Past Medical History:   Diagnosis Date     Essential hypertension, benign     abstracted 7/5/02     Hyperlipidemia LDL goal < 100      Impaired fasting glucose      NONSPECIFIC MEDICAL HISTORY     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     Osteopenia      Primary thrombocytopenia 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     PUD (peptic ulcer disease) 1960s     Pure hypercholesterolemia     abstracted 7/5/02       PAST SURGICAL HISTORY:                  Past Surgical History:   Procedure Laterality Date     C NONSPECIFIC PROCEDURE  1995    splenectomy -- abstracted 7/5/02     C NONSPECIFIC PROCEDURE  1977    [facility]WIN  BSO appendectomy     C NONSPECIFIC PROCEDURE  1994    benign breast bopsy     C NONSPECIFIC PROCEDURE  6/28/2010    Left total hip arthroplasty, Bijal uncemented components     ESOPHAGOSCOPY, GASTROSCOPY, DUODENOSCOPY (EGD), COMBINED N/A 5/7/2017    Procedure: COMBINED ESOPHAGOSCOPY, GASTROSCOPY, DUODENOSCOPY (EGD), BIOPSY SINGLE OR MULTIPLE;  EGD;  Surgeon: Guero Olmedo MD;  Location:  GI     TONSILLECTOMY & ADENOIDECTOMY  1956       CURRENT MEDICATIONS:                  Current Outpatient Medications   Medication Sig Dispense Refill     alendronate (FOSAMAX) 35 MG tablet Take  1 tablet (35 mg) by mouth once a week 13 tablet 3     amLODIPine (NORVASC) 10 MG tablet Take 1 tablet (10 mg) by mouth daily 90 tablet 3     Calcium Carbonate-Vitamin D (CALCIUM + D) 600-200 MG-UNIT per tablet Take 2 tablets by mouth 2 times daily.       ezetimibe (ZETIA) 10 MG tablet Take 1 tablet (10 mg) by mouth At Bedtime 90 tablet 3     fenofibrate (TRIGLIDE/LOFIBRA) 160 MG tablet Take 1 tablet (160 mg) by mouth daily 90 tablet 3     lisinopril (ZESTRIL) 40 MG tablet Take 1 tablet (40 mg) by mouth daily 90 tablet 3     metoprolol tartrate (LOPRESSOR) 100 MG tablet Take 1 tablet (100 mg) by mouth 2 times daily 180 tablet 3     omeprazole (PRILOSEC) 40 MG DR capsule Take 1 capsule (40 mg) by mouth 2 times daily Take 30-60 minutes before a meal. 180 capsule 3     simvastatin (ZOCOR) 40 MG tablet Take 1 tablet (40 mg) by mouth At Bedtime 90 tablet 3       ALLERGIES:                  Allergies   Allergen Reactions     Aminoglycosides      neosporin onintment - rash     Hctz Hives     Penicillins      Sulfa Drugs        SOCIAL HISTORY:                  Social History     Socioeconomic History     Marital status:      Spouse name: Nhan     Number of children: 1     Years of education: 12     Highest education level: Not on file   Occupational History     Occupation: retired     Comment: retired      Employer: RETIRED   Social Needs     Financial resource strain: Not on file     Food insecurity     Worry: Not on file     Inability: Not on file     Transportation needs     Medical: Not on file     Non-medical: Not on file   Tobacco Use     Smoking status: Never Smoker     Smokeless tobacco: Never Used   Substance and Sexual Activity     Alcohol use: No     Alcohol/week: 0.0 standard drinks     Frequency: Never     Comment: occasional wine     Drug use: No     Sexual activity: Yes     Partners: Male   Lifestyle     Physical activity     Days per week: Not on file     Minutes per session: Not on file      Stress: Not on file   Relationships     Social connections     Talks on phone: Not on file     Gets together: Not on file     Attends Sikhism service: Not on file     Active member of club or organization: Not on file     Attends meetings of clubs or organizations: Not on file     Relationship status: Not on file     Intimate partner violence     Fear of current or ex partner: Not on file     Emotionally abused: Not on file     Physically abused: Not on file     Forced sexual activity: Not on file   Other Topics Concern     Parent/sibling w/ CABG, MI or angioplasty before 65F 55M? Not Asked   Social History Narrative     Not on file       FAMILY HISTORY:                   Family History   Problem Relation Age of Onset     Heart Disease Mother         d:age 66     Heart Disease Father         d:age 62     Family History Negative Brother         d:age 35  in war     Cancer Brother         d:age 53 cancer esophagus, alcoholic     Heart Disease Brother         b:1933 bypass surgery        Physical exam was not performed was not performed as this was a [place] coronavirus mandated billable telephone encounter.      Component      Latest Ref Rng & Units 2020   WBC      4.0 - 11.0 10e9/L 7.0   RBC Count      3.8 - 5.2 10e12/L 4.60   Hemoglobin      11.7 - 15.7 g/dL 13.7   Hematocrit      35.0 - 47.0 % 41.7   MCV      78 - 100 fl 91   MCH      26.5 - 33.0 pg 29.8   MCHC      31.5 - 36.5 g/dL 32.9   RDW      10.0 - 15.0 % 16.5 (H)   Platelet Count      150 - 450 10e9/L 308   % Neutrophils      % 63.5   % Lymphocytes      % 24.5   % Monocytes      % 11.5   % Eosinophils      % 0.4   % Basophils      % 0.1   Absolute Neutrophil      1.6 - 8.3 10e9/L 4.5   Absolute Lymphocytes      0.8 - 5.3 10e9/L 1.7   Absolute Monocytes      0.0 - 1.3 10e9/L 0.8   Absolute Eosinophils      0.0 - 0.7 10e9/L 0.0   Absolute Basophils      0.0 - 0.2 10e9/L 0.0   Diff Method       Automated Method   Sodium      133 - 144 mmol/L 135    Potassium      3.4 - 5.3 mmol/L 4.1   Chloride      94 - 109 mmol/L 103   Carbon Dioxide      20 - 32 mmol/L 26   Anion Gap      3 - 14 mmol/L 6   Glucose      70 - 99 mg/dL 120 (H)   Urea Nitrogen      7 - 30 mg/dL 19   Creatinine      0.52 - 1.04 mg/dL 0.70   GFR Estimate      >60 mL/min/1.73:m2 76   GFR Estimate If Black      >60 mL/min/1.73:m2 88   Calcium      8.5 - 10.1 mg/dL 9.0   Bilirubin Direct      0.0 - 0.2 mg/dL 0.2   Bilirubin Total      0.2 - 1.3 mg/dL 0.6   Albumin      3.4 - 5.0 g/dL 3.7   Protein Total      6.8 - 8.8 g/dL 7.5   Alkaline Phosphatase      40 - 150 U/L 60   ALT      0 - 50 U/L 25   AST      0 - 45 U/L 30   Cholesterol      <200 mg/dL 164   Triglycerides      <150 mg/dL 55   HDL Cholesterol      >49 mg/dL 64   LDL Cholesterol Calculated      <100 mg/dL 89   Non HDL Cholesterol      <130 mg/dL 100   TSH      0.40 - 4.00 mU/L 1.76   T4 Free      0.76 - 1.46 ng/dL 1.11   Free T3      2.3 - 4.2 pg/mL 2.8   Lori Fall is a 89 year old female who is presenting at the current time to discuss her diagnosi(es) of        Essential hypertension with goal blood pressure less than 130/80  Hyperlipidemia LDL goal <100  Borderline abnormal TFTs  IFG (impaired fasting glucose) .      HPI: the patient is a hypertensive, hyperlipidemic, IFG patient presenting for lab f/u.    Review Of Systems  Skin: negative  Eyes: negative  Ears/Nose/Throat: negative  Respiratory: No shortness of breath, dyspnea on exertion, cough, or hemoptysis  Cardiovascular: negative  Gastrointestinal: negative  Genitourinary: negative  Musculoskeletal: negative  Neurologic: negative  Psychiatric: negative  Hematologic/Lymphatic/Immunologic: negative  Endocrine: negative     PAST MEDICAL HISTORY:                  Past Medical History:   Diagnosis Date     Essential hypertension, benign     abstracted 7/5/02     Hyperlipidemia LDL goal < 100      Impaired fasting glucose      NONSPECIFIC MEDICAL HISTORY     Norvasc induced  edema for which she takes lasix     Osteopenia      Primary thrombocytopenia 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     PUD (peptic ulcer disease) 1960s     Pure hypercholesterolemia     abstracted 7/5/02       PAST SURGICAL HISTORY:                  Past Surgical History:   Procedure Laterality Date     C NONSPECIFIC PROCEDURE  1995    splenectomy -- abstracted 7/5/02     C NONSPECIFIC PROCEDURE  1977    [facility]  BSO appendectomy     C NONSPECIFIC PROCEDURE  1994    benign breast bopsy     C NONSPECIFIC PROCEDURE  6/28/2010    Left total hip arthroplasty, Bijal uncemented components     ESOPHAGOSCOPY, GASTROSCOPY, DUODENOSCOPY (EGD), COMBINED N/A 5/7/2017    Procedure: COMBINED ESOPHAGOSCOPY, GASTROSCOPY, DUODENOSCOPY (EGD), BIOPSY SINGLE OR MULTIPLE;  EGD;  Surgeon: Guero Olmedo MD;  Location:  GI     TONSILLECTOMY & ADENOIDECTOMY  1956       CURRENT MEDICATIONS:                  Current Outpatient Medications   Medication Sig Dispense Refill     alendronate (FOSAMAX) 35 MG tablet Take 1 tablet (35 mg) by mouth once a week 13 tablet 1     amLODIPine (NORVASC) 10 MG tablet Take 1 tablet (10 mg) by mouth daily 90 tablet 0     azithromycin (ZITHROMAX) 250 MG tablet 2 tabs po qd day 1, then 1 tab po qd days 2-5 6 tablet 0     Calcium Carbonate-Vitamin D (CALCIUM + D) 600-200 MG-UNIT per tablet Take 2 tablets by mouth 2 times daily.       ezetimibe (ZETIA) 10 MG tablet Take 1 tablet (10 mg) by mouth At Bedtime 90 tablet 3     ezetimibe (ZETIA) 10 MG tablet Take 1 tablet (10 mg) by mouth At Bedtime 90 tablet 3     fenofibrate (TRIGLIDE/LOFIBRA) 160 MG tablet TAKE 1 TABLET BY MOUTH DAILY 90 tablet 3     lisinopril (ZESTRIL) 40 MG tablet Take 1 tablet (40 mg) by mouth daily 90 tablet 0     metoprolol tartrate (LOPRESSOR) 100 MG tablet Take 1 tablet (100 mg) by mouth 2 times daily 180 tablet 3     omeprazole (PRILOSEC) 40 MG DR capsule Take 1 capsule (40 mg) by mouth 2 times daily Take 30-60 minutes before a meal. 180  capsule 3     pantoprazole (PROTONIX) 40 MG EC tablet Take 1 tablet (40 mg) by mouth 2 times daily 60 tablet 3     simvastatin (ZOCOR) 40 MG tablet Take 1 tablet (40 mg) by mouth At Bedtime 90 tablet 0       ALLERGIES:                  Allergies   Allergen Reactions     Aminoglycosides      neosporin onintment - rash     Hctz Hives     Penicillins      Sulfa Drugs        SOCIAL HISTORY:                  Social History     Socioeconomic History     Marital status:      Spouse name: Nhan     Number of children: Tadeo     Years of education: 12     Highest education level: Not on file   Occupational History     Occupation: retired     Comment: retired      Employer: RETIRED   Social Needs     Financial resource strain: Not on file     Food insecurity     Worry: Not on file     Inability: Not on file     Transportation needs     Medical: Not on file     Non-medical: Not on file   Tobacco Use     Smoking status: Never Smoker     Smokeless tobacco: Never Used   Substance and Sexual Activity     Alcohol use: No     Alcohol/week: 0.0 standard drinks     Frequency: Never     Comment: occasional wine     Drug use: No     Sexual activity: Yes     Partners: Male   Lifestyle     Physical activity     Days per week: Not on file     Minutes per session: Not on file     Stress: Not on file   Relationships     Social connections     Talks on phone: Not on file     Gets together: Not on file     Attends Druze service: Not on file     Active member of club or organization: Not on file     Attends meetings of clubs or organizations: Not on file     Relationship status: Not on file     Intimate partner violence     Fear of current or ex partner: Not on file     Emotionally abused: Not on file     Physically abused: Not on file     Forced sexual activity: Not on file   Other Topics Concern     Parent/sibling w/ CABG, MI or angioplasty before 65F 55M? Not Asked   Social History Narrative     Not on file       FAMILY  HISTORY:                   Family History   Problem Relation Age of Onset     Heart Disease Mother         d:age 66     Heart Disease Father         d:age 62     Family History Negative Brother         d:age 35  in war     Cancer Brother         d:age 53 cancer esophagus, alcoholic     Heart Disease Brother         b:1933 bypass surgery         Physical exam was not performed as this was a [place] coronavirus mandated billable telephone encounter.    Component      Latest Ref Rng & Units 2020   WBC      4.0 - 11.0 10e9/L 7.0   RBC Count      3.8 - 5.2 10e12/L 4.60   Hemoglobin      11.7 - 15.7 g/dL 13.7   Hematocrit      35.0 - 47.0 % 41.7   MCV      78 - 100 fl 91   MCH      26.5 - 33.0 pg 29.8   MCHC      31.5 - 36.5 g/dL 32.9   RDW      10.0 - 15.0 % 16.5 (H)   Platelet Count      150 - 450 10e9/L 308   % Neutrophils      % 63.5   % Lymphocytes      % 24.5   % Monocytes      % 11.5   % Eosinophils      % 0.4   % Basophils      % 0.1   Absolute Neutrophil      1.6 - 8.3 10e9/L 4.5   Absolute Lymphocytes      0.8 - 5.3 10e9/L 1.7   Absolute Monocytes      0.0 - 1.3 10e9/L 0.8   Absolute Eosinophils      0.0 - 0.7 10e9/L 0.0   Absolute Basophils      0.0 - 0.2 10e9/L 0.0   Diff Method       Automated Method   Sodium      133 - 144 mmol/L 135   Potassium      3.4 - 5.3 mmol/L 4.1   Chloride      94 - 109 mmol/L 103   Carbon Dioxide      20 - 32 mmol/L 26   Anion Gap      3 - 14 mmol/L 6   Glucose      70 - 99 mg/dL 120 (H)   Urea Nitrogen      7 - 30 mg/dL 19   Creatinine      0.52 - 1.04 mg/dL 0.70   GFR Estimate      >60 mL/min/1.73:m2 76   GFR Estimate If Black      >60 mL/min/1.73:m2 88   Calcium      8.5 - 10.1 mg/dL 9.0   Bilirubin Direct      0.0 - 0.2 mg/dL 0.2   Bilirubin Total      0.2 - 1.3 mg/dL 0.6   Albumin      3.4 - 5.0 g/dL 3.7   Protein Total      6.8 - 8.8 g/dL 7.5   Alkaline Phosphatase      40 - 150 U/L 60   ALT      0 - 50 U/L 25   AST      0 - 45 U/L 30   Cholesterol      <200 mg/dL 164    Triglycerides      <150 mg/dL 55   HDL Cholesterol      >49 mg/dL 64   LDL Cholesterol Calculated      <100 mg/dL 89   Non HDL Cholesterol      <130 mg/dL 100   TSH      0.40 - 4.00 mU/L 1.76   T4 Free      0.76 - 1.46 ng/dL 1.11   Free T3      2.3 - 4.2 pg/mL 2.8       A/P:    (I10) Essential hypertension with goal blood pressure less than 130/80  (primary encounter diagnosis)  Comment: at goal at home  Plan: amLODIPine (NORVASC) 10 MG tablet, lisinopril         (ZESTRIL) 40 MG tablet, metoprolol tartrate         (LOPRESSOR) 100 MG tablet, Follow-Up with         Vascular Medicine            (E78.5) Hyperlipidemia LDL goal <100  Comment: at LDL goal  Plan: ezetimibe (ZETIA) 10 MG tablet, fenofibrate         (TRIGLIDE/LOFIBRA) 160 MG tablet, simvastatin         (ZOCOR) 40 MG tablet, Follow-Up with Vascular         Medicine, Lipid Profile            (R94.6) Borderline abnormal TFTs  Comment: resolved  Plan: Follow-Up with Vascular Medicine, T4 free, T3         Free, TSH           (R73.01) IFG (impaired fasting glucose)  Comment: avoid CHO  Plan: Follow-Up with Vascular Medicine, Basic         metabolic panel, Hemoglobin A1c, Lipid Profile,        Albumin Random Urine Quantitative with Creat         Ratio            (M85.80) Osteopenia, unspecified location  Comment: doing well  Plan: alendronate (FOSAMAX) 35 MG tablet, Follow-Up         with Vascular Medicine            Greater than one half the twenty five minutes total spent on the pt's visit were spent providing education and counselling to the patient regarding the above matters.

## 2020-08-17 ENCOUNTER — TELEPHONE (OUTPATIENT)
Dept: OTHER | Facility: CLINIC | Age: 85
End: 2020-08-17

## 2020-08-17 NOTE — TELEPHONE ENCOUNTER
Patient needs to be scheduled for fasting labs and urine test and virtual follow or in-person follow up 1 week later (up to patient)  with Dr. Guzman.     Marielena RENDONN, RN    [facility] [place]  Office: [phone]  Fax: [phone]

## 2020-08-17 NOTE — TELEPHONE ENCOUNTER
August 17, 2020    Patient requesting follow-up appointment with Dr. Guzman.     Patient has f/u order that has an expected date of 8/2020.    Routing to RN for review and to make a determination of what type of follow-up appointment the patient will need (e-visit or in-person) as current f/u order is unclear.     Patient is unsure of how comfortable she is with pursing any follow-up appointments at this time.     Raya Barkley    [facility]  Office: [phone]  Fax [phone]

## 2020-08-20 NOTE — TELEPHONE ENCOUNTER
August 20, 2020    Left message requesting that patient call [facility] to schedule fasting labs, urine test & a follow-up appointment (in-person or e-visit --whatever pt's preference is) with Dr. Guzman one week after labs per correspondence.     Raya Barkley    [facility]  Office: [phone]  Fax [phone]

## 2020-08-25 NOTE — TELEPHONE ENCOUNTER
August 25, 2020    Patient is scheduled for fasting labs and UA on 9/14/2020 and a [facility] TELEMEDICINE appointment on 9/21/2020 with Dr. uGzman.     Raya Barkley    [facility]  Office: [phone]  Fax [phone]

## 2020-09-14 DIAGNOSIS — R94.6 BORDERLINE ABNORMAL TFTS: ICD-10-CM

## 2020-09-14 DIAGNOSIS — E78.5 HYPERLIPIDEMIA LDL GOAL <100: ICD-10-CM

## 2020-09-14 DIAGNOSIS — R73.01 IFG (IMPAIRED FASTING GLUCOSE): ICD-10-CM

## 2020-09-14 LAB
ANION GAP SERPL CALCULATED.3IONS-SCNC: 5 MMOL/L (ref 3–14)
BUN SERPL-MCNC: 21 MG/DL (ref 7–30)
CALCIUM SERPL-MCNC: 9.2 MG/DL (ref 8.5–10.1)
CHLORIDE SERPL-SCNC: 106 MMOL/L (ref 94–109)
CHOLEST SERPL-MCNC: 138 MG/DL
CO2 SERPL-SCNC: 25 MMOL/L (ref 20–32)
CREAT SERPL-MCNC: 0.8 MG/DL (ref 0.52–1.04)
GFR SERPL CREATININE-BSD FRML MDRD: 65 ML/MIN/{1.73_M2}
GLUCOSE SERPL-MCNC: 107 MG/DL (ref 70–99)
HBA1C MFR BLD: 5.3 % (ref 0–5.6)
HDLC SERPL-MCNC: 65 MG/DL
LDLC SERPL CALC-MCNC: 62 MG/DL
NONHDLC SERPL-MCNC: 73 MG/DL
POTASSIUM SERPL-SCNC: 4.2 MMOL/L (ref 3.4–5.3)
SODIUM SERPL-SCNC: 136 MMOL/L (ref 133–144)
T3FREE SERPL-MCNC: 2.9 PG/ML (ref 2.3–4.2)
T4 FREE SERPL-MCNC: 1.12 NG/DL (ref 0.76–1.46)
TRIGL SERPL-MCNC: 55 MG/DL
TSH SERPL DL<=0.005 MIU/L-ACNC: 1.68 MU/L (ref 0.4–4)

## 2020-09-14 PROCEDURE — 80061 LIPID PANEL: CPT | Performed by: INTERNAL MEDICINE

## 2020-09-14 PROCEDURE — 84439 ASSAY OF FREE THYROXINE: CPT | Performed by: INTERNAL MEDICINE

## 2020-09-14 PROCEDURE — 36415 COLL VENOUS BLD VENIPUNCTURE: CPT | Performed by: INTERNAL MEDICINE

## 2020-09-14 PROCEDURE — 84443 ASSAY THYROID STIM HORMONE: CPT | Performed by: INTERNAL MEDICINE

## 2020-09-14 PROCEDURE — 84481 FREE ASSAY (FT-3): CPT | Performed by: INTERNAL MEDICINE

## 2020-09-14 PROCEDURE — 80048 BASIC METABOLIC PNL TOTAL CA: CPT | Performed by: INTERNAL MEDICINE

## 2020-09-14 PROCEDURE — 83036 HEMOGLOBIN GLYCOSYLATED A1C: CPT | Performed by: INTERNAL MEDICINE

## 2020-09-21 ENCOUNTER — VIRTUAL VISIT (OUTPATIENT)
Dept: OTHER | Facility: CLINIC | Age: 85
End: 2020-09-21
Attending: INTERNAL MEDICINE
Payer: MEDICARE

## 2020-09-21 DIAGNOSIS — R94.6 BORDERLINE ABNORMAL TFTS: ICD-10-CM

## 2020-09-21 DIAGNOSIS — I10 ESSENTIAL HYPERTENSION WITH GOAL BLOOD PRESSURE LESS THAN 130/80: ICD-10-CM

## 2020-09-21 DIAGNOSIS — E78.5 HYPERLIPIDEMIA LDL GOAL <100: ICD-10-CM

## 2020-09-21 DIAGNOSIS — R73.01 IFG (IMPAIRED FASTING GLUCOSE): ICD-10-CM

## 2020-09-21 PROCEDURE — 99443 ZZC PHYSICIAN TELEPHONE EVALUATION 21-30 MIN: CPT | Performed by: INTERNAL MEDICINE

## 2020-09-21 NOTE — PROGRESS NOTES
Lori Fall is a 89 year old female who is presenting at the current time to discuss her diagnosi(es) of        Essential hypertension with goal blood pressure less than 130/80  Hyperlipidemia LDL goal <100  Borderline abnormal TFTs  IFG (impaired fasting glucose) .         HPI: the patient is a hypertensive, hyperlipidemic, IFG patient presenting for lab f/u.    Review Of Systems  Skin: negative  Eyes: negative  Ears/Nose/Throat: negative  Respiratory: No shortness of breath, dyspnea on exertion, cough, or hemoptysis  Cardiovascular: negative  Gastrointestinal: negative  Genitourinary: negative  Musculoskeletal: negative  Neurologic: negative  Psychiatric: negative  Hematologic/Lymphatic/Immunologic: negative  Endocrine: negative       PAST MEDICAL HISTORY:                  Past Medical History:   Diagnosis Date     Essential hypertension, benign     abstracted 7/5/02     Hyperlipidemia LDL goal < 100      Impaired fasting glucose      NONSPECIFIC MEDICAL HISTORY     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     Osteopenia      Primary thrombocytopenia 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     PUD (peptic ulcer disease) 1960s     Pure hypercholesterolemia     abstracted 7/5/02       PAST SURGICAL HISTORY:                  Past Surgical History:   Procedure Laterality Date     C NONSPECIFIC PROCEDURE  1995    splenectomy -- abstracted 7/5/02     C NONSPECIFIC PROCEDURE  1977    [facility]  BSO appendectomy     C NONSPECIFIC PROCEDURE  1994    benign breast bopsy     C NONSPECIFIC PROCEDURE  6/28/2010    Left total hip arthroplasty, Bijal uncemented components     ESOPHAGOSCOPY, GASTROSCOPY, DUODENOSCOPY (EGD), COMBINED N/A 5/7/2017    Procedure: COMBINED ESOPHAGOSCOPY, GASTROSCOPY, DUODENOSCOPY (EGD), BIOPSY SINGLE OR MULTIPLE;  EGD;  Surgeon: Guero Olmedo MD;  Location:  GI     TONSILLECTOMY & ADENOIDECTOMY  1956       CURRENT MEDICATIONS:                  Current Outpatient Medications   Medication  Sig Dispense Refill     alendronate (FOSAMAX) 35 MG tablet Take 1 tablet (35 mg) by mouth once a week 13 tablet 3     amLODIPine (NORVASC) 10 MG tablet Take 1 tablet (10 mg) by mouth daily 90 tablet 3     Calcium Carbonate-Vitamin D (CALCIUM + D) 600-200 MG-UNIT per tablet Take 2 tablets by mouth 2 times daily.       ezetimibe (ZETIA) 10 MG tablet Take 1 tablet (10 mg) by mouth At Bedtime 90 tablet 3     fenofibrate (TRIGLIDE/LOFIBRA) 160 MG tablet Take 1 tablet (160 mg) by mouth daily 90 tablet 3     lisinopril (ZESTRIL) 40 MG tablet Take 1 tablet (40 mg) by mouth daily 90 tablet 3     metoprolol tartrate (LOPRESSOR) 100 MG tablet Take 1 tablet (100 mg) by mouth 2 times daily 180 tablet 3     omeprazole (PRILOSEC) 40 MG DR capsule Take 1 capsule (40 mg) by mouth 2 times daily Take 30-60 minutes before a meal. 180 capsule 3     simvastatin (ZOCOR) 40 MG tablet Take 1 tablet (40 mg) by mouth At Bedtime 90 tablet 3       ALLERGIES:                  Allergies   Allergen Reactions     Aminoglycosides      neosporin onintment - rash     Hctz Hives     Penicillins      Sulfa Drugs        SOCIAL HISTORY:                  Social History     Socioeconomic History     Marital status:      Spouse name: Nhan     Number of children: 1     Years of education: 12     Highest education level: Not on file   Occupational History     Occupation: retired     Comment: retired      Employer: RETIRED   Social Needs     Financial resource strain: Not on file     Food insecurity     Worry: Not on file     Inability: Not on file     Transportation needs     Medical: Not on file     Non-medical: Not on file   Tobacco Use     Smoking status: Never Smoker     Smokeless tobacco: Never Used   Substance and Sexual Activity     Alcohol use: No     Alcohol/week: 0.0 standard drinks     Frequency: Never     Comment: occasional wine     Drug use: No     Sexual activity: Yes     Partners: Male   Lifestyle     Physical activity      Days per week: Not on file     Minutes per session: Not on file     Stress: Not on file   Relationships     Social connections     Talks on phone: Not on file     Gets together: Not on file     Attends Yazdanism service: Not on file     Active member of club or organization: Not on file     Attends meetings of clubs or organizations: Not on file     Relationship status: Not on file     Intimate partner violence     Fear of current or ex partner: Not on file     Emotionally abused: Not on file     Physically abused: Not on file     Forced sexual activity: Not on file   Other Topics Concern     Parent/sibling w/ CABG, MI or angioplasty before 65F 55M? Not Asked   Social History Narrative     Not on file       FAMILY HISTORY:                   Family History   Problem Relation Age of Onset     Heart Disease Mother         d:age 66     Heart Disease Father         d:age 62     Family History Negative Brother         d:age 35  in war     Cancer Brother         d:age 53 cancer esophagus, alcoholic     Heart Disease Brother         b:1933 bypass surgery           Physical exam was not performed as it was a billable telephone encounter.    Component      Latest Ref Rng & Units 2020   Sodium      133 - 144 mmol/L 136   Potassium      3.4 - 5.3 mmol/L 4.2   Chloride      94 - 109 mmol/L 106   Carbon Dioxide      20 - 32 mmol/L 25   Anion Gap      3 - 14 mmol/L 5   Glucose      70 - 99 mg/dL 107 (H)   Urea Nitrogen      7 - 30 mg/dL 21   Creatinine      0.52 - 1.04 mg/dL 0.80   GFR Estimate      >60 mL/min/1.73:m2 65   GFR Estimate If Black      >60 mL/min/1.73:m2 75   Calcium      8.5 - 10.1 mg/dL 9.2   Cholesterol      <200 mg/dL 138   Triglycerides      <150 mg/dL 55   HDL Cholesterol      >49 mg/dL 65   LDL Cholesterol Calculated      <100 mg/dL 62   Non HDL Cholesterol      <130 mg/dL 73   Hemoglobin A1C      0 - 5.6 % 5.3   TSH      0.40 - 4.00 mU/L 1.68   Free T3      2.3 - 4.2 pg/mL 2.9   T4 Free      0.76 -  1.46 ng/dL 1.12       A/P:    (I10) Essential hypertension with goal blood pressure less than 130/80  Comment: at goal at home  Plan: Basic metabolic panel            (E78.5) Hyperlipidemia LDL goal <100  Comment: at goal  Plan: CBC with platelets differential, Hepatic panel,        Lipid Profile            (R94.6) Borderline abnormal TFTs  Comment: resolved  Plan: T3 Free, T4 free, TSH            (R73.01) IFG (impaired fasting glucose)  Comment: avoid CHO  Plan: Basic metabolic panel, Hemoglobin A1c            Greater than one half the twenty five minutes total spent on the pt's visit were spent providing education and counselling to the patient regarding the above matters.          The patient has been advised my practice is changing to a purely consultative Vascualr Medicine practice. As such they are advised they will need to find a new primary care provider.  I will continue to provide care for vascular risk factors including diabetes, hypertension blood, cholesterol management, aneurysmal , or thrombosis related issues.    Patient understands the need to establish care with a new provider for routine screenings, ill visits, referrals, and other non-vascular health care issues.

## 2020-09-21 NOTE — PROGRESS NOTES
"Lori Fall is a 89 year old female who is being evaluated via a billable telephone visit.      The patient has been notified of following:     \"This telephone visit will be conducted via a call between you and your physician/provider. We have found that certain health care needs can be provided without the need for a physical exam.  This service lets us provide the care you need with a short phone conversation.  If a prescription is necessary we can send it directly to your pharmacy.  If lab work is needed we can place an order for that and you can then stop by our lab to have the test done at a later time.    Telephone visits are billed at different rates depending on your insurance coverage. During this emergency period, for some insurers they may be billed the same as an in-person visit.  Please reach out to your insurance provider with any questions.    If during the course of the call the physician/provider feels a telephone visit is not appropriate, you will not be charged for this service.\"    Patient has given verbal consent for Telephone visit? Yes     What phone number would you like to be contacted at? Home phone number [phone]    How would you like to obtain your AVS? Mailed   "

## 2021-02-16 ENCOUNTER — IMMUNIZATION (OUTPATIENT)
Dept: NURSING | Facility: CLINIC | Age: 86
End: 2021-02-16
Payer: MEDICARE

## 2021-02-16 PROCEDURE — 0001A PR COVID VAC PFIZER DIL RECON 30 MCG/0.3 ML IM: CPT

## 2021-02-16 PROCEDURE — 91300 PR COVID VAC PFIZER DIL RECON 30 MCG/0.3 ML IM: CPT

## 2021-02-27 DIAGNOSIS — I10 ESSENTIAL HYPERTENSION WITH GOAL BLOOD PRESSURE LESS THAN 130/80: ICD-10-CM

## 2021-03-01 RX ORDER — METOPROLOL TARTRATE 100 MG
TABLET ORAL
Qty: 180 TABLET | Refills: 2 | Status: SHIPPED | OUTPATIENT
Start: 2021-03-01 | End: 2021-04-09

## 2021-03-01 NOTE — TELEPHONE ENCOUNTER
metoprolol tartrate (LOPRESSOR) 100 MG tablet  Last Written Prescription Date:  5/13/20  Last Fill Quantity: 180,  # refills: 3     Last office visit: 9/21/20  Follow up due:  March 2021    [place] to [place] [facility] [place].  Medication and pharmacy loaded.      Beta-Blockers Protocol [id number]/27/2021 06:10 AM   Blood pressure under 140/90 in past 12 months       Judit Lambert RN BSN  [facility]  [phone]

## 2021-03-09 ENCOUNTER — IMMUNIZATION (OUTPATIENT)
Dept: NURSING | Facility: CLINIC | Age: 86
End: 2021-03-09
Attending: INTERNAL MEDICINE
Payer: MEDICARE

## 2021-03-09 PROCEDURE — 0002A PR COVID VAC PFIZER DIL RECON 30 MCG/0.3 ML IM: CPT

## 2021-03-09 PROCEDURE — 91300 PR COVID VAC PFIZER DIL RECON 30 MCG/0.3 ML IM: CPT

## 2021-03-21 ENCOUNTER — HEALTH MAINTENANCE LETTER (OUTPATIENT)
Age: 86
End: 2021-03-21

## 2021-03-29 DIAGNOSIS — R73.01 IFG (IMPAIRED FASTING GLUCOSE): ICD-10-CM

## 2021-03-29 DIAGNOSIS — I10 ESSENTIAL HYPERTENSION WITH GOAL BLOOD PRESSURE LESS THAN 130/80: ICD-10-CM

## 2021-03-29 DIAGNOSIS — E78.5 HYPERLIPIDEMIA LDL GOAL <100: ICD-10-CM

## 2021-03-29 DIAGNOSIS — R94.6 BORDERLINE ABNORMAL TFTS: ICD-10-CM

## 2021-03-29 LAB
ALBUMIN SERPL-MCNC: 3.7 G/DL (ref 3.4–5)
ALP SERPL-CCNC: 59 U/L (ref 40–150)
ALT SERPL W P-5'-P-CCNC: 24 U/L (ref 0–50)
ANION GAP SERPL CALCULATED.3IONS-SCNC: 3 MMOL/L (ref 3–14)
AST SERPL W P-5'-P-CCNC: 28 U/L (ref 0–45)
BASOPHILS # BLD AUTO: 0 10E9/L (ref 0–0.2)
BASOPHILS NFR BLD AUTO: 0.4 %
BILIRUB DIRECT SERPL-MCNC: 0.2 MG/DL (ref 0–0.2)
BILIRUB SERPL-MCNC: 0.6 MG/DL (ref 0.2–1.3)
BUN SERPL-MCNC: 23 MG/DL (ref 7–30)
CALCIUM SERPL-MCNC: 9.7 MG/DL (ref 8.5–10.1)
CHLORIDE SERPL-SCNC: 108 MMOL/L (ref 94–109)
CHOLEST SERPL-MCNC: 144 MG/DL
CO2 SERPL-SCNC: 27 MMOL/L (ref 20–32)
CREAT SERPL-MCNC: 0.76 MG/DL (ref 0.52–1.04)
DIFFERENTIAL METHOD BLD: ABNORMAL
EOSINOPHIL # BLD AUTO: 0 10E9/L (ref 0–0.7)
EOSINOPHIL NFR BLD AUTO: 0.6 %
ERYTHROCYTE [DISTWIDTH] IN BLOOD BY AUTOMATED COUNT: 15.4 % (ref 10–15)
GFR SERPL CREATININE-BSD FRML MDRD: 69 ML/MIN/{1.73_M2}
GLUCOSE SERPL-MCNC: 113 MG/DL (ref 70–99)
HBA1C MFR BLD: 5.5 % (ref 0–5.6)
HCT VFR BLD AUTO: 41.3 % (ref 35–47)
HDLC SERPL-MCNC: 68 MG/DL
HGB BLD-MCNC: 13.3 G/DL (ref 11.7–15.7)
LDLC SERPL CALC-MCNC: 65 MG/DL
LYMPHOCYTES # BLD AUTO: 1.6 10E9/L (ref 0.8–5.3)
LYMPHOCYTES NFR BLD AUTO: 33.1 %
MCH RBC QN AUTO: 30.6 PG (ref 26.5–33)
MCHC RBC AUTO-ENTMCNC: 32.2 G/DL (ref 31.5–36.5)
MCV RBC AUTO: 95 FL (ref 78–100)
MONOCYTES # BLD AUTO: 0.8 10E9/L (ref 0–1.3)
MONOCYTES NFR BLD AUTO: 15.2 %
NEUTROPHILS # BLD AUTO: 2.5 10E9/L (ref 1.6–8.3)
NEUTROPHILS NFR BLD AUTO: 50.7 %
NONHDLC SERPL-MCNC: 76 MG/DL
PLATELET # BLD AUTO: 257 10E9/L (ref 150–450)
POTASSIUM SERPL-SCNC: 4.2 MMOL/L (ref 3.4–5.3)
PROT SERPL-MCNC: 7.2 G/DL (ref 6.8–8.8)
RBC # BLD AUTO: 4.34 10E12/L (ref 3.8–5.2)
SODIUM SERPL-SCNC: 138 MMOL/L (ref 133–144)
T3FREE SERPL-MCNC: 2.8 PG/ML (ref 2.3–4.2)
T4 FREE SERPL-MCNC: 1.03 NG/DL (ref 0.76–1.46)
TRIGL SERPL-MCNC: 53 MG/DL
TSH SERPL DL<=0.005 MIU/L-ACNC: 1.96 MU/L (ref 0.4–4)
WBC # BLD AUTO: 5 10E9/L (ref 4–11)

## 2021-03-29 PROCEDURE — 80048 BASIC METABOLIC PNL TOTAL CA: CPT | Performed by: INTERNAL MEDICINE

## 2021-03-29 PROCEDURE — 80061 LIPID PANEL: CPT | Performed by: INTERNAL MEDICINE

## 2021-03-29 PROCEDURE — 80076 HEPATIC FUNCTION PANEL: CPT | Performed by: INTERNAL MEDICINE

## 2021-03-29 PROCEDURE — 83036 HEMOGLOBIN GLYCOSYLATED A1C: CPT | Performed by: INTERNAL MEDICINE

## 2021-03-29 PROCEDURE — 84443 ASSAY THYROID STIM HORMONE: CPT | Performed by: INTERNAL MEDICINE

## 2021-03-29 PROCEDURE — 36415 COLL VENOUS BLD VENIPUNCTURE: CPT | Performed by: INTERNAL MEDICINE

## 2021-03-29 PROCEDURE — 84439 ASSAY OF FREE THYROXINE: CPT | Performed by: INTERNAL MEDICINE

## 2021-03-29 PROCEDURE — 85025 COMPLETE CBC W/AUTO DIFF WBC: CPT | Performed by: INTERNAL MEDICINE

## 2021-03-29 PROCEDURE — 84481 FREE ASSAY (FT-3): CPT | Performed by: INTERNAL MEDICINE

## 2021-04-09 ENCOUNTER — OFFICE VISIT (OUTPATIENT)
Dept: OTHER | Facility: CLINIC | Age: 86
End: 2021-04-09
Attending: INTERNAL MEDICINE
Payer: MEDICARE

## 2021-04-09 VITALS
SYSTOLIC BLOOD PRESSURE: 128 MMHG | HEIGHT: 64 IN | DIASTOLIC BLOOD PRESSURE: 78 MMHG | WEIGHT: 121 LBS | BODY MASS INDEX: 20.66 KG/M2 | RESPIRATION RATE: 16 BRPM | OXYGEN SATURATION: 97 % | HEART RATE: 68 BPM

## 2021-04-09 DIAGNOSIS — Z00.00 MEDICARE ANNUAL WELLNESS VISIT, SUBSEQUENT: Primary | ICD-10-CM

## 2021-04-09 DIAGNOSIS — R94.6 BORDERLINE ABNORMAL TFTS: ICD-10-CM

## 2021-04-09 DIAGNOSIS — I10 ESSENTIAL HYPERTENSION WITH GOAL BLOOD PRESSURE LESS THAN 130/80: ICD-10-CM

## 2021-04-09 DIAGNOSIS — K25.4 GASTROINTESTINAL HEMORRHAGE ASSOCIATED WITH GASTRIC ULCER: ICD-10-CM

## 2021-04-09 DIAGNOSIS — E78.5 HYPERLIPIDEMIA LDL GOAL <100: ICD-10-CM

## 2021-04-09 DIAGNOSIS — R73.01 IFG (IMPAIRED FASTING GLUCOSE): ICD-10-CM

## 2021-04-09 DIAGNOSIS — M85.80 OSTEOPENIA, UNSPECIFIED LOCATION: ICD-10-CM

## 2021-04-09 PROCEDURE — G0439 PPPS, SUBSEQ VISIT: HCPCS | Performed by: INTERNAL MEDICINE

## 2021-04-09 PROCEDURE — 99213 OFFICE O/P EST LOW 20 MIN: CPT | Mod: 25 | Performed by: INTERNAL MEDICINE

## 2021-04-09 PROCEDURE — G0463 HOSPITAL OUTPT CLINIC VISIT: HCPCS

## 2021-04-09 RX ORDER — ALENDRONATE SODIUM 35 MG/1
35 TABLET ORAL WEEKLY
Qty: 13 TABLET | Refills: 3 | Status: SHIPPED | OUTPATIENT
Start: 2021-04-09 | End: 2022-03-14

## 2021-04-09 RX ORDER — OMEPRAZOLE 40 MG/1
40 CAPSULE, DELAYED RELEASE ORAL 2 TIMES DAILY
Qty: 180 CAPSULE | Refills: 3 | Status: SHIPPED | OUTPATIENT
Start: 2021-04-09 | End: 2022-03-14

## 2021-04-09 RX ORDER — EZETIMIBE 10 MG/1
10 TABLET ORAL AT BEDTIME
Qty: 90 TABLET | Refills: 3 | Status: SHIPPED | OUTPATIENT
Start: 2021-04-09 | End: 2022-03-14

## 2021-04-09 RX ORDER — SIMVASTATIN 40 MG
40 TABLET ORAL AT BEDTIME
Qty: 90 TABLET | Refills: 3 | Status: SHIPPED | OUTPATIENT
Start: 2021-04-09 | End: 2022-03-14

## 2021-04-09 RX ORDER — METOPROLOL TARTRATE 100 MG
TABLET ORAL
Qty: 180 TABLET | Refills: 2 | Status: SHIPPED | OUTPATIENT
Start: 2021-04-09 | End: 2021-11-29

## 2021-04-09 RX ORDER — AMLODIPINE BESYLATE 10 MG/1
10 TABLET ORAL DAILY
Qty: 90 TABLET | Refills: 3 | Status: SHIPPED | OUTPATIENT
Start: 2021-04-09 | End: 2022-03-14

## 2021-04-09 RX ORDER — LISINOPRIL 40 MG/1
40 TABLET ORAL DAILY
Qty: 90 TABLET | Refills: 3 | Status: SHIPPED | OUTPATIENT
Start: 2021-04-09 | End: 2022-03-14

## 2021-04-09 RX ORDER — FENOFIBRATE 160 MG/1
160 TABLET ORAL DAILY
Qty: 90 TABLET | Refills: 3 | Status: SHIPPED | OUTPATIENT
Start: 2021-04-09 | End: 2022-03-14

## 2021-04-09 ASSESSMENT — MIFFLIN-ST. JEOR: SCORE: 948.85

## 2021-04-09 NOTE — PROGRESS NOTES
Lori Fall is a 89 year old female who presents for        Medicare annual wellness visit, subsequent  Essential hypertension with goal blood pressure less than 130/80  Hyperlipidemia LDL goal <100  Borderline abnormal TFTs  IFG (impaired fasting glucose)  Osteopenia, unspecified location  Gastrointestinal hemorrhage associated with gastric ulcer     HPI: the patient is a hypertensive, hyperlipidemic, IFG patient presenting for annual physical and  lab f/u.      Review Of Systems  Skin: negative  Eyes: negative  Ears/Nose/Throat: negative  Respiratory: No shortness of breath, dyspnea on exertion, cough, or hemoptysis  Cardiovascular: negative  Gastrointestinal: negative  Genitourinary: negative  Musculoskeletal: negative  Neurologic: negative  Psychiatric: negative  Hematologic/Lymphatic/Immunologic: negative  Endocrine: negative     Current providers caring for this patient include:  Patient Care Team:  No Ref-Primary, Physician as PCP - Alcides Schmitt MD as Assigned Heart and Vascular Provider    Complete Medical and Social history reviewed with patient, outlined below.    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     PUD (peptic ulcer disease) 1960s     Pure hypercholesterolemia     abstracted 7/5/02       Past Surgical History:   Procedure Laterality Date     ESOPHAGOSCOPY, GASTROSCOPY, DUODENOSCOPY (EGD), COMBINED N/A 5/7/2017    Procedure: COMBINED ESOPHAGOSCOPY, GASTROSCOPY, DUODENOSCOPY (EGD), BIOPSY SINGLE OR MULTIPLE;  EGD;  Surgeon: Guero Olmedo MD;  Location:   "GI     TONSILLECTOMY & ADENOIDECTOMY       [facility] NONSPECIFIC PROCEDURE      splenectomy -- abstracted 02     [facility] NONSPECIFIC PROCEDURE      [facility]  BSO appendectomy     [facility] NONSPECIFIC PROCEDURE      benign breast bopsy     [facility] NONSPECIFIC PROCEDURE  2010    Left total hip arthroplasty, Bijal uncemented components       Family History   Problem Relation Age of Onset     Heart Disease Mother         d:age 66     Heart Disease Father         d:age 62     Family History Negative Brother         d:age 35  in war     Cancer Brother         d:age 53 cancer esophagus, alcoholic     Heart Disease Brother         b:1933 bypass surgery       Social History     Tobacco Use     Smoking status: Never Smoker     Smokeless tobacco: Never Used   Substance Use Topics     Alcohol use: No     Alcohol/week: 0.0 standard drinks     Frequency: Never     Comment: occasional wine       Diet: regular, low salt/low fat  Physical Activity: active without specific exercise program  Depression Screen:    Over the past 2 weeks, patient has felt down, depressed, or hopeless:  No    Over the past 2 weeks, patient has felt little interest or pleasure in doing things: No    Functional ability/Safety screen:  Up and go test (able to get up and walk longer than 30 seconds): Passed  Patient needs assistance with: nothing  Patient's home has the following possible safety concerns: none identified  Patient has concerns about her hearing:  No  Cognitive Screen  Patient repeats three objects (ball, flag, tree)      Clock drawing test:   NORMAL  Recalls three objects after 3 minutes (ball,flag,tree):                                                                                               recalls 3 objects (3 points)    Physical Exam:  BP (!) 162/78 (BP Location: Left arm, Patient Position: Chair, Cuff Size: Adult Regular)   Pulse 68   Resp 16   Ht 1.626 m (5' 4\")   Wt 54.9 kg (121 lb)   LMP  (LMP Unknown)   SpO2 97%   " BMI 20.77 kg/m     Body mass index is 20.77 kg/m .           GENERAL APPEARANCE: healthy, alert and no distress  PSYCH: Affect normal/bright.  Mentation within normal limits.  EYES: conjunctiva clear  HENT: ear canals and TM's normal.  Nose and mouth without ulcers, erythema or lesions  NECK: supple, nontender, no lymphadenopathy  RESP: lungs clear to auscultation - no rales, rhonchi or wheezes  CV: regular rates and rhythm, normal S1 S2, no murmur noted  ABDOMEN:  soft, nontender, no HSM or masses and bowel sounds normal  SKIN: no suspicious lesions or rashes  NEURO: Normal strength and tone,  normal speech and mentation  Extremities: no peripheral edema or tenderness, peripheral pulses normal  MS: extremities normal- no gross deformities noted, no erythema, FROM noted in all extremities  PSYCH: mentation appears normal  LYMPHATICS: no cervical adenopathy      Component      Latest Ref Rng & Units 3/29/2021   WBC      4.0 - 11.0 10e9/L 5.0   RBC Count      3.8 - 5.2 10e12/L 4.34   Hemoglobin      11.7 - 15.7 g/dL 13.3   Hematocrit      35.0 - 47.0 % 41.3   MCV      78 - 100 fl 95   MCH      26.5 - 33.0 pg 30.6   MCHC      31.5 - 36.5 g/dL 32.2   RDW      10.0 - 15.0 % 15.4 (H)   Platelet Count      150 - 450 10e9/L 257   % Neutrophils      % 50.7   % Lymphocytes      % 33.1   % Monocytes      % 15.2   % Eosinophils      % 0.6   % Basophils      % 0.4   Absolute Neutrophil      1.6 - 8.3 10e9/L 2.5   Absolute Lymphocytes      0.8 - 5.3 10e9/L 1.6   Absolute Monocytes      0.0 - 1.3 10e9/L 0.8   Absolute Eosinophils      0.0 - 0.7 10e9/L 0.0   Absolute Basophils      0.0 - 0.2 10e9/L 0.0   Diff Method       Automated Method   Sodium      133 - 144 mmol/L 138   Potassium      3.4 - 5.3 mmol/L 4.2   Chloride      94 - 109 mmol/L 108   Carbon Dioxide      20 - 32 mmol/L 27   Anion Gap      3 - 14 mmol/L 3   Glucose      70 - 99 mg/dL 113 (H)   Urea Nitrogen      7 - 30 mg/dL 23   Creatinine      0.52 - 1.04 mg/dL 0.76    GFR Estimate      >60 mL/min/1.73:m2 69   GFR Estimate If Black      >60 mL/min/1.73:m2 80   Calcium      8.5 - 10.1 mg/dL 9.7   Bilirubin Direct      0.0 - 0.2 mg/dL 0.2   Bilirubin Total      0.2 - 1.3 mg/dL 0.6   Albumin      3.4 - 5.0 g/dL 3.7   Protein Total      6.8 - 8.8 g/dL 7.2   Alkaline Phosphatase      40 - 150 U/L 59   ALT      0 - 50 U/L 24   AST      0 - 45 U/L 28   Cholesterol      <200 mg/dL 144   Triglycerides      <150 mg/dL 53   HDL Cholesterol      >49 mg/dL 68   LDL Cholesterol Calculated      <100 mg/dL 65   Non HDL Cholesterol      <130 mg/dL 76   Free T3      2.3 - 4.2 pg/mL 2.8   T4 Free      0.76 - 1.46 ng/dL 1.03   TSH      0.40 - 4.00 mU/L 1.96   Hemoglobin A1C      0 - 5.6 % 5.5       End of Life Planning:   Patient currently has an advanced directive: Yes.  Practitioner is supportive of decision.    Education/Counseling:   Based on review of the above information, the following items were addressed:      Elevated blood pressure - follow-up plans made    Appropriate preventive services were discussed with this patient, including applicable screening as appropriate for cardiovascular disease, diabetes, osteopenia/osteoporosis, and glaucoma.  As appropriate for age/gender, discussed screening for colorectal cancer, prostate cancer, breast cancer, and cervical cancer.   Checklist reviewing preventive services available has been given to the patient.    A/P:     (Z00.00) Medicare annual wellness visit, subsequent  (primary encounter diagnosis)  Comment: doing well, RTC one year  Plan: Follow-Up with Vascular Medicine, CBC with         platelets differential, T3 Free, T4 free, TSH,         Basic metabolic panel, Hepatic panel, Lipid         Profile           (I10) Essential hypertension with goal blood pressure less than 130/80  Comment: at goal  Plan: OFFICE/OUTPT VISIT,EST,LEVL III, amLODIPine         (NORVASC) 10 MG tablet, lisinopril (ZESTRIL) 40        MG tablet, metoprolol tartrate  (LOPRESSOR) 100         MG tablet, Follow-Up with Vascular Medicine           (E78.5) Hyperlipidemia LDL goal <100  Comment: at gtoal  Plan: OFFICE/OUTPT VISIT,EST,LEVL III, ezetimibe         (ZETIA) 10 MG tablet, fenofibrate         (TRIGLIDE/LOFIBRA) 160 MG tablet, simvastatin         (ZOCOR) 40 MG tablet, Follow-Up with Vascular         Medicine, Lipid Profile           (R94.6) Borderline abnormal TFTs  Comment: normal  Plan: OFFICE/OUTPT VISIT,EST,LEVL III, Follow-Up with        Vascular Medicine, T3 Free, T4 free, TSH           (R73.01) IFG (impaired fasting glucose)  Comment: avoid CHO  Plan: OFFICE/OUTPT VISIT,EST,LEVL III, Follow-Up with        Vascular Medicine, Hemoglobin A1c, Albumin         Random Urine Quantitative with Creat Ratio           (M85.80) Osteopenia, unspecified location  Comment: needs the below  Plan: alendronate (FOSAMAX) 35 MG tablet, Follow-Up         with Vascular Medicine           (K25.4) Gastrointestinal hemorrhage associated with gastric ulcer  Comment: needs the below  Plan: omeprazole (PRILOSEC) 40 MG DR capsule,         Follow-Up with Vascular Medicine, CBC with         platelets differential               Greater than one half the 15 minutes not spent on preventive care during this visit was spent providing aducation and counselling regarding item(s) # 2 onward as delineated above.

## 2021-04-09 NOTE — PROGRESS NOTES
"Lori Fall is a 89 year old female who presents for:  Chief Complaint   Patient presents with     RECHECK        F/u to 9/21/20. Fasting labs 3/29/2021*nh        Vitals:    Vitals:    04/09/21 0955 04/09/21 0957   BP: (!) 158/78 (!) 162/78   BP Location: Right arm Left arm   Patient Position: Chair Chair   Cuff Size: Adult Regular Adult Regular   Pulse: 68    Resp: 16    SpO2: 97%    Weight: 121 lb (54.9 kg)    Height: 5' 4\" (1.626 m)        BMI:  Estimated body mass index is 20.77 kg/m  as calculated from the following:    Height as of this encounter: 5' 4\" (1.626 m).    Weight as of this encounter: 121 lb (54.9 kg).    Pain Score:  Data Unavailable        Helena Jane CMA    "

## 2021-09-05 ENCOUNTER — HEALTH MAINTENANCE LETTER (OUTPATIENT)
Age: 86
End: 2021-09-05

## 2021-11-28 DIAGNOSIS — I10 ESSENTIAL HYPERTENSION WITH GOAL BLOOD PRESSURE LESS THAN 130/80: ICD-10-CM

## 2021-11-29 RX ORDER — METOPROLOL TARTRATE 100 MG
TABLET ORAL
Qty: 180 TABLET | Refills: 1 | Status: SHIPPED | OUTPATIENT
Start: 2021-11-29 | End: 2022-03-14

## 2021-11-29 NOTE — TELEPHONE ENCOUNTER
"Last Written Prescription Date:    Last Fill Quantity: 180,  # refills: 1   Last office visit: 4/9/2021 with prescribing provider:     Future Office Visit:      Requested Prescriptions   Signed Prescriptions Disp Refills    metoprolol tartrate (LOPRESSOR) 100 MG tablet 180 tablet 1     Sig: TAKE 1 TABLET(100 MG) BY MOUTH TWICE DAILY       Beta-Blockers Protocol Passed - 11/28/2021  3:44 AM        Passed - Blood pressure under 140/90 in past 12 months     BP Readings from Last 3 Encounters:   04/09/21 128/78   03/05/20 (!) 150/70   04/03/19 118/58                 Passed - Patient is age 6 or older        Passed - Recent (12 mo) or future (30 days) visit within the authorizing provider's specialty     Patient has had an office visit with the authorizing provider or a provider within the authorizing providers department within the previous 12 mos or has a future within next 30 days. See \"Patient Info\" tab in inbasket, or \"Choose Columns\" in Meds & Orders section of the refill encounter.              Passed - Medication is active on med list             Prescription approved per [facility] Refill Protocol.    Marielena GALVAN, RN    [facility]  Office: [phone]  Fax: [phone]        "

## 2021-11-29 NOTE — PROGRESS NOTES
Care Transition Initial Assessment - RN    Reason For Consult: discharge planning, transportation   Met with: Patient.    DATA   Active Problems:    GI bleed       Cognitive Status: awake, alert and oriented.  Primary Care Clinic Name:  Vascular clinic  Primary Care MD Name: Dr Guzman  Contact information and PCP information verified: Yes    Lives With: spouse, other (see comments) (theodore is currently in [facility] after recent stroke)  Living Arrangements: house  Quality Of Family Relationships: supportive, helpful, involved  Description of Support System: Supportive, Involved   Who is your support system?: Other (specify) (nieces and nephews)   Support Assessment: Adequate family and caregiver support, Adequate social supports     Insurance concerns: No Insurance issues identified    ASSESSMENT AND INTERVENTIONS  Patient currently receives the following services:  none        Identified issues/concerns regarding health management: Pt's  is getting rehab for a stroke at [facility] and is supposed to be discharged home this week.  Pt feels completely back to her baseline but feels a little overwhelmed with changes in her medications and managing her 's stroke care when he is dc'd.  She is alert and oriented and able to state her med changes but she is just a little anxious about all the changes in the couple's life lately.  She is getting everything in writing and bedside RN will go over everything with her niece as well.  She is not home bound and does not qualify for home RN.    Contacted [facility] and informed them that this is a concern of hers.  They will make sure to explain everything carefully and assess for any home needs.    Sent hand off to Dr Guzman as there is not a care coordinator in the vascular clinic.    PLAN  Financial costs for the patient include n/a .  Patient given options and choices for discharge. yes .  Patient/family is agreeable to the plan?  Yes  Patient anticipates discharging to home w/  assist from nieces and nephews .        Patient anticipates needs for home equipment: No    Plan/Disposition: Home   Appointments: Wed 5/10 10:30 am w/ Dr Guzman   She will need to f/u with GI in a couple months.             wears corrective lenses/Normal vision: sees adequately in most situations; can see medication labels, newsprint

## 2022-03-07 ENCOUNTER — LAB (OUTPATIENT)
Dept: LAB | Facility: CLINIC | Age: 87
End: 2022-03-07
Payer: MEDICARE

## 2022-03-07 DIAGNOSIS — R94.6 BORDERLINE ABNORMAL TFTS: ICD-10-CM

## 2022-03-07 DIAGNOSIS — K25.4 GASTROINTESTINAL HEMORRHAGE ASSOCIATED WITH GASTRIC ULCER: ICD-10-CM

## 2022-03-07 DIAGNOSIS — R73.01 IFG (IMPAIRED FASTING GLUCOSE): ICD-10-CM

## 2022-03-07 DIAGNOSIS — Z00.00 MEDICARE ANNUAL WELLNESS VISIT, SUBSEQUENT: ICD-10-CM

## 2022-03-07 DIAGNOSIS — E78.5 HYPERLIPIDEMIA LDL GOAL <100: ICD-10-CM

## 2022-03-07 LAB
ALBUMIN SERPL-MCNC: 3.4 G/DL (ref 3.4–5)
ALP SERPL-CCNC: 55 U/L (ref 40–150)
ALT SERPL W P-5'-P-CCNC: 26 U/L (ref 0–50)
ANION GAP SERPL CALCULATED.3IONS-SCNC: 5 MMOL/L (ref 3–14)
AST SERPL W P-5'-P-CCNC: 29 U/L (ref 0–45)
BASOPHILS # BLD AUTO: 0 10E3/UL (ref 0–0.2)
BASOPHILS NFR BLD AUTO: 0 %
BILIRUB DIRECT SERPL-MCNC: 0.2 MG/DL (ref 0–0.2)
BILIRUB SERPL-MCNC: 0.5 MG/DL (ref 0.2–1.3)
BUN SERPL-MCNC: 27 MG/DL (ref 7–30)
CALCIUM SERPL-MCNC: 9.2 MG/DL (ref 8.5–10.1)
CHLORIDE BLD-SCNC: 107 MMOL/L (ref 94–109)
CHOLEST SERPL-MCNC: 133 MG/DL
CO2 SERPL-SCNC: 26 MMOL/L (ref 20–32)
CREAT SERPL-MCNC: 0.8 MG/DL (ref 0.52–1.04)
CREAT UR-MCNC: 83 MG/DL
EOSINOPHIL # BLD AUTO: 0 10E3/UL (ref 0–0.7)
EOSINOPHIL NFR BLD AUTO: 1 %
ERYTHROCYTE [DISTWIDTH] IN BLOOD BY AUTOMATED COUNT: 15.6 % (ref 10–15)
FASTING STATUS PATIENT QL REPORTED: YES
GFR SERPL CREATININE-BSD FRML MDRD: 69 ML/MIN/1.73M2
GLUCOSE BLD-MCNC: 104 MG/DL (ref 70–99)
HBA1C MFR BLD: 5.4 % (ref 0–5.6)
HCT VFR BLD AUTO: 41.1 % (ref 35–47)
HDLC SERPL-MCNC: 64 MG/DL
HGB BLD-MCNC: 13.2 G/DL (ref 11.7–15.7)
LDLC SERPL CALC-MCNC: 59 MG/DL
LYMPHOCYTES # BLD AUTO: 2.1 10E3/UL (ref 0.8–5.3)
LYMPHOCYTES NFR BLD AUTO: 35 %
MCH RBC QN AUTO: 30.8 PG (ref 26.5–33)
MCHC RBC AUTO-ENTMCNC: 32.1 G/DL (ref 31.5–36.5)
MCV RBC AUTO: 96 FL (ref 78–100)
MICROALBUMIN UR-MCNC: 54 MG/L
MICROALBUMIN/CREAT UR: 65.06 MG/G CR (ref 0–25)
MONOCYTES # BLD AUTO: 0.9 10E3/UL (ref 0–1.3)
MONOCYTES NFR BLD AUTO: 16 %
NEUTROPHILS # BLD AUTO: 2.8 10E3/UL (ref 1.6–8.3)
NEUTROPHILS NFR BLD AUTO: 48 %
NONHDLC SERPL-MCNC: 69 MG/DL
PLATELET # BLD AUTO: 258 10E3/UL (ref 150–450)
POTASSIUM BLD-SCNC: 4 MMOL/L (ref 3.4–5.3)
PROT SERPL-MCNC: 6.8 G/DL (ref 6.8–8.8)
RBC # BLD AUTO: 4.28 10E6/UL (ref 3.8–5.2)
SODIUM SERPL-SCNC: 138 MMOL/L (ref 133–144)
T3FREE SERPL-MCNC: 3 PG/ML (ref 2.3–4.2)
T4 FREE SERPL-MCNC: 1.1 NG/DL (ref 0.76–1.46)
TRIGL SERPL-MCNC: 52 MG/DL
TSH SERPL DL<=0.005 MIU/L-ACNC: 2.06 MU/L (ref 0.4–4)
WBC # BLD AUTO: 5.9 10E3/UL (ref 4–11)

## 2022-03-07 PROCEDURE — 36415 COLL VENOUS BLD VENIPUNCTURE: CPT

## 2022-03-07 PROCEDURE — 82043 UR ALBUMIN QUANTITATIVE: CPT

## 2022-03-07 PROCEDURE — 84481 FREE ASSAY (FT-3): CPT

## 2022-03-07 PROCEDURE — 80053 COMPREHEN METABOLIC PANEL: CPT

## 2022-03-07 PROCEDURE — 83036 HEMOGLOBIN GLYCOSYLATED A1C: CPT

## 2022-03-07 PROCEDURE — 84443 ASSAY THYROID STIM HORMONE: CPT

## 2022-03-07 PROCEDURE — 82248 BILIRUBIN DIRECT: CPT

## 2022-03-07 PROCEDURE — 84439 ASSAY OF FREE THYROXINE: CPT

## 2022-03-07 PROCEDURE — 80061 LIPID PANEL: CPT

## 2022-03-07 PROCEDURE — 85025 COMPLETE CBC W/AUTO DIFF WBC: CPT

## 2022-03-14 ENCOUNTER — OFFICE VISIT (OUTPATIENT)
Dept: OTHER | Facility: CLINIC | Age: 87
End: 2022-03-14
Attending: INTERNAL MEDICINE
Payer: MEDICARE

## 2022-03-14 VITALS
BODY MASS INDEX: 19.53 KG/M2 | WEIGHT: 114.4 LBS | DIASTOLIC BLOOD PRESSURE: 62 MMHG | HEIGHT: 64 IN | OXYGEN SATURATION: 97 % | SYSTOLIC BLOOD PRESSURE: 126 MMHG | HEART RATE: 79 BPM

## 2022-03-14 DIAGNOSIS — R94.6 BORDERLINE ABNORMAL TFTS: ICD-10-CM

## 2022-03-14 DIAGNOSIS — E78.5 HYPERLIPIDEMIA LDL GOAL <100: ICD-10-CM

## 2022-03-14 DIAGNOSIS — K25.4 GASTROINTESTINAL HEMORRHAGE ASSOCIATED WITH GASTRIC ULCER: ICD-10-CM

## 2022-03-14 DIAGNOSIS — I10 ESSENTIAL HYPERTENSION WITH GOAL BLOOD PRESSURE LESS THAN 130/80: ICD-10-CM

## 2022-03-14 DIAGNOSIS — Z00.00 MEDICARE ANNUAL WELLNESS VISIT, SUBSEQUENT: ICD-10-CM

## 2022-03-14 DIAGNOSIS — M85.80 OSTEOPENIA, UNSPECIFIED LOCATION: ICD-10-CM

## 2022-03-14 DIAGNOSIS — R73.01 IFG (IMPAIRED FASTING GLUCOSE): ICD-10-CM

## 2022-03-14 PROCEDURE — G0439 PPPS, SUBSEQ VISIT: HCPCS | Performed by: INTERNAL MEDICINE

## 2022-03-14 PROCEDURE — G0463 HOSPITAL OUTPT CLINIC VISIT: HCPCS

## 2022-03-14 PROCEDURE — 99213 OFFICE O/P EST LOW 20 MIN: CPT | Mod: 25 | Performed by: INTERNAL MEDICINE

## 2022-03-14 RX ORDER — SIMVASTATIN 40 MG
40 TABLET ORAL AT BEDTIME
Qty: 90 TABLET | Refills: 3 | Status: SHIPPED | OUTPATIENT
Start: 2022-03-14 | End: 2023-01-01

## 2022-03-14 RX ORDER — METOPROLOL TARTRATE 100 MG
TABLET ORAL
Qty: 180 TABLET | Refills: 3 | Status: SHIPPED | OUTPATIENT
Start: 2022-03-14 | End: 2023-01-01

## 2022-03-14 RX ORDER — AMLODIPINE BESYLATE 10 MG/1
10 TABLET ORAL DAILY
Qty: 90 TABLET | Refills: 3 | Status: SHIPPED | OUTPATIENT
Start: 2022-03-14 | End: 2023-01-01

## 2022-03-14 RX ORDER — FENOFIBRATE 160 MG/1
160 TABLET ORAL DAILY
Qty: 90 TABLET | Refills: 3 | Status: SHIPPED | OUTPATIENT
Start: 2022-03-14 | End: 2023-01-01

## 2022-03-14 RX ORDER — ALENDRONATE SODIUM 35 MG/1
35 TABLET ORAL WEEKLY
Qty: 13 TABLET | Refills: 3 | Status: SHIPPED | OUTPATIENT
Start: 2022-03-14 | End: 2023-01-01

## 2022-03-14 RX ORDER — EZETIMIBE 10 MG/1
10 TABLET ORAL AT BEDTIME
Qty: 90 TABLET | Refills: 3 | Status: SHIPPED | OUTPATIENT
Start: 2022-03-14 | End: 2023-01-01

## 2022-03-14 RX ORDER — LISINOPRIL 40 MG/1
40 TABLET ORAL DAILY
Qty: 90 TABLET | Refills: 3 | Status: SHIPPED | OUTPATIENT
Start: 2022-03-14 | End: 2023-01-01

## 2022-03-14 RX ORDER — OMEPRAZOLE 40 MG/1
40 CAPSULE, DELAYED RELEASE ORAL 2 TIMES DAILY
Qty: 180 CAPSULE | Refills: 3 | Status: SHIPPED | OUTPATIENT
Start: 2022-03-14 | End: 2023-01-01

## 2022-03-14 NOTE — PROGRESS NOTES
Lori Fall is a 89 year old female who presents for        Medicare annual wellness visit, subsequent  Essential hypertension with goal blood pressure less than 130/80  Hyperlipidemia LDL goal <100  Borderline abnormal TFTs  IFG (impaired fasting glucose)  Osteopenia, unspecified location  Gastrointestinal hemorrhage associated with gastric ulcer        HPI: Lori Fall is a 89 year old female who is a hypertensive, hyperlipidemic, IFG patient presenting for annual physical and lab f/u.    Current providers caring for this patient include:  Patient Care Team:  Alcides Guzman MD as PCP - General (Internal Medicine)  Alcides Guzman MD as Assigned Heart and Vascular Provider    Complete Medical and Social history reviewed with patient, outlined below.    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     PUD (peptic ulcer disease) 1960s     Pure hypercholesterolemia     abstracted 7/5/02       Past Surgical History:   Procedure Laterality Date     ESOPHAGOSCOPY, GASTROSCOPY, DUODENOSCOPY (EGD), COMBINED N/A 5/7/2017    Procedure: COMBINED ESOPHAGOSCOPY, GASTROSCOPY, DUODENOSCOPY (EGD), BIOPSY SINGLE OR MULTIPLE;  EGD;  Surgeon: Guero Olmedo MD;  Location:  GI     TONSILLECTOMY & ADENOIDECTOMY  1956     [facility] NONSPECIFIC PROCEDURE  1995    splenectomy -- abstracted 7/5/02     [facility] NONSPECIFIC PROCEDURE  1977    [facility]  BSO appendectomy     [facility] NONSPECIFIC PROCEDURE  1994    benign breast bopsy     [facility] NONSPECIFIC PROCEDURE  6/28/2010    Left total hip arthroplasty, [place]  "uncemented components       Family History   Problem Relation Age of Onset     Heart Disease Mother         d:age 66     Heart Disease Father         d:age 62     Family History Negative Brother         d:age 35  in war     Cancer Brother         d:age 53 cancer esophagus, alcoholic     Heart Disease Brother         b:1933 bypass surgery       Social History     Tobacco Use     Smoking status: Never Smoker     Smokeless tobacco: Never Used   Substance Use Topics     Alcohol use: No     Alcohol/week: 0.0 standard drinks     Comment: occasional wine       Diet: regular, low salt/low fat  Physical Activity: active without specific exercise program  Depression Screen:    Over the past 2 weeks, patient has felt down, depressed, or hopeless:  No    Over the past 2 weeks, patient has felt little interest or pleasure in doing things: No    Functional ability/Safety screen:  Up and go test (able to get up and walk longer than 30 seconds): Passed  Patient needs assistance with: nothing  Patient's home has the following possible safety concerns: none identified  Patient has concerns about her hearing:  No  Cognitive Screen  Patient repeats three objects (ball, flag, tree)      Clock drawing test:   NORMAL  Recalls three objects after 3 minutes (ball,flag,tree):                                                                                               recalls 3 objects (3 points)    Physical Exam:  BP (!) 140/70 (BP Location: Right arm)   Pulse 79   Ht 5' 4\" (1.626 m)   Wt 114 lb 6.4 oz (51.9 kg)   LMP  (LMP Unknown)   SpO2 97%   BMI 19.64 kg/m     Body mass index is 19.64 kg/m .                GENERAL APPEARANCE: healthy, alert and no distress  PSYCH: Affect normal/bright.  Mentation within normal limits.  EYES: conjunctiva clear  HENT: ear canals and TM's normal.  Nose and mouth without ulcers, erythema or lesions  NECK: supple, nontender, no lymphadenopathy  RESP: lungs clear to auscultation - no rales, rhonchi " or wheezes  CV: regular rates and rhythm, normal S1 S2, no murmur noted  ABDOMEN:  soft, nontender, no HSM or masses and bowel sounds normal  SKIN: no suspicious lesions or rashes  NEURO: Normal strength and tone,  normal speech and mentation  Extremities: no peripheral edema or tenderness, peripheral pulses normal  MS: extremities normal- no gross deformities noted, no erythema, FROM noted in all extremities  PSYCH: mentation appears normal  LYMPHATICS: no cervical adenopathy    End of Life Planning:   Patient currently has an advanced directive: Yes.  Practitioner is supportive of decision.    Education/Counseling:   Based on review of the above information, the following items were addressed:      Elevated blood pressure - follow-up plans made    Appropriate preventive services were discussed with this patient, including applicable screening as appropriate for cardiovascular disease, diabetes, osteopenia/osteoporosis, and glaucoma.  As appropriate for age/gender, discussed screening for colorectal cancer, prostate cancer, breast cancer, and cervical cancer.   Checklist reviewing preventive services available has been given to the patient.        Component      Latest Ref Rng & Units 3/29/2021 3/7/2022   WBC      4.0 - 11.0 10e3/uL 5.0 5.9   RBC Count      3.80 - 5.20 10e6/uL 4.34 4.28   Hemoglobin      11.7 - 15.7 g/dL 13.3 13.2   Hematocrit      35.0 - 47.0 % 41.3 41.1   MCV      78 - 100 fL 95 96   MCH      26.5 - 33.0 pg 30.6 30.8   MCHC      31.5 - 36.5 g/dL 32.2 32.1   RDW      10.0 - 15.0 % 15.4 (H) 15.6 (H)   Platelet Count      150 - 450 10e3/uL 257 258   % Neutrophils      % 50.7 48   % Lymphocytes      % 33.1 35   % Monocytes      % 15.2 16   % Eosinophils      % 0.6 1   % Basophils      % 0.4 0   Absolute Neutrophil      1.6 - 8.3 10e9/L 2.5    Absolute Lymphocytes      0.8 - 5.3 10e9/L 1.6    Absolute Monocytes      0.0 - 1.3 10e9/L 0.8    Absolute Eosinophils      0.0 - 0.7 10e9/L 0.0    Absolute  Basophils      0.0 - 0.2 10e9/L 0.0    Diff Method       Automated Method    Absolute Neutrophils      1.6 - 8.3 10e3/uL  2.8   Absolute Lymphocytes      0.8 - 5.3 10e3/uL  2.1   Absolute Monocytes      0.0 - 1.3 10e3/uL  0.9   Absolute Eosinophils      0.0 - 0.7 10e3/uL  0.0   Absolute Basophils      0.0 - 0.2 10e3/uL  0.0   Sodium      133 - 144 mmol/L 138 138   Potassium      3.4 - 5.3 mmol/L 4.2 4.0   Chloride      94 - 109 mmol/L 108 107   Carbon Dioxide      20 - 32 mmol/L 27 26   Anion Gap      3 - 14 mmol/L 3 5   Glucose      70 - 99 mg/dL 113 (H) 104 (H)   Urea Nitrogen      7 - 30 mg/dL 23 27   Creatinine      0.52 - 1.04 mg/dL 0.76 0.80   GFR Estimate      >60 mL/min/1.73m2 69 69   GFR Estimate If Black      >60 mL/min/1.73:m2 80    Calcium      8.5 - 10.1 mg/dL 9.7 9.2   Bilirubin Direct      0.0 - 0.2 mg/dL 0.2 0.2   Bilirubin Total      0.2 - 1.3 mg/dL 0.6 0.5   Albumin      3.4 - 5.0 g/dL 3.7 3.4   Protein Total      6.8 - 8.8 g/dL 7.2 6.8   Alkaline Phosphatase      40 - 150 U/L 59    ALT      0 - 50 U/L 24 26   AST      0 - 45 U/L 28 29   Alkaline Phosphatase      40 - 150 U/L  55   Cholesterol      <200 mg/dL 144 133   Triglycerides      <150 mg/dL 53 52   HDL Cholesterol      >=50 mg/dL 68 64   LDL Cholesterol Calculated      <=100 mg/dL 65 59   Non HDL Cholesterol      <130 mg/dL 76 69   Patient Fasting > 8hrs?        Yes   Creatinine Urine      mg/dL  83   Albumin Urine mg/L      mg/L  54   Albumin Urine mg/g Cr      0.00 - 25.00 mg/g Cr  65.06 (H)   Free T3      2.3 - 4.2 pg/mL 2.8 3.0   T4 Free      0.76 - 1.46 ng/dL 1.03 1.10   TSH      0.40 - 4.00 mU/L 1.96 2.06   Hemoglobin A1C POCT      0 - 5.6 % 5.5    Hemoglobin A1C      0.0 - 5.6 %  5.4         A/P:            (Z00.00) Medicare annual wellness visit, subsequent  (primary encounter diagnosis)  Comment: doing well, RTC one year  Plan: Follow-Up with Vascular Medicine, CBC with         platelets differential, T3 Free, T4 free, TSH,          Basic metabolic panel, Hepatic panel, Lipid         Profile            (I10) Essential hypertension with goal blood pressure less than 130/80  Comment: at goal  Plan: OFFICE/OUTPT VISIT,EST,LEVL III, amLODIPine         (NORVASC) 10 MG tablet, lisinopril (ZESTRIL) 40        MG tablet, metoprolol tartrate (LOPRESSOR) 100         MG tablet, Follow-Up with Vascular Medicine            (E78.5) Hyperlipidemia LDL goal <100  Comment: at gtoal  Plan: OFFICE/OUTPT VISIT,EST,LEVL III, ezetimibe         (ZETIA) 10 MG tablet, fenofibrate         (TRIGLIDE/LOFIBRA) 160 MG tablet, simvastatin         (ZOCOR) 40 MG tablet, Follow-Up with Vascular         Medicine, Lipid Profile            (R94.6) Borderline abnormal TFTs  Comment: normal  Plan: OFFICE/OUTPT VISIT,EST,LEVL III, Follow-Up with        Vascular Medicine, T3 Free, T4 free, TSH            (R73.01) IFG (impaired fasting glucose)  Comment: avoid CHO  Plan: OFFICE/OUTPT VISIT,EST,LEVL III, Follow-Up with        Vascular Medicine, Hemoglobin A1c, Albumin         Random Urine Quantitative with Creat Ratio            (M85.80) Osteopenia, unspecified location  Comment: needs the below  Plan: alendronate (FOSAMAX) 35 MG tablet, Follow-Up         with Vascular Medicine            (K25.4) Gastrointestinal hemorrhage associated with gastric ulcer  Comment: needs the below  Plan: omeprazole (PRILOSEC) 40 MG DR capsule,         Follow-Up with Vascular Medicine, CBC with         platelets differential                 20 minutes not spent on preventive care during this visit was spent providing medical care regarding item(s) # 2 onward as delineated above.

## 2022-03-14 NOTE — PROGRESS NOTES
"Patient is here for a follow up  to discuss One year follow up to 4/9/21 (PCP patient)  Fasting labs and urinalysis 3/7/22  In clinic visit one week later. *gbn    BP (!) 140/70 (BP Location: Right arm)   Pulse 79   Ht 5' 4\" (1.626 m)   Wt 114 lb 6.4 oz (51.9 kg)   LMP  (LMP Unknown)   SpO2 97%   BMI 19.64 kg/m        Questions patient would like addressed today are: N/A.    Refills are needed: No    Has homecare services and agency name:  No - independent living: [facility].    Richard Pinto  "

## 2022-10-23 ENCOUNTER — HEALTH MAINTENANCE LETTER (OUTPATIENT)
Age: 87
End: 2022-10-23

## 2023-01-01 ENCOUNTER — OFFICE VISIT (OUTPATIENT)
Dept: OTHER | Facility: CLINIC | Age: 88
End: 2023-01-01
Attending: INTERNAL MEDICINE
Payer: MEDICARE

## 2023-01-01 VITALS
OXYGEN SATURATION: 97 % | HEART RATE: 63 BPM | BODY MASS INDEX: 21.64 KG/M2 | SYSTOLIC BLOOD PRESSURE: 128 MMHG | HEIGHT: 62 IN | WEIGHT: 117.6 LBS | DIASTOLIC BLOOD PRESSURE: 72 MMHG

## 2023-01-01 DIAGNOSIS — E78.5 HYPERLIPIDEMIA LDL GOAL <100: ICD-10-CM

## 2023-01-01 DIAGNOSIS — Z00.00 MEDICARE ANNUAL WELLNESS VISIT, SUBSEQUENT: ICD-10-CM

## 2023-01-01 DIAGNOSIS — I10 ESSENTIAL HYPERTENSION WITH GOAL BLOOD PRESSURE LESS THAN 130/80: ICD-10-CM

## 2023-01-01 DIAGNOSIS — R73.01 IFG (IMPAIRED FASTING GLUCOSE): ICD-10-CM

## 2023-01-01 DIAGNOSIS — R94.6 BORDERLINE ABNORMAL TFTS: ICD-10-CM

## 2023-01-01 DIAGNOSIS — K25.4 GASTROINTESTINAL HEMORRHAGE ASSOCIATED WITH GASTRIC ULCER: ICD-10-CM

## 2023-01-01 DIAGNOSIS — M85.80 OSTEOPENIA, UNSPECIFIED LOCATION: ICD-10-CM

## 2023-01-01 PROCEDURE — 99213 OFFICE O/P EST LOW 20 MIN: CPT | Mod: 25 | Performed by: INTERNAL MEDICINE

## 2023-01-01 PROCEDURE — G0463 HOSPITAL OUTPT CLINIC VISIT: HCPCS

## 2023-01-01 PROCEDURE — G0463 HOSPITAL OUTPT CLINIC VISIT: HCPCS | Performed by: INTERNAL MEDICINE

## 2023-01-01 PROCEDURE — G0439 PPPS, SUBSEQ VISIT: HCPCS | Performed by: INTERNAL MEDICINE

## 2023-01-01 RX ORDER — FENOFIBRATE 160 MG/1
160 TABLET ORAL DAILY
Qty: 90 TABLET | Refills: 3 | Status: SHIPPED | OUTPATIENT
Start: 2023-01-01

## 2023-01-01 RX ORDER — AMLODIPINE BESYLATE 10 MG/1
10 TABLET ORAL DAILY
Qty: 90 TABLET | Refills: 3 | Status: SHIPPED | OUTPATIENT
Start: 2023-01-01

## 2023-01-01 RX ORDER — LISINOPRIL 40 MG/1
TABLET ORAL
Qty: 90 TABLET | Refills: 3 | OUTPATIENT
Start: 2023-01-01

## 2023-01-01 RX ORDER — LISINOPRIL 40 MG/1
40 TABLET ORAL DAILY
Qty: 90 TABLET | Refills: 3 | Status: SHIPPED | OUTPATIENT
Start: 2023-01-01

## 2023-01-01 RX ORDER — ALENDRONATE SODIUM 35 MG/1
35 TABLET ORAL WEEKLY
Qty: 13 TABLET | Refills: 3 | Status: SHIPPED | OUTPATIENT
Start: 2023-01-01 | End: 2024-01-01

## 2023-01-01 RX ORDER — AMLODIPINE BESYLATE 10 MG/1
TABLET ORAL
Qty: 90 TABLET | Refills: 3 | OUTPATIENT
Start: 2023-01-01

## 2023-01-01 RX ORDER — SIMVASTATIN 40 MG
40 TABLET ORAL AT BEDTIME
Qty: 90 TABLET | Refills: 3 | Status: SHIPPED | OUTPATIENT
Start: 2023-01-01

## 2023-01-01 RX ORDER — OMEPRAZOLE 40 MG/1
40 CAPSULE, DELAYED RELEASE ORAL 2 TIMES DAILY
Qty: 180 CAPSULE | Refills: 3 | Status: SHIPPED | OUTPATIENT
Start: 2023-01-01 | End: 2024-01-01

## 2023-01-01 RX ORDER — EZETIMIBE 10 MG/1
10 TABLET ORAL AT BEDTIME
Qty: 90 TABLET | Refills: 3 | Status: SHIPPED | OUTPATIENT
Start: 2023-01-01

## 2023-01-01 RX ORDER — FENOFIBRATE 160 MG/1
TABLET ORAL
Qty: 90 TABLET | Refills: 3 | OUTPATIENT
Start: 2023-01-01

## 2023-01-01 RX ORDER — METOPROLOL TARTRATE 100 MG
TABLET ORAL
Qty: 180 TABLET | Refills: 3 | Status: SHIPPED | OUTPATIENT
Start: 2023-01-01

## 2023-01-01 RX ORDER — EZETIMIBE 10 MG/1
TABLET ORAL
Qty: 90 TABLET | Refills: 3 | OUTPATIENT
Start: 2023-01-01

## 2023-03-22 ENCOUNTER — LAB (OUTPATIENT)
Dept: LAB | Facility: CLINIC | Age: 88
End: 2023-03-22
Payer: MEDICARE

## 2023-03-22 DIAGNOSIS — R73.01 IFG (IMPAIRED FASTING GLUCOSE): ICD-10-CM

## 2023-03-22 DIAGNOSIS — Z00.00 MEDICARE ANNUAL WELLNESS VISIT, SUBSEQUENT: ICD-10-CM

## 2023-03-22 LAB
ALBUMIN SERPL BCG-MCNC: 4.1 G/DL (ref 3.5–5.2)
ALP SERPL-CCNC: 53 U/L (ref 35–104)
ALT SERPL W P-5'-P-CCNC: 16 U/L (ref 10–35)
ANION GAP SERPL CALCULATED.3IONS-SCNC: 14 MMOL/L (ref 7–15)
AST SERPL W P-5'-P-CCNC: 37 U/L (ref 10–35)
BASOPHILS # BLD AUTO: 0 10E3/UL (ref 0–0.2)
BASOPHILS NFR BLD AUTO: 0 %
BILIRUB SERPL-MCNC: 0.5 MG/DL
BUN SERPL-MCNC: 28.1 MG/DL (ref 8–23)
CALCIUM SERPL-MCNC: 9.5 MG/DL (ref 8.2–9.6)
CHLORIDE SERPL-SCNC: 105 MMOL/L (ref 98–107)
CHOLEST SERPL-MCNC: 151 MG/DL
CREAT SERPL-MCNC: 0.75 MG/DL (ref 0.51–0.95)
DEPRECATED HCO3 PLAS-SCNC: 22 MMOL/L (ref 22–29)
EOSINOPHIL # BLD AUTO: 0.1 10E3/UL (ref 0–0.7)
EOSINOPHIL NFR BLD AUTO: 1 %
ERYTHROCYTE [DISTWIDTH] IN BLOOD BY AUTOMATED COUNT: 16.3 % (ref 10–15)
GFR SERPL CREATININE-BSD FRML MDRD: 74 ML/MIN/1.73M2
GLUCOSE SERPL-MCNC: 97 MG/DL (ref 70–99)
HBA1C MFR BLD: 5.4 % (ref 0–5.6)
HCT VFR BLD AUTO: 39.7 % (ref 35–47)
HDLC SERPL-MCNC: 64 MG/DL
HGB BLD-MCNC: 13 G/DL (ref 11.7–15.7)
LDLC SERPL CALC-MCNC: 76 MG/DL
LYMPHOCYTES # BLD AUTO: 2 10E3/UL (ref 0.8–5.3)
LYMPHOCYTES NFR BLD AUTO: 29 %
MCH RBC QN AUTO: 31.8 PG (ref 26.5–33)
MCHC RBC AUTO-ENTMCNC: 32.7 G/DL (ref 31.5–36.5)
MCV RBC AUTO: 97 FL (ref 78–100)
MONOCYTES # BLD AUTO: 1 10E3/UL (ref 0–1.3)
MONOCYTES NFR BLD AUTO: 15 %
NEUTROPHILS # BLD AUTO: 3.8 10E3/UL (ref 1.6–8.3)
NEUTROPHILS NFR BLD AUTO: 55 %
NONHDLC SERPL-MCNC: 87 MG/DL
PLATELET # BLD AUTO: 238 10E3/UL (ref 150–450)
POTASSIUM SERPL-SCNC: 4.2 MMOL/L (ref 3.4–5.3)
PROT SERPL-MCNC: 6.7 G/DL (ref 6.4–8.3)
RBC # BLD AUTO: 4.09 10E6/UL (ref 3.8–5.2)
SODIUM SERPL-SCNC: 141 MMOL/L (ref 136–145)
T3FREE SERPL-MCNC: 3.2 PG/ML (ref 2–4.4)
T4 FREE SERPL-MCNC: 1.17 NG/DL (ref 0.9–1.7)
TRIGL SERPL-MCNC: 57 MG/DL
TSH SERPL DL<=0.005 MIU/L-ACNC: 2.59 UIU/ML (ref 0.3–4.2)
WBC # BLD AUTO: 6.9 10E3/UL (ref 4–11)

## 2023-03-22 PROCEDURE — 36415 COLL VENOUS BLD VENIPUNCTURE: CPT

## 2023-03-22 PROCEDURE — 80061 LIPID PANEL: CPT

## 2023-03-22 PROCEDURE — 84439 ASSAY OF FREE THYROXINE: CPT

## 2023-03-22 PROCEDURE — 84443 ASSAY THYROID STIM HORMONE: CPT

## 2023-03-22 PROCEDURE — 83036 HEMOGLOBIN GLYCOSYLATED A1C: CPT

## 2023-03-22 PROCEDURE — 80053 COMPREHEN METABOLIC PANEL: CPT

## 2023-03-22 PROCEDURE — 85027 COMPLETE CBC AUTOMATED: CPT

## 2023-03-22 PROCEDURE — 84481 FREE ASSAY (FT-3): CPT

## 2023-04-04 NOTE — PROGRESS NOTES
Lori Fall is a 89 year old female who presents for         Medicare annual wellness visit, subsequent  Essential hypertension with goal blood pressure less than 130/80  Hyperlipidemia LDL goal <100  Borderline abnormal TFTs  IFG (impaired fasting glucose)  Osteopenia, unspecified location  Gastrointestinal hemorrhage associated with gastric ulcer           HPI: Lori Fall is a 89 year old female who is a hypertensive, hyperlipidemic, IFG patient presenting for annual physical and lab f/u.     Current providers caring for this patient include:  Patient Care Team:  Alcides Guzman MD as PCP - General (Internal Medicine)  Alcides Guzman MD as Assigned Heart and Vascular Provider     Complete Medical and Social history reviewed with patient, outlined below.         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     PUD (peptic ulcer disease) 1960s     Pure hypercholesterolemia       abstracted 7/5/02            Past Surgical History         Past Surgical History:   Procedure Laterality Date     ESOPHAGOSCOPY, GASTROSCOPY, DUODENOSCOPY (EGD), COMBINED N/A 5/7/2017     Procedure: COMBINED ESOPHAGOSCOPY, GASTROSCOPY, DUODENOSCOPY (EGD), BIOPSY SINGLE OR MULTIPLE;  EGD;  Surgeon: Guero Olmedo MD;  Location:  GI     TONSILLECTOMY & ADENOIDECTOMY   1956     [facility] NONSPECIFIC PROCEDURE   1995     splenectomy -- abstracted 7/5/02     [facility] NONSPECIFIC PROCEDURE   1977     [facility]  BSO appendectomy     [facility] NONSPECIFIC PROCEDURE   1994     benign breast  "bopsy     [facility] NONSPECIFIC PROCEDURE   2010     Left total hip arthroplasty, [facility] uncemented components            Family History         Family History   Problem Relation Age of Onset     Heart Disease Mother           d:age 66     Heart Disease Father           d:age 62     Family History Negative Brother           d:age 35  in war     Cancer Brother           d:age 53 cancer esophagus, alcoholic     Heart Disease Brother           b:1933 bypass surgery            Social History            Tobacco Use     Smoking status: Never Smoker     Smokeless tobacco: Never Used   Substance Use Topics     Alcohol use: No       Alcohol/week: 0.0 standard drinks       Comment: occasional wine         Diet: regular, low salt/low fat  Physical Activity: active without specific exercise program  Depression Screen:    Over the past 2 weeks, patient has felt down, depressed, or hopeless:  No    Over the past 2 weeks, patient has felt little interest or pleasure in doing things: No     Functional ability/Safety screen:  Up and go test (able to get up and walk longer than 30 seconds): Passed  Patient needs assistance with: nothing  Patient's home has the following possible safety concerns: none identified  Patient has concerns about her hearing:  No  Cognitive Screen  Patient repeats three objects (ball, flag, tree)      Clock drawing test:   NORMAL  Recalls three objects after 3 minutes (ball,flag,tree):                                                                                               recalls 3 objects (3 points)     Physical Exam:  BP (!) 140/70 (BP Location: Right arm)   Pulse 79   Ht 5' 4\" (1.626 m)   Wt 114 lb 6.4 oz (51.9 kg)   LMP  (LMP Unknown)   SpO2 97%   BMI 19.64 kg/m     Body mass index is 19.64 kg/m .                     GENERAL APPEARANCE: healthy, alert and no distress  PSYCH: Affect normal/bright.  Mentation within normal limits.  EYES: conjunctiva clear  HENT: ear canals and TM's normal.  " Nose and mouth without ulcers, erythema or lesions  NECK: supple, nontender, no lymphadenopathy  RESP: lungs clear to auscultation - no rales, rhonchi or wheezes  CV: regular rates and rhythm, normal S1 S2, no murmur noted  ABDOMEN:  soft, nontender, no HSM or masses and bowel sounds normal  SKIN: no suspicious lesions or rashes  NEURO: Normal strength and tone,  normal speech and mentation  Extremities: no peripheral edema or tenderness, peripheral pulses normal  MS: extremities normal- no gross deformities noted, no erythema, FROM noted in all extremities  PSYCH: mentation appears normal  LYMPHATICS: no cervical adenopathy     End of Life Planning:   Patient currently has an advanced directive: Yes.  Practitioner is supportive of decision.     Education/Counseling:   Based on review of the above information, the following items were addressed:      Elevated blood pressure - follow-up plans made     Appropriate preventive services were discussed with this patient, including applicable screening as appropriate for cardiovascular disease, diabetes, osteopenia/osteoporosis, and glaucoma.  As appropriate for age/gender, discussed screening for colorectal cancer, prostate cancer, breast cancer, and cervical cancer.   Checklist reviewing preventive services available has been given to the patient.           Component      Latest Ref Rng & Units 3/29/2021 3/7/2022   WBC      4.0 - 11.0 10e3/uL 5.0 5.9   RBC Count      3.80 - 5.20 10e6/uL 4.34 4.28   Hemoglobin      11.7 - 15.7 g/dL 13.3 13.2   Hematocrit      35.0 - 47.0 % 41.3 41.1   MCV      78 - 100 fL 95 96   MCH      26.5 - 33.0 pg 30.6 30.8   MCHC      31.5 - 36.5 g/dL 32.2 32.1   RDW      10.0 - 15.0 % 15.4 (H) 15.6 (H)   Platelet Count      150 - 450 10e3/uL 257 258   % Neutrophils      % 50.7 48   % Lymphocytes      % 33.1 35   % Monocytes      % 15.2 16   % Eosinophils      % 0.6 1   % Basophils      % 0.4 0   Absolute Neutrophil      1.6 - 8.3 10e9/L 2.5      Absolute Lymphocytes      0.8 - 5.3 10e9/L 1.6     Absolute Monocytes      0.0 - 1.3 10e9/L 0.8     Absolute Eosinophils      0.0 - 0.7 10e9/L 0.0     Absolute Basophils      0.0 - 0.2 10e9/L 0.0     Diff Method       Automated Method     Absolute Neutrophils      1.6 - 8.3 10e3/uL   2.8   Absolute Lymphocytes      0.8 - 5.3 10e3/uL   2.1   Absolute Monocytes      0.0 - 1.3 10e3/uL   0.9   Absolute Eosinophils      0.0 - 0.7 10e3/uL   0.0   Absolute Basophils      0.0 - 0.2 10e3/uL   0.0   Sodium      133 - 144 mmol/L 138 138   Potassium      3.4 - 5.3 mmol/L 4.2 4.0   Chloride      94 - 109 mmol/L 108 107   Carbon Dioxide      20 - 32 mmol/L 27 26   Anion Gap      3 - 14 mmol/L 3 5   Glucose      70 - 99 mg/dL 113 (H) 104 (H)   Urea Nitrogen      7 - 30 mg/dL 23 27   Creatinine      0.52 - 1.04 mg/dL 0.76 0.80   GFR Estimate      >60 mL/min/1.73m2 69 69   GFR Estimate If Black      >60 mL/min/1.73:m2 80     Calcium      8.5 - 10.1 mg/dL 9.7 9.2   Bilirubin Direct      0.0 - 0.2 mg/dL 0.2 0.2   Bilirubin Total      0.2 - 1.3 mg/dL 0.6 0.5   Albumin      3.4 - 5.0 g/dL 3.7 3.4   Protein Total      6.8 - 8.8 g/dL 7.2 6.8   Alkaline Phosphatase      40 - 150 U/L 59     ALT      0 - 50 U/L 24 26   AST      0 - 45 U/L 28 29   Alkaline Phosphatase      40 - 150 U/L   55   Cholesterol      <200 mg/dL 144 133   Triglycerides      <150 mg/dL 53 52   HDL Cholesterol      >=50 mg/dL 68 64   LDL Cholesterol Calculated      <=100 mg/dL 65 59   Non HDL Cholesterol      <130 mg/dL 76 69   Patient Fasting > 8hrs?         Yes   Creatinine Urine      mg/dL   83   Albumin Urine mg/L      mg/L   54   Albumin Urine mg/g Cr      0.00 - 25.00 mg/g Cr   65.06 (H)   Free T3      2.3 - 4.2 pg/mL 2.8 3.0   T4 Free      0.76 - 1.46 ng/dL 1.03 1.10   TSH      0.40 - 4.00 mU/L 1.96 2.06   Hemoglobin A1C POCT      0 - 5.6 % 5.5     Hemoglobin A1C      0.0 - 5.6 %   5.4                 Component      Latest Ref Rn 3/22/2023   WBC      4.0 -  11.0 10e3/uL 6.9    RBC Count      3.80 - 5.20 10e6/uL 4.09    Hemoglobin      11.7 - 15.7 g/dL 13.0    Hematocrit      35.0 - 47.0 % 39.7    MCV      78 - 100 fL 97    MCH      26.5 - 33.0 pg 31.8    MCHC      31.5 - 36.5 g/dL 32.7    RDW      10.0 - 15.0 % 16.3 (H)    Platelet Count      150 - 450 10e3/uL 238    % Neutrophils      % 55    % Lymphocytes      % 29    % Monocytes      % 15    % Eosinophils      % 1    % Basophils      % 0    Absolute Neutrophils      1.6 - 8.3 10e3/uL 3.8    Absolute Lymphocytes      0.8 - 5.3 10e3/uL 2.0    Absolute Monocytes      0.0 - 1.3 10e3/uL 1.0    Absolute Eosinophils      0.0 - 0.7 10e3/uL 0.1    Absolute Basophils      0.0 - 0.2 10e3/uL 0.0    Sodium      136 - 145 mmol/L 141    Potassium      3.4 - 5.3 mmol/L 4.2    Chloride      98 - 107 mmol/L 105    Carbon Dioxide (CO2)      22 - 29 mmol/L 22    Anion Gap      7 - 15 mmol/L 14    Urea Nitrogen      8.0 - 23.0 mg/dL 28.1 (H)    Creatinine      0.51 - 0.95 mg/dL 0.75    Calcium      8.2 - 9.6 mg/dL 9.5    Glucose      70 - 99 mg/dL 97    Alkaline Phosphatase      35 - 104 U/L 53    AST      10 - 35 U/L 37 (H)    ALT      10 - 35 U/L 16    Protein Total      6.4 - 8.3 g/dL 6.7    Albumin      3.5 - 5.2 g/dL 4.1    Bilirubin Total      <=1.2 mg/dL 0.5    GFR Estimate      >60 mL/min/1.73m2 74    Cholesterol      <200 mg/dL 151    Triglycerides      <150 mg/dL 57    HDL Cholesterol      >=50 mg/dL 64    LDL Cholesterol Calculated      <=100 mg/dL 76    Non HDL Cholesterol      <130 mg/dL 87    TSH      0.30 - 4.20 uIU/mL 2.59    T4 Free      0.90 - 1.70 ng/dL 1.17    Free T3      2.0 - 4.4 pg/mL 3.2    Hemoglobin A1C      0.0 - 5.6 % 5.4                 A/P:              (Z00.00) Medicare annual wellness visit, subsequent  (primary encounter diagnosis)  Comment: doing well, RTC one year  Plan: Follow-Up with Vascular Medicine, CBC with         platelets differential, T3 Free, T4 free, TSH,         Basic metabolic panel,  Hepatic panel, Lipid         Profile            (I10) Essential hypertension with goal blood pressure less than 130/80  Comment: at goal   Plan: OFFICE/OUTPT VISIT,EST,LEVL III, amLODIPine         (NORVASC) 10 MG tablet, lisinopril (ZESTRIL) 40        MG tablet, metoprolol tartrate (LOPRESSOR) 100         MG tablet, Follow-Up with Vascular Medicine            (E78.5) Hyperlipidemia LDL goal <100  Comment: at goal  Plan: OFFICE/OUTPT VISIT,EST,LEVL III, ezetimibe         (ZETIA) 10 MG tablet, fenofibrate         (TRIGLIDE/LOFIBRA) 160 MG tablet, simvastatin         (ZOCOR) 40 MG tablet, Follow-Up with Vascular         Medicine, Lipid Profile            (R94.6) Borderline abnormal TFTs  Comment: normal  Plan: OFFICE/OUTPT VISIT,EST,LEVL III, Follow-Up with        Vascular Medicine, T3 Free, T4 free, TSH            (R73.01) IFG (impaired fasting glucose)  Comment: avoid CHO  Plan: OFFICE/OUTPT VISIT,EST,LEVL III, Follow-Up with        Vascular Medicine, Hemoglobin A1c, Albumin         Random Urine Quantitative with Creat Ratio            (M85.80) Osteopenia, unspecified location  Comment: needs the below  Plan: alendronate (FOSAMAX) 35 MG tablet, Follow-Up         with Vascular Medicine            (K25.4) Gastrointestinal hemorrhage associated with gastric ulcer  Comment: needs the below  Plan: omeprazole (PRILOSEC) 40 MG DR capsule,         Follow-Up with Vascular Medicine, CBC with         platelets differential                 20 minutes not spent on preventive care during this visit was spent providing medical care regarding item(s) # 2 onward as delineated above.

## 2023-04-04 NOTE — PROGRESS NOTES
"Patient is here to discuss follow up    /72 (BP Location: Right arm, Patient Position: Chair, Cuff Size: Adult Regular)   Pulse 63   Ht 5' 2\" (1.575 m)   Wt 117 lb 9.6 oz (53.3 kg)   LMP  (LMP Unknown)   SpO2 97%   BMI 21.51 kg/m      Questions patient would like addressed today are: N/A.    Refills are needed: No    Has homecare services and agency name:  [facility]    "

## 2024-01-01 ENCOUNTER — DOCUMENTATION ONLY (OUTPATIENT)
Dept: GERIATRICS | Facility: CLINIC | Age: 89
End: 2024-01-01
Payer: MEDICARE

## 2024-01-01 ENCOUNTER — DOCUMENTATION ONLY (OUTPATIENT)
Dept: OTHER | Facility: CLINIC | Age: 89
End: 2024-01-01
Payer: MEDICARE

## 2024-01-01 ENCOUNTER — APPOINTMENT (OUTPATIENT)
Dept: CT IMAGING | Facility: CLINIC | Age: 89
DRG: 558 | End: 2024-01-01
Attending: EMERGENCY MEDICINE
Payer: MEDICARE

## 2024-01-01 ENCOUNTER — HOSPITAL ENCOUNTER (INPATIENT)
Facility: CLINIC | Age: 89
LOS: 3 days | Discharge: NURSING, REHAB OR LONG-TERM CARE | DRG: 558 | End: 2024-03-06
Attending: EMERGENCY MEDICINE | Admitting: INTERNAL MEDICINE
Payer: MEDICARE

## 2024-01-01 ENCOUNTER — TRANSITIONAL CARE UNIT VISIT (OUTPATIENT)
Dept: GERIATRICS | Facility: CLINIC | Age: 89
End: 2024-01-01
Payer: MEDICARE

## 2024-01-01 ENCOUNTER — TELEPHONE (OUTPATIENT)
Dept: GERIATRICS | Facility: CLINIC | Age: 89
End: 2024-01-01

## 2024-01-01 ENCOUNTER — TELEPHONE (OUTPATIENT)
Dept: OTHER | Facility: CLINIC | Age: 89
End: 2024-01-01
Payer: MEDICARE

## 2024-01-01 ENCOUNTER — TELEPHONE (OUTPATIENT)
Dept: GERIATRICS | Facility: CLINIC | Age: 89
End: 2024-01-01
Payer: MEDICARE

## 2024-01-01 ENCOUNTER — APPOINTMENT (OUTPATIENT)
Dept: PHYSICAL THERAPY | Facility: CLINIC | Age: 89
DRG: 558 | End: 2024-01-01
Attending: PSYCHIATRY & NEUROLOGY
Payer: MEDICARE

## 2024-01-01 ENCOUNTER — HOSPITAL ENCOUNTER (INPATIENT)
Dept: NEUROLOGY | Facility: CLINIC | Age: 89
Discharge: HOME | DRG: 558 | End: 2024-03-04
Attending: PSYCHIATRY & NEUROLOGY
Payer: MEDICARE

## 2024-01-01 ENCOUNTER — APPOINTMENT (OUTPATIENT)
Dept: PHYSICAL THERAPY | Facility: CLINIC | Age: 89
DRG: 558 | End: 2024-01-01
Attending: PHYSICIAN ASSISTANT
Payer: MEDICARE

## 2024-01-01 ENCOUNTER — LAB REQUISITION (OUTPATIENT)
Dept: LAB | Facility: CLINIC | Age: 89
End: 2024-01-01
Payer: MEDICARE

## 2024-01-01 ENCOUNTER — APPOINTMENT (OUTPATIENT)
Dept: GENERAL RADIOLOGY | Facility: CLINIC | Age: 89
DRG: 558 | End: 2024-01-01
Attending: EMERGENCY MEDICINE
Payer: MEDICARE

## 2024-01-01 ENCOUNTER — APPOINTMENT (OUTPATIENT)
Dept: OCCUPATIONAL THERAPY | Facility: CLINIC | Age: 89
DRG: 558 | End: 2024-01-01
Attending: PHYSICIAN ASSISTANT
Payer: MEDICARE

## 2024-01-01 ENCOUNTER — APPOINTMENT (OUTPATIENT)
Dept: CARDIOLOGY | Facility: CLINIC | Age: 89
DRG: 558 | End: 2024-01-01
Attending: INTERNAL MEDICINE
Payer: MEDICARE

## 2024-01-01 ENCOUNTER — APPOINTMENT (OUTPATIENT)
Dept: GENERAL RADIOLOGY | Facility: CLINIC | Age: 89
DRG: 558 | End: 2024-01-01
Attending: NURSE PRACTITIONER
Payer: MEDICARE

## 2024-01-01 ENCOUNTER — APPOINTMENT (OUTPATIENT)
Dept: SPEECH THERAPY | Facility: CLINIC | Age: 89
DRG: 558 | End: 2024-01-01
Attending: PHYSICIAN ASSISTANT
Payer: MEDICARE

## 2024-01-01 VITALS
DIASTOLIC BLOOD PRESSURE: 47 MMHG | BODY MASS INDEX: 22.7 KG/M2 | WEIGHT: 124.12 LBS | OXYGEN SATURATION: 93 % | SYSTOLIC BLOOD PRESSURE: 97 MMHG | TEMPERATURE: 98.1 F | RESPIRATION RATE: 18 BRPM | HEART RATE: 56 BPM

## 2024-01-01 VITALS
RESPIRATION RATE: 18 BRPM | HEIGHT: 62 IN | SYSTOLIC BLOOD PRESSURE: 139 MMHG | BODY MASS INDEX: 22.63 KG/M2 | TEMPERATURE: 97.2 F | WEIGHT: 123 LBS | DIASTOLIC BLOOD PRESSURE: 68 MMHG | OXYGEN SATURATION: 96 % | HEART RATE: 79 BPM

## 2024-01-01 VITALS
WEIGHT: 111.2 LBS | DIASTOLIC BLOOD PRESSURE: 78 MMHG | HEART RATE: 76 BPM | HEIGHT: 60 IN | SYSTOLIC BLOOD PRESSURE: 152 MMHG | OXYGEN SATURATION: 96 % | TEMPERATURE: 97.2 F | BODY MASS INDEX: 21.83 KG/M2 | RESPIRATION RATE: 20 BRPM

## 2024-01-01 VITALS
BODY MASS INDEX: 23.97 KG/M2 | SYSTOLIC BLOOD PRESSURE: 135 MMHG | DIASTOLIC BLOOD PRESSURE: 65 MMHG | HEIGHT: 60 IN | TEMPERATURE: 97.8 F | WEIGHT: 122.1 LBS | OXYGEN SATURATION: 95 % | RESPIRATION RATE: 16 BRPM | HEART RATE: 78 BPM

## 2024-01-01 VITALS
HEART RATE: 80 BPM | OXYGEN SATURATION: 95 % | SYSTOLIC BLOOD PRESSURE: 120 MMHG | BODY MASS INDEX: 21.83 KG/M2 | TEMPERATURE: 96.9 F | RESPIRATION RATE: 20 BRPM | WEIGHT: 111.2 LBS | HEIGHT: 60 IN | DIASTOLIC BLOOD PRESSURE: 76 MMHG

## 2024-01-01 VITALS
BODY MASS INDEX: 21.79 KG/M2 | OXYGEN SATURATION: 96 % | HEIGHT: 60 IN | RESPIRATION RATE: 17 BRPM | SYSTOLIC BLOOD PRESSURE: 137 MMHG | DIASTOLIC BLOOD PRESSURE: 65 MMHG | TEMPERATURE: 98 F | HEART RATE: 79 BPM | WEIGHT: 111 LBS
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
## 2024-01-01 DIAGNOSIS — R54 FRAIL ELDERLY: ICD-10-CM

## 2024-01-01 DIAGNOSIS — R53.81 PHYSICAL DECONDITIONING: ICD-10-CM

## 2024-01-01 DIAGNOSIS — R63.0 POOR APPETITE: ICD-10-CM

## 2024-01-01 DIAGNOSIS — L89.526 PRESSURE INJURY OF DEEP TISSUE OF LEFT ANKLE: Primary | ICD-10-CM

## 2024-01-01 DIAGNOSIS — M62.81 GENERALIZED MUSCLE WEAKNESS: ICD-10-CM

## 2024-01-01 DIAGNOSIS — M62.82 NON-TRAUMATIC RHABDOMYOLYSIS: ICD-10-CM
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
## 2024-01-01 DIAGNOSIS — N17.9 ACUTE KIDNEY FAILURE, UNSPECIFIED (H): ICD-10-CM

## 2024-01-01 DIAGNOSIS — F07.81 POST CONCUSSIVE SYNDROME: ICD-10-CM

## 2024-01-01 DIAGNOSIS — Z51.5 HOSPICE CARE: Primary | ICD-10-CM

## 2024-01-01 DIAGNOSIS — R33.9 URINARY RETENTION: ICD-10-CM

## 2024-01-01 DIAGNOSIS — M85.80 OSTEOPENIA, UNSPECIFIED LOCATION: ICD-10-CM
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
## 2024-01-01 DIAGNOSIS — Z87.19 HISTORY OF GI BLEED: ICD-10-CM

## 2024-01-01 DIAGNOSIS — Z87.11 HISTORY OF PEPTIC ULCER DISEASE: ICD-10-CM

## 2024-01-01 DIAGNOSIS — R63.0 POOR APPETITE: Primary | ICD-10-CM

## 2024-01-01 DIAGNOSIS — R41.0 DELIRIUM: ICD-10-CM

## 2024-01-01 DIAGNOSIS — L89.101 PRESSURE INJURY OF BACK, STAGE 1: ICD-10-CM

## 2024-01-01 DIAGNOSIS — E78.5 HYPERLIPIDEMIA LDL GOAL <100: ICD-10-CM

## 2024-01-01 DIAGNOSIS — R79.89 TROPONIN LEVEL ELEVATED: ICD-10-CM
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
## 2024-01-01 DIAGNOSIS — S41.112A SKIN TEAR OF LEFT UPPER ARM WITHOUT COMPLICATION, INITIAL ENCOUNTER: ICD-10-CM

## 2024-01-01 DIAGNOSIS — R53.1 WEAKNESS: ICD-10-CM

## 2024-01-01 DIAGNOSIS — I10 ESSENTIAL HYPERTENSION: ICD-10-CM

## 2024-01-01 DIAGNOSIS — R40.4 TRANSIENT ALTERATION OF AWARENESS: ICD-10-CM

## 2024-01-01 DIAGNOSIS — R44.3 HALLUCINATIONS: ICD-10-CM

## 2024-01-01 LAB
A-TOCOPHEROL VIT E SERPL-MCNC: 7.8 MG/L
ALBUMIN SERPL BCG-MCNC: 3.3 G/DL (ref 3.5–5.2)
ALBUMIN SERPL BCG-MCNC: 3.7 G/DL (ref 3.5–5.2)
ALBUMIN UR-MCNC: NEGATIVE MG/DL
ALBUMIN UR-MCNC: NEGATIVE MG/DL
ALP SERPL-CCNC: 61 U/L (ref 40–150)
ALP SERPL-CCNC: 70 U/L (ref 40–150)
ALT SERPL W P-5'-P-CCNC: 16 U/L (ref 0–50)
ALT SERPL W P-5'-P-CCNC: 23 U/L (ref 0–50)
AMMONIA PLAS-SCNC: 12 UMOL/L (ref 11–51)
ANION GAP SERPL CALCULATED.3IONS-SCNC: 11 MMOL/L (ref 7–15)
ANION GAP SERPL CALCULATED.3IONS-SCNC: 12 MMOL/L (ref 7–15)
ANION GAP SERPL CALCULATED.3IONS-SCNC: 13 MMOL/L (ref 7–15)
ANION GAP SERPL CALCULATED.3IONS-SCNC: 14 MMOL/L (ref 7–15)
ANION GAP SERPL CALCULATED.3IONS-SCNC: 15 MMOL/L (ref 7–15)
ANION GAP SERPL CALCULATED.3IONS-SCNC: 16 MMOL/L (ref 7–15)
ANION GAP SERPL CALCULATED.3IONS-SCNC: 9 MMOL/L (ref 7–15)
APPEARANCE UR: CLEAR
APPEARANCE UR: CLEAR
AST SERPL W P-5'-P-CCNC: 41 U/L (ref 0–45)
AST SERPL W P-5'-P-CCNC: 66 U/L (ref 0–45)
ATRIAL RATE - MUSE: 83 BPM
BACTERIA #/AREA URNS HPF: ABNORMAL /HPF
BACTERIA UR CULT: ABNORMAL
BASOPHILS # BLD AUTO: 0 10E3/UL (ref 0–0.2)
BASOPHILS # BLD AUTO: ABNORMAL 10*3/UL
BASOPHILS # BLD MANUAL: 0 10E3/UL (ref 0–0.2)
BASOPHILS NFR BLD AUTO: 0 %
BASOPHILS NFR BLD AUTO: ABNORMAL %
BASOPHILS NFR BLD MANUAL: 0 %
BETA+GAMMA TOCOPHEROL SERPL-MCNC: 1.4 MG/L
BILIRUB SERPL-MCNC: 0.5 MG/DL
BILIRUB SERPL-MCNC: 0.5 MG/DL
BILIRUB UR QL STRIP: NEGATIVE
BILIRUB UR QL STRIP: NEGATIVE
BUN SERPL-MCNC: 11.7 MG/DL (ref 8–23)
BUN SERPL-MCNC: 13.3 MG/DL (ref 8–23)
BUN SERPL-MCNC: 13.5 MG/DL (ref 8–23)
BUN SERPL-MCNC: 14.3 MG/DL (ref 8–23)
BUN SERPL-MCNC: 16.8 MG/DL (ref 8–23)
BUN SERPL-MCNC: 69.4 MG/DL (ref 8–23)
BUN SERPL-MCNC: 9.9 MG/DL (ref 8–23)
CALCIUM SERPL-MCNC: 8.5 MG/DL (ref 8.2–9.6)
CALCIUM SERPL-MCNC: 8.5 MG/DL (ref 8.2–9.6)
CALCIUM SERPL-MCNC: 8.7 MG/DL (ref 8.2–9.6)
CALCIUM SERPL-MCNC: 8.7 MG/DL (ref 8.2–9.6)
CALCIUM SERPL-MCNC: 9 MG/DL (ref 8.2–9.6)
CALCIUM SERPL-MCNC: 9.5 MG/DL (ref 8.2–9.6)
CALCIUM SERPL-MCNC: 9.9 MG/DL (ref 8.2–9.6)
CAOX CRY #/AREA URNS HPF: ABNORMAL /HPF
CHLORIDE SERPL-SCNC: 100 MMOL/L (ref 98–107)
CHLORIDE SERPL-SCNC: 103 MMOL/L (ref 98–107)
CHLORIDE SERPL-SCNC: 104 MMOL/L (ref 98–107)
CHLORIDE SERPL-SCNC: 105 MMOL/L (ref 98–107)
CHLORIDE SERPL-SCNC: 106 MMOL/L (ref 98–107)
CHLORIDE SERPL-SCNC: 108 MMOL/L (ref 98–107)
CHLORIDE SERPL-SCNC: 109 MMOL/L (ref 98–107)
CK SERPL-CCNC: 1158 U/L (ref 26–192)
CK SERPL-CCNC: 343 U/L (ref 26–192)
CK SERPL-CCNC: 566 U/L (ref 26–192)
CK SERPL-CCNC: 687 U/L (ref 26–192)
CK SERPL-CCNC: 689 U/L (ref 26–192)
COLOR UR AUTO: ABNORMAL
COLOR UR AUTO: ABNORMAL
CREAT SERPL-MCNC: 0.46 MG/DL (ref 0.51–0.95)
CREAT SERPL-MCNC: 0.55 MG/DL (ref 0.51–0.95)
CREAT SERPL-MCNC: 0.56 MG/DL (ref 0.51–0.95)
CREAT SERPL-MCNC: 0.57 MG/DL (ref 0.51–0.95)
CREAT SERPL-MCNC: 0.58 MG/DL (ref 0.51–0.95)
CREAT SERPL-MCNC: 0.63 MG/DL (ref 0.51–0.95)
CREAT SERPL-MCNC: 1.37 MG/DL (ref 0.51–0.95)
DEPRECATED HCO3 PLAS-SCNC: 18 MMOL/L (ref 22–29)
DEPRECATED HCO3 PLAS-SCNC: 18 MMOL/L (ref 22–29)
DEPRECATED HCO3 PLAS-SCNC: 20 MMOL/L (ref 22–29)
DEPRECATED HCO3 PLAS-SCNC: 21 MMOL/L (ref 22–29)
DEPRECATED HCO3 PLAS-SCNC: 22 MMOL/L (ref 22–29)
DEPRECATED HCO3 PLAS-SCNC: 22 MMOL/L (ref 22–29)
DEPRECATED HCO3 PLAS-SCNC: 23 MMOL/L (ref 22–29)
DIASTOLIC BLOOD PRESSURE - MUSE: NORMAL MMHG
EGFRCR SERPLBLD CKD-EPI 2021: 36 ML/MIN/1.73M2
EGFRCR SERPLBLD CKD-EPI 2021: 82 ML/MIN/1.73M2
EGFRCR SERPLBLD CKD-EPI 2021: 83 ML/MIN/1.73M2
EGFRCR SERPLBLD CKD-EPI 2021: 84 ML/MIN/1.73M2
EGFRCR SERPLBLD CKD-EPI 2021: 88 ML/MIN/1.73M2
EOSINOPHIL # BLD AUTO: 0 10E3/UL (ref 0–0.7)
EOSINOPHIL # BLD AUTO: ABNORMAL 10*3/UL
EOSINOPHIL # BLD MANUAL: 0 10E3/UL (ref 0–0.7)
EOSINOPHIL NFR BLD AUTO: 0 %
EOSINOPHIL NFR BLD AUTO: ABNORMAL %
EOSINOPHIL NFR BLD MANUAL: 0 %
ERYTHROCYTE [DISTWIDTH] IN BLOOD BY AUTOMATED COUNT: 14.9 % (ref 10–15)
ERYTHROCYTE [DISTWIDTH] IN BLOOD BY AUTOMATED COUNT: 15.1 % (ref 10–15)
ERYTHROCYTE [DISTWIDTH] IN BLOOD BY AUTOMATED COUNT: 15.1 % (ref 10–15)
ERYTHROCYTE [DISTWIDTH] IN BLOOD BY AUTOMATED COUNT: 15.5 % (ref 10–15)
FLUAV RNA SPEC QL NAA+PROBE: NEGATIVE
FLUBV RNA RESP QL NAA+PROBE: NEGATIVE
FOLATE SERPL-MCNC: 6.7 NG/ML (ref 4.6–34.8)
GLUCOSE BLDC GLUCOMTR-MCNC: 129 MG/DL (ref 70–99)
GLUCOSE BLDC GLUCOMTR-MCNC: 146 MG/DL (ref 70–99)
GLUCOSE BLDC GLUCOMTR-MCNC: 80 MG/DL (ref 70–99)
GLUCOSE SERPL-MCNC: 101 MG/DL (ref 70–99)
GLUCOSE SERPL-MCNC: 104 MG/DL (ref 70–99)
GLUCOSE SERPL-MCNC: 122 MG/DL (ref 70–99)
GLUCOSE SERPL-MCNC: 71 MG/DL (ref 70–99)
GLUCOSE SERPL-MCNC: 86 MG/DL (ref 70–99)
GLUCOSE SERPL-MCNC: 91 MG/DL (ref 70–99)
GLUCOSE SERPL-MCNC: 94 MG/DL (ref 70–99)
GLUCOSE UR STRIP-MCNC: NEGATIVE MG/DL
GLUCOSE UR STRIP-MCNC: NEGATIVE MG/DL
HCO3 BLDV-SCNC: 25 MMOL/L (ref 21–28)
HCT VFR BLD AUTO: 36.4 % (ref 35–47)
HCT VFR BLD AUTO: 37.6 % (ref 35–47)
HCT VFR BLD AUTO: 37.7 % (ref 35–47)
HCT VFR BLD AUTO: 40.3 % (ref 35–47)
HGB BLD-MCNC: 12 G/DL (ref 11.7–15.7)
HGB BLD-MCNC: 12.4 G/DL (ref 11.7–15.7)
HGB BLD-MCNC: 12.5 G/DL (ref 11.7–15.7)
HGB BLD-MCNC: 13.3 G/DL (ref 11.7–15.7)
HGB UR QL STRIP: ABNORMAL
HGB UR QL STRIP: NEGATIVE
HOLD SPECIMEN: NORMAL
HYALINE CASTS: 1 /LPF
IMM GRANULOCYTES # BLD: 0 10E3/UL
IMM GRANULOCYTES # BLD: ABNORMAL 10*3/UL
IMM GRANULOCYTES NFR BLD: 0 %
IMM GRANULOCYTES NFR BLD: ABNORMAL %
INTERPRETATION ECG - MUSE: NORMAL
KETONES UR STRIP-MCNC: NEGATIVE MG/DL
KETONES UR STRIP-MCNC: NEGATIVE MG/DL
LACTATE BLD-SCNC: 1.6 MMOL/L
LACTATE SERPL-SCNC: 1.2 MMOL/L (ref 0.7–2)
LACTATE SERPL-SCNC: 2.1 MMOL/L (ref 0.7–2)
LACTATE SERPL-SCNC: 4.2 MMOL/L (ref 0.7–2)
LEUKOCYTE ESTERASE UR QL STRIP: NEGATIVE
LEUKOCYTE ESTERASE UR QL STRIP: NEGATIVE
LVEF ECHO: NORMAL
LYMPHOCYTES # BLD AUTO: 1.2 10E3/UL (ref 0.8–5.3)
LYMPHOCYTES # BLD AUTO: ABNORMAL 10*3/UL
LYMPHOCYTES # BLD MANUAL: 0.9 10E3/UL (ref 0.8–5.3)
LYMPHOCYTES NFR BLD AUTO: 14 %
LYMPHOCYTES NFR BLD AUTO: ABNORMAL %
LYMPHOCYTES NFR BLD MANUAL: 12 %
MAGNESIUM SERPL-MCNC: 1.9 MG/DL (ref 1.7–2.3)
MCH RBC QN AUTO: 29.6 PG (ref 26.5–33)
MCH RBC QN AUTO: 30 PG (ref 26.5–33)
MCH RBC QN AUTO: 30.1 PG (ref 26.5–33)
MCH RBC QN AUTO: 30.1 PG (ref 26.5–33)
MCHC RBC AUTO-ENTMCNC: 33 G/DL (ref 31.5–36.5)
MCHC RBC AUTO-ENTMCNC: 33.2 G/DL (ref 31.5–36.5)
MCV RBC AUTO: 90 FL (ref 78–100)
MCV RBC AUTO: 91 FL (ref 78–100)
METHYLMALONATE SERPL-SCNC: 0.12 UMOL/L (ref 0–0.4)
MONOCYTES # BLD AUTO: 1.1 10E3/UL (ref 0–1.3)
MONOCYTES # BLD AUTO: ABNORMAL 10*3/UL
MONOCYTES # BLD MANUAL: 0.6 10E3/UL (ref 0–1.3)
MONOCYTES NFR BLD AUTO: 12 %
MONOCYTES NFR BLD AUTO: ABNORMAL %
MONOCYTES NFR BLD MANUAL: 7 %
MUCOUS THREADS #/AREA URNS LPF: PRESENT /LPF
NEUTROPHILS # BLD AUTO: 6.4 10E3/UL (ref 1.6–8.3)
NEUTROPHILS # BLD AUTO: ABNORMAL 10*3/UL
NEUTROPHILS # BLD MANUAL: 6.4 10E3/UL (ref 1.6–8.3)
NEUTROPHILS NFR BLD AUTO: 74 %
NEUTROPHILS NFR BLD AUTO: ABNORMAL %
NEUTROPHILS NFR BLD MANUAL: 81 %
NITRATE UR QL: NEGATIVE
NITRATE UR QL: NEGATIVE
NRBC # BLD AUTO: 0 10E3/UL
NRBC # BLD AUTO: 0 10E3/UL
NRBC BLD AUTO-RTO: 0 /100
NRBC BLD AUTO-RTO: 0 /100
P AXIS - MUSE: 90 DEGREES
PCO2 BLDV: 42 MM HG (ref 40–50)
PH BLDV: 7.39 [PH] (ref 7.32–7.43)
PH UR STRIP: 5.5 [PH] (ref 5–7)
PH UR STRIP: 5.5 [PH] (ref 5–7)
PLAT MORPH BLD: NORMAL
PLATELET # BLD AUTO: 222 10E3/UL (ref 150–450)
PLATELET # BLD AUTO: 230 10E3/UL (ref 150–450)
PLATELET # BLD AUTO: 237 10E3/UL (ref 150–450)
PLATELET # BLD AUTO: 241 10E3/UL (ref 150–450)
PO2 BLDV: 37 MM HG (ref 25–47)
POTASSIUM SERPL-SCNC: 3.2 MMOL/L (ref 3.4–5.3)
POTASSIUM SERPL-SCNC: 3.6 MMOL/L (ref 3.4–5.3)
POTASSIUM SERPL-SCNC: 3.6 MMOL/L (ref 3.4–5.3)
POTASSIUM SERPL-SCNC: 3.7 MMOL/L (ref 3.4–5.3)
POTASSIUM SERPL-SCNC: 4.1 MMOL/L (ref 3.4–5.3)
POTASSIUM SERPL-SCNC: 4.2 MMOL/L (ref 3.4–5.3)
POTASSIUM SERPL-SCNC: 4.2 MMOL/L (ref 3.4–5.3)
PR INTERVAL - MUSE: 156 MS
PROCALCITONIN SERPL IA-MCNC: 0.07 NG/ML
PROT SERPL-MCNC: 5.6 G/DL (ref 6.4–8.3)
PROT SERPL-MCNC: 6.2 G/DL (ref 6.4–8.3)
PYRIDOXAL PHOS SERPL-SCNC: 21.2 NMOL/L
QRS DURATION - MUSE: 82 MS
QT - MUSE: 388 MS
QTC - MUSE: 455 MS
R AXIS - MUSE: -30 DEGREES
RBC # BLD AUTO: 3.99 10E6/UL (ref 3.8–5.2)
RBC # BLD AUTO: 4.15 10E6/UL (ref 3.8–5.2)
RBC # BLD AUTO: 4.19 10E6/UL (ref 3.8–5.2)
RBC # BLD AUTO: 4.43 10E6/UL (ref 3.8–5.2)
RBC MORPH BLD: NORMAL
RBC URINE: 1 /HPF
RBC URINE: 2 /HPF
RBC URINE: 77 /HPF
RSV RNA SPEC NAA+PROBE: NEGATIVE
SAO2 % BLDV: 69 % (ref 70–75)
SARS-COV-2 RNA RESP QL NAA+PROBE: NEGATIVE
SODIUM SERPL-SCNC: 137 MMOL/L (ref 135–145)
SODIUM SERPL-SCNC: 137 MMOL/L (ref 135–145)
SODIUM SERPL-SCNC: 138 MMOL/L (ref 135–145)
SODIUM SERPL-SCNC: 138 MMOL/L (ref 135–145)
SODIUM SERPL-SCNC: 139 MMOL/L (ref 135–145)
SODIUM SERPL-SCNC: 139 MMOL/L (ref 135–145)
SODIUM SERPL-SCNC: 141 MMOL/L (ref 135–145)
SP GR UR STRIP: 1.01 (ref 1–1.03)
SP GR UR STRIP: 1.01 (ref 1–1.03)
SQUAMOUS EPITHELIAL: <1 /HPF
SYSTOLIC BLOOD PRESSURE - MUSE: NORMAL MMHG
T AXIS - MUSE: 60 DEGREES
TRANSITIONAL EPI: 1 /HPF
TROPONIN T SERPL HS-MCNC: 44 NG/L
TROPONIN T SERPL HS-MCNC: 47 NG/L
TROPONIN T SERPL HS-MCNC: 50 NG/L
TSH SERPL DL<=0.005 MIU/L-ACNC: 1.6 UIU/ML (ref 0.3–4.2)
UROBILINOGEN UR STRIP-MCNC: NORMAL MG/DL
UROBILINOGEN UR STRIP-MCNC: NORMAL MG/DL
VENTRICULAR RATE- MUSE: 83 BPM
VIT B12 SERPL-MCNC: 471 PG/ML (ref 232–1245)
WBC # BLD AUTO: 7.7 10E3/UL (ref 4–11)
WBC # BLD AUTO: 7.8 10E3/UL (ref 4–11)
WBC # BLD AUTO: 7.9 10E3/UL (ref 4–11)
WBC # BLD AUTO: 8.7 10E3/UL (ref 4–11)
WBC CLUMPS #/AREA URNS HPF: PRESENT /HPF
WBC URINE: 1 /HPF
WBC URINE: 125 /HPF
WBC URINE: 2 /HPF

## 2024-01-01 PROCEDURE — 36415 COLL VENOUS BLD VENIPUNCTURE: CPT | Performed by: INTERNAL MEDICINE

## 2024-01-01 PROCEDURE — 99308 SBSQ NF CARE LOW MDM 20: CPT

## 2024-01-01 PROCEDURE — 258N000003 HC RX IP 258 OP 636: Performed by: INTERNAL MEDICINE

## 2024-01-01 PROCEDURE — 72125 CT NECK SPINE W/O DYE: CPT | Mod: ME

## 2024-01-01 PROCEDURE — 99233 SBSQ HOSP IP/OBS HIGH 50: CPT | Mod: 25 | Performed by: PHYSICIAN ASSISTANT

## 2024-01-01 PROCEDURE — 85027 COMPLETE CBC AUTOMATED: CPT | Performed by: PHYSICIAN ASSISTANT

## 2024-01-01 PROCEDURE — 250N000013 HC RX MED GY IP 250 OP 250 PS 637: Performed by: INTERNAL MEDICINE

## 2024-01-01 PROCEDURE — 82550 ASSAY OF CK (CPK): CPT | Performed by: INTERNAL MEDICINE

## 2024-01-01 PROCEDURE — 83605 ASSAY OF LACTIC ACID: CPT

## 2024-01-01 PROCEDURE — 99285 EMERGENCY DEPT VISIT HI MDM: CPT | Mod: 25

## 2024-01-01 PROCEDURE — 97535 SELF CARE MNGMENT TRAINING: CPT | Mod: GO

## 2024-01-01 PROCEDURE — 99418 PROLNG IP/OBS E/M EA 15 MIN: CPT | Performed by: PHYSICIAN ASSISTANT

## 2024-01-01 PROCEDURE — 81001 URINALYSIS AUTO W/SCOPE: CPT | Performed by: PHYSICIAN ASSISTANT

## 2024-01-01 PROCEDURE — 92610 EVALUATE SWALLOWING FUNCTION: CPT | Mod: GN

## 2024-01-01 PROCEDURE — 36415 COLL VENOUS BLD VENIPUNCTURE: CPT | Performed by: EMERGENCY MEDICINE

## 2024-01-01 PROCEDURE — 82607 VITAMIN B-12: CPT | Performed by: PSYCHIATRY & NEUROLOGY

## 2024-01-01 PROCEDURE — 999N000208 ECHOCARDIOGRAM COMPLETE

## 2024-01-01 PROCEDURE — 84207 ASSAY OF VITAMIN B-6: CPT | Performed by: PSYCHIATRY & NEUROLOGY

## 2024-01-01 PROCEDURE — 97530 THERAPEUTIC ACTIVITIES: CPT | Mod: GP

## 2024-01-01 PROCEDURE — 81015 MICROSCOPIC EXAM OF URINE: CPT | Mod: ORL | Performed by: INTERNAL MEDICINE

## 2024-01-01 PROCEDURE — 85014 HEMATOCRIT: CPT | Performed by: PHYSICIAN ASSISTANT

## 2024-01-01 PROCEDURE — 120N000001 HC R&B MED SURG/OB

## 2024-01-01 PROCEDURE — 82803 BLOOD GASES ANY COMBINATION: CPT

## 2024-01-01 PROCEDURE — 80048 BASIC METABOLIC PNL TOTAL CA: CPT | Performed by: HOSPITALIST

## 2024-01-01 PROCEDURE — 82550 ASSAY OF CK (CPK): CPT | Performed by: HOSPITALIST

## 2024-01-01 PROCEDURE — 99207 PR APP CREDIT; MD BILLING SHARED VISIT: CPT | Performed by: NURSE PRACTITIONER

## 2024-01-01 PROCEDURE — 82550 ASSAY OF CK (CPK): CPT | Performed by: EMERGENCY MEDICINE

## 2024-01-01 PROCEDURE — 82550 ASSAY OF CK (CPK): CPT

## 2024-01-01 PROCEDURE — 83921 ORGANIC ACID SINGLE QUANT: CPT | Performed by: PSYCHIATRY & NEUROLOGY

## 2024-01-01 PROCEDURE — 84443 ASSAY THYROID STIM HORMONE: CPT | Performed by: PSYCHIATRY & NEUROLOGY

## 2024-01-01 PROCEDURE — P9604 ONE-WAY ALLOW PRORATED TRIP: HCPCS | Performed by: INTERNAL MEDICINE

## 2024-01-01 PROCEDURE — 99309 SBSQ NF CARE MODERATE MDM 30: CPT | Mod: GV

## 2024-01-01 PROCEDURE — G0378 HOSPITAL OBSERVATION PER HR: HCPCS

## 2024-01-01 PROCEDURE — 99232 SBSQ HOSP IP/OBS MODERATE 35: CPT | Performed by: HOSPITALIST

## 2024-01-01 PROCEDURE — 36415 COLL VENOUS BLD VENIPUNCTURE: CPT | Performed by: PSYCHIATRY & NEUROLOGY

## 2024-01-01 PROCEDURE — 80053 COMPREHEN METABOLIC PANEL: CPT

## 2024-01-01 PROCEDURE — 71046 X-RAY EXAM CHEST 2 VIEWS: CPT

## 2024-01-01 PROCEDURE — 36415 COLL VENOUS BLD VENIPUNCTURE: CPT | Performed by: HOSPITALIST

## 2024-01-01 PROCEDURE — 99309 SBSQ NF CARE MODERATE MDM 30: CPT

## 2024-01-01 PROCEDURE — 84484 ASSAY OF TROPONIN QUANT: CPT | Performed by: EMERGENCY MEDICINE

## 2024-01-01 PROCEDURE — 80053 COMPREHEN METABOLIC PANEL: CPT | Performed by: EMERGENCY MEDICINE

## 2024-01-01 PROCEDURE — 36415 COLL VENOUS BLD VENIPUNCTURE: CPT | Performed by: PHYSICIAN ASSISTANT

## 2024-01-01 PROCEDURE — 97161 PT EVAL LOW COMPLEX 20 MIN: CPT | Mod: GP

## 2024-01-01 PROCEDURE — 258N000003 HC RX IP 258 OP 636: Performed by: HOSPITALIST

## 2024-01-01 PROCEDURE — 72170 X-RAY EXAM OF PELVIS: CPT

## 2024-01-01 PROCEDURE — 87637 SARSCOV2&INF A&B&RSV AMP PRB: CPT | Performed by: NURSE PRACTITIONER

## 2024-01-01 PROCEDURE — 82550 ASSAY OF CK (CPK): CPT | Performed by: PHYSICIAN ASSISTANT

## 2024-01-01 PROCEDURE — 99222 1ST HOSP IP/OBS MODERATE 55: CPT | Mod: AI | Performed by: INTERNAL MEDICINE

## 2024-01-01 PROCEDURE — 255N000002 HC RX 255 OP 636: Performed by: INTERNAL MEDICINE

## 2024-01-01 PROCEDURE — 83735 ASSAY OF MAGNESIUM: CPT | Performed by: EMERGENCY MEDICINE

## 2024-01-01 PROCEDURE — 93306 TTE W/DOPPLER COMPLETE: CPT | Mod: 26 | Performed by: INTERNAL MEDICINE

## 2024-01-01 PROCEDURE — 258N000003 HC RX IP 258 OP 636

## 2024-01-01 PROCEDURE — 250N000013 HC RX MED GY IP 250 OP 250 PS 637: Performed by: HOSPITALIST

## 2024-01-01 PROCEDURE — 80048 BASIC METABOLIC PNL TOTAL CA: CPT | Performed by: INTERNAL MEDICINE

## 2024-01-01 PROCEDURE — 93005 ELECTROCARDIOGRAM TRACING: CPT

## 2024-01-01 PROCEDURE — 92526 ORAL FUNCTION THERAPY: CPT | Mod: GN

## 2024-01-01 PROCEDURE — 83605 ASSAY OF LACTIC ACID: CPT | Performed by: PHYSICIAN ASSISTANT

## 2024-01-01 PROCEDURE — 71045 X-RAY EXAM CHEST 1 VIEW: CPT

## 2024-01-01 PROCEDURE — 80048 BASIC METABOLIC PNL TOTAL CA: CPT

## 2024-01-01 PROCEDURE — 99291 CRITICAL CARE FIRST HOUR: CPT

## 2024-01-01 PROCEDURE — 82746 ASSAY OF FOLIC ACID SERUM: CPT | Performed by: PSYCHIATRY & NEUROLOGY

## 2024-01-01 PROCEDURE — 95816 EEG AWAKE AND DROWSY: CPT

## 2024-01-01 PROCEDURE — 87186 SC STD MICRODIL/AGAR DIL: CPT | Mod: ORL | Performed by: INTERNAL MEDICINE

## 2024-01-01 PROCEDURE — 36415 COLL VENOUS BLD VENIPUNCTURE: CPT

## 2024-01-01 PROCEDURE — 85027 COMPLETE CBC AUTOMATED: CPT

## 2024-01-01 PROCEDURE — 84484 ASSAY OF TROPONIN QUANT: CPT | Performed by: INTERNAL MEDICINE

## 2024-01-01 PROCEDURE — 82962 GLUCOSE BLOOD TEST: CPT

## 2024-01-01 PROCEDURE — G0463 HOSPITAL OUTPT CLINIC VISIT: HCPCS

## 2024-01-01 PROCEDURE — 70450 CT HEAD/BRAIN W/O DYE: CPT | Mod: MG

## 2024-01-01 PROCEDURE — 82140 ASSAY OF AMMONIA: CPT | Performed by: PSYCHIATRY & NEUROLOGY

## 2024-01-01 PROCEDURE — C8929 TTE W OR WO FOL WCON,DOPPLER: HCPCS

## 2024-01-01 PROCEDURE — 85025 COMPLETE CBC W/AUTO DIFF WBC: CPT | Performed by: EMERGENCY MEDICINE

## 2024-01-01 PROCEDURE — 99239 HOSP IP/OBS DSCHRG MGMT >30: CPT | Performed by: HOSPITALIST

## 2024-01-01 PROCEDURE — 81001 URINALYSIS AUTO W/SCOPE: CPT | Performed by: EMERGENCY MEDICINE

## 2024-01-01 PROCEDURE — 97530 THERAPEUTIC ACTIVITIES: CPT | Mod: GO

## 2024-01-01 PROCEDURE — 85007 BL SMEAR W/DIFF WBC COUNT: CPT | Performed by: PHYSICIAN ASSISTANT

## 2024-01-01 PROCEDURE — 87086 URINE CULTURE/COLONY COUNT: CPT | Mod: ORL | Performed by: INTERNAL MEDICINE

## 2024-01-01 PROCEDURE — 84446 ASSAY OF VITAMIN E: CPT | Performed by: PSYCHIATRY & NEUROLOGY

## 2024-01-01 PROCEDURE — 84145 PROCALCITONIN (PCT): CPT | Performed by: NURSE PRACTITIONER

## 2024-01-01 PROCEDURE — 97165 OT EVAL LOW COMPLEX 30 MIN: CPT | Mod: GO

## 2024-01-01 PROCEDURE — 80048 BASIC METABOLIC PNL TOTAL CA: CPT | Performed by: PHYSICIAN ASSISTANT

## 2024-01-01 RX ORDER — QUETIAPINE FUMARATE 25 MG/1
12.5 TABLET, FILM COATED ORAL EVERY 6 HOURS PRN
DISCHARGE
Start: 2024-01-01

## 2024-01-01 RX ORDER — ONDANSETRON 4 MG/1
4 TABLET, ORALLY DISINTEGRATING ORAL EVERY 6 HOURS PRN
Status: DISCONTINUED | OUTPATIENT
Start: 2024-01-01 | End: 2024-01-01 | Stop reason: HOSPADM

## 2024-01-01 RX ORDER — AMLODIPINE BESYLATE 10 MG/1
10 TABLET ORAL DAILY
Status: DISCONTINUED | OUTPATIENT
Start: 2024-01-01 | End: 2024-01-01

## 2024-01-01 RX ORDER — POTASSIUM CHLORIDE 1500 MG/1
40 TABLET, EXTENDED RELEASE ORAL 2 TIMES DAILY
Status: COMPLETED | OUTPATIENT
Start: 2024-01-01 | End: 2024-01-01

## 2024-01-01 RX ORDER — SODIUM CHLORIDE 9 MG/ML
INJECTION, SOLUTION INTRAVENOUS CONTINUOUS
Status: DISCONTINUED | OUTPATIENT
Start: 2024-01-01 | End: 2024-01-01

## 2024-01-01 RX ORDER — ACETAMINOPHEN 325 MG/1
650 TABLET ORAL EVERY 4 HOURS PRN
Status: DISCONTINUED | OUTPATIENT
Start: 2024-01-01 | End: 2024-01-01 | Stop reason: HOSPADM

## 2024-01-01 RX ORDER — LISINOPRIL 40 MG/1
40 TABLET ORAL DAILY
Status: DISCONTINUED | OUTPATIENT
Start: 2024-01-01 | End: 2024-01-01

## 2024-01-01 RX ORDER — AMOXICILLIN 250 MG
2 CAPSULE ORAL 2 TIMES DAILY PRN
Status: DISCONTINUED | OUTPATIENT
Start: 2024-01-01 | End: 2024-01-01 | Stop reason: HOSPADM

## 2024-01-01 RX ORDER — ONDANSETRON 2 MG/ML
4 INJECTION INTRAMUSCULAR; INTRAVENOUS EVERY 6 HOURS PRN
Status: DISCONTINUED | OUTPATIENT
Start: 2024-01-01 | End: 2024-01-01 | Stop reason: HOSPADM

## 2024-01-01 RX ORDER — ACETAMINOPHEN 650 MG/1
650 SUPPOSITORY RECTAL EVERY 4 HOURS PRN
Status: DISCONTINUED | OUTPATIENT
Start: 2024-01-01 | End: 2024-01-01 | Stop reason: HOSPADM

## 2024-01-01 RX ORDER — SODIUM CHLORIDE, SODIUM LACTATE, POTASSIUM CHLORIDE, CALCIUM CHLORIDE 600; 310; 30; 20 MG/100ML; MG/100ML; MG/100ML; MG/100ML
INJECTION, SOLUTION INTRAVENOUS CONTINUOUS
Status: DISCONTINUED | OUTPATIENT
Start: 2024-01-01 | End: 2024-01-01

## 2024-01-01 RX ORDER — METOPROLOL TARTRATE 50 MG
50 TABLET ORAL 2 TIMES DAILY
Status: DISCONTINUED | OUTPATIENT
Start: 2024-01-01 | End: 2024-01-01 | Stop reason: HOSPADM

## 2024-01-01 RX ORDER — AMLODIPINE BESYLATE 10 MG/1
10 TABLET ORAL DAILY
Status: DISCONTINUED | OUTPATIENT
Start: 2024-01-01 | End: 2024-01-01 | Stop reason: HOSPADM

## 2024-01-01 RX ORDER — AMOXICILLIN 250 MG
1 CAPSULE ORAL 2 TIMES DAILY PRN
Status: DISCONTINUED | OUTPATIENT
Start: 2024-01-01 | End: 2024-01-01 | Stop reason: HOSPADM

## 2024-01-01 RX ORDER — LISINOPRIL 40 MG/1
40 TABLET ORAL DAILY
Status: DISCONTINUED | OUTPATIENT
Start: 2024-01-01 | End: 2024-01-01 | Stop reason: HOSPADM

## 2024-01-01 RX ADMIN — SODIUM CHLORIDE: 9 INJECTION, SOLUTION INTRAVENOUS at 06:27

## 2024-01-01 RX ADMIN — SODIUM CHLORIDE: 9 INJECTION, SOLUTION INTRAVENOUS at 20:51

## 2024-01-01 RX ADMIN — SODIUM CHLORIDE 500 ML: 9 INJECTION, SOLUTION INTRAVENOUS at 05:20

## 2024-01-01 RX ADMIN — METOPROLOL TARTRATE 50 MG: 50 TABLET, FILM COATED ORAL at 20:37

## 2024-01-01 RX ADMIN — HUMAN ALBUMIN MICROSPHERES AND PERFLUTREN 9 ML: 10; .22 INJECTION, SOLUTION INTRAVENOUS at 09:34

## 2024-01-01 RX ADMIN — AMLODIPINE BESYLATE 10 MG: 10 TABLET ORAL at 08:31

## 2024-01-01 RX ADMIN — SODIUM CHLORIDE: 9 INJECTION, SOLUTION INTRAVENOUS at 14:48

## 2024-01-01 RX ADMIN — POTASSIUM CHLORIDE 40 MEQ: 1500 TABLET, EXTENDED RELEASE ORAL at 21:19

## 2024-01-01 RX ADMIN — POTASSIUM CHLORIDE 40 MEQ: 1500 TABLET, EXTENDED RELEASE ORAL at 10:50

## 2024-01-01 RX ADMIN — SODIUM CHLORIDE, POTASSIUM CHLORIDE, SODIUM LACTATE AND CALCIUM CHLORIDE: 600; 310; 30; 20 INJECTION, SOLUTION INTRAVENOUS at 05:40

## 2024-01-01 RX ADMIN — METOPROLOL TARTRATE 50 MG: 50 TABLET, FILM COATED ORAL at 08:31

## 2024-01-01 RX ADMIN — METOPROLOL TARTRATE 50 MG: 50 TABLET, FILM COATED ORAL at 08:16

## 2024-01-01 RX ADMIN — LISINOPRIL 40 MG: 40 TABLET ORAL at 16:26

## 2024-01-01 RX ADMIN — LISINOPRIL 40 MG: 40 TABLET ORAL at 08:31

## 2024-01-01 RX ADMIN — METOPROLOL TARTRATE 50 MG: 50 TABLET, FILM COATED ORAL at 19:55

## 2024-01-01 RX ADMIN — AMLODIPINE BESYLATE 10 MG: 10 TABLET ORAL at 16:26

## 2024-01-01 RX ADMIN — ACETAMINOPHEN 650 MG: 325 TABLET, FILM COATED ORAL at 08:19

## 2024-01-01 RX ADMIN — ACETAMINOPHEN 650 MG: 325 TABLET, FILM COATED ORAL at 14:15

## 2024-01-01 RX ADMIN — SODIUM CHLORIDE: 9 INJECTION, SOLUTION INTRAVENOUS at 00:32

## 2024-01-01 RX ADMIN — Medication 1 MG: at 19:44

## 2024-01-01 RX ADMIN — METOPROLOL TARTRATE 50 MG: 50 TABLET, FILM COATED ORAL at 08:19

## 2024-01-01 RX ADMIN — SODIUM CHLORIDE: 9 INJECTION, SOLUTION INTRAVENOUS at 11:17

## 2024-01-01 RX ADMIN — METOPROLOL TARTRATE 50 MG: 50 TABLET, FILM COATED ORAL at 09:19

## 2024-01-01 RX ADMIN — SODIUM CHLORIDE, POTASSIUM CHLORIDE, SODIUM LACTATE AND CALCIUM CHLORIDE: 600; 310; 30; 20 INJECTION, SOLUTION INTRAVENOUS at 17:06

## 2024-01-01 RX ADMIN — AMLODIPINE BESYLATE 10 MG: 10 TABLET ORAL at 08:16

## 2024-01-01 RX ADMIN — METOPROLOL TARTRATE 50 MG: 50 TABLET, FILM COATED ORAL at 12:22

## 2024-01-01 RX ADMIN — METOPROLOL TARTRATE 50 MG: 50 TABLET, FILM COATED ORAL at 19:41

## 2024-01-01 RX ADMIN — METOPROLOL TARTRATE 50 MG: 50 TABLET, FILM COATED ORAL at 21:19

## 2024-01-01 ASSESSMENT — ACTIVITIES OF DAILY LIVING (ADL)
ADLS_ACUITY_SCORE: 50
ADLS_ACUITY_SCORE: 49
ADLS_ACUITY_SCORE: 35
ADLS_ACUITY_SCORE: 47
ADLS_ACUITY_SCORE: 50
ADLS_ACUITY_SCORE: 45
ADLS_ACUITY_SCORE: 35
ADLS_ACUITY_SCORE: 49
ADLS_ACUITY_SCORE: 47
ADLS_ACUITY_SCORE: 45
ADLS_ACUITY_SCORE: 45
ADLS_ACUITY_SCORE: 50
ADLS_ACUITY_SCORE: 50
ADLS_ACUITY_SCORE: 49
ADLS_ACUITY_SCORE: 45
ADLS_ACUITY_SCORE: 35
ADLS_ACUITY_SCORE: 45
ADLS_ACUITY_SCORE: 49
ADLS_ACUITY_SCORE: 51
ADLS_ACUITY_SCORE: 45
ADLS_ACUITY_SCORE: 50
ADLS_ACUITY_SCORE: 50
ADLS_ACUITY_SCORE: 49
ADLS_ACUITY_SCORE: 50
ADLS_ACUITY_SCORE: 50
ADLS_ACUITY_SCORE: 49
ADLS_ACUITY_SCORE: 51
ADLS_ACUITY_SCORE: 49
ADLS_ACUITY_SCORE: 50
ADLS_ACUITY_SCORE: 49
ADLS_ACUITY_SCORE: 49
ADLS_ACUITY_SCORE: 35
ADLS_ACUITY_SCORE: 50
ADLS_ACUITY_SCORE: 49
ADLS_ACUITY_SCORE: 35
ADLS_ACUITY_SCORE: 49
ADLS_ACUITY_SCORE: 50
ADLS_ACUITY_SCORE: 50
ADLS_ACUITY_SCORE: 45
ADLS_ACUITY_SCORE: 50
ADLS_ACUITY_SCORE: 50
ADLS_ACUITY_SCORE: 49
ADLS_ACUITY_SCORE: 49
ADLS_ACUITY_SCORE: 45
ADLS_ACUITY_SCORE: 35
ADLS_ACUITY_SCORE: 50
ADLS_ACUITY_SCORE: 35
ADLS_ACUITY_SCORE: 50
ADLS_ACUITY_SCORE: 47
ADLS_ACUITY_SCORE: 49
ADLS_ACUITY_SCORE: 50
ADLS_ACUITY_SCORE: 49
ADLS_ACUITY_SCORE: 49
ADLS_ACUITY_SCORE: 50
DEPENDENT_IADLS:: INDEPENDENT
ADLS_ACUITY_SCORE: 49
ADLS_ACUITY_SCORE: 49
ADLS_ACUITY_SCORE: 50
ADLS_ACUITY_SCORE: 35
ADLS_ACUITY_SCORE: 47
ADLS_ACUITY_SCORE: 50
ADLS_ACUITY_SCORE: 49
ADLS_ACUITY_SCORE: 35
ADLS_ACUITY_SCORE: 35
ADLS_ACUITY_SCORE: 50
ADLS_ACUITY_SCORE: 50
ADLS_ACUITY_SCORE: 49
ADLS_ACUITY_SCORE: 50
ADLS_ACUITY_SCORE: 49
ADLS_ACUITY_SCORE: 45
ADLS_ACUITY_SCORE: 50
ADLS_ACUITY_SCORE: 50
ADLS_ACUITY_SCORE: 49
ADLS_ACUITY_SCORE: 45
ADLS_ACUITY_SCORE: 49
ADLS_ACUITY_SCORE: 50
ADLS_ACUITY_SCORE: 49
ADLS_ACUITY_SCORE: 45
ADLS_ACUITY_SCORE: 49
ADLS_ACUITY_SCORE: 49
ADLS_ACUITY_SCORE: 50
ADLS_ACUITY_SCORE: 50
ADLS_ACUITY_SCORE: 49
ADLS_ACUITY_SCORE: 49
ADLS_ACUITY_SCORE: 50

## 2024-01-01 ASSESSMENT — COLUMBIA-SUICIDE SEVERITY RATING SCALE - C-SSRS
1. IN THE PAST MONTH, HAVE YOU WISHED YOU WERE DEAD OR WISHED YOU COULD GO TO SLEEP AND NOT WAKE UP?: NO
2. HAVE YOU ACTUALLY HAD ANY THOUGHTS OF KILLING YOURSELF IN THE PAST MONTH?: NO
6. HAVE YOU EVER DONE ANYTHING, STARTED TO DO ANYTHING, OR PREPARED TO DO ANYTHING TO END YOUR LIFE?: NO

## 2024-03-02 PROBLEM — R40.4 TRANSIENT ALTERATION OF AWARENESS: Status: ACTIVE | Noted: 2024-01-01

## 2024-03-02 PROBLEM — R79.89 TROPONIN LEVEL ELEVATED: Status: ACTIVE | Noted: 2024-01-01

## 2024-03-02 NOTE — ED TRIAGE NOTES
More confused than normal per niece.  On arrival to ED back to baseline.  Per EMS pt tripped  while walking up to bathroom.  Found by niece on floor on back.  Denies pain or LOC .       Triage Assessment (Adult)       Row Name 03/02/24 0542          Triage Assessment    Airway WDL WDL        Respiratory WDL    Respiratory WDL WDL        Skin Circulation/Temperature WDL    Skin Circulation/Temperature WDL X  abrasions to bilat elbows        Cardiac WDL    Cardiac Rhythm NSR        Peripheral/Neurovascular WDL    Peripheral Neurovascular WDL WDL        Cognitive/Neuro/Behavioral WDL    Cognitive/Neuro/Behavioral WDL X  baseline confusion        Pupils (CN II)    Pupil PERRLA yes     Pupil Size Left 2 mm     Pupil Size Right 2 mm        Dain Coma Scale    Best Eye Response 4-->(E4) spontaneous     Best Motor Response 6-->(M6) obeys commands     Best Verbal Response 4-->(V4) confused     Dain Coma Scale Score 14

## 2024-03-02 NOTE — ED PROVIDER NOTES
History     Chief Complaint:  Fall (More confused than normal per niece.  On arrival to ED back to baseline.  Per EMS pt tripped  while walking up to bathroom.  Found by niece on floor on back.  Denies pain or LOC .  )       The history is provided by the patient and a relative (Daughter).      Lori Fall is a 89 year old female with a history of hypertension, who presents to the ED following a fall approximately 2 hours ago. Per triage note, patient tripped and fell while walking up to the bathroom. Patient reports she cannot recall how exactly she fell. She currently denies any pain, including neck, hip, abdominal, pelvic, chest or back pain. Denies any vision changes, fever, chills, sweats, headache, cough or dysuria.     Independent Historian:   Daughter reports she  found Lori on the floor laying on her back. Daughter reports patient's memory has been worsening, but notes that she is more confused than normal today. She states Lori complained of back pain when she arrived at her house, but has not complained about it since. Denies prior falls in the past.     Review of External Notes:   I reviewed primary care note [id number] passed her functional screen, she had a normal cognitive screen, followed for hypertension and hyperlipidemia. History of GI bleed with gastric ulcer.     Medications:    Fosamax  Norvasc  Zetia  Triglide  Zestril  Lopressor  Prilosec  Zocor    Past Medical History:    Hypertension  Hyperlipidemia  Impaired fasting glucose  Osteopenia  Primary thrombocytopenia  Peptic ulcer disease  Pure hypercholesterolemia    Past Surgical History:    EGD  Tonsillectomy  Splenectomy  Appendectomy  Benign breast biopsy  Left total hip arthroplasty, Bijal cemented components     Physical Exam   Patient Vitals for the past 24 hrs:   BP Temp Temp src Pulse Resp SpO2 Weight   03/02/24 1325 (!) 144/82 98.2  F (36.8  C) Oral 66 19 96 % 56.3 kg (124 lb 1.9 oz)   03/02/24 1300 (!) 165/87 -- --  73 19 96 % --   03/02/24 1200 (!) 157/87 -- -- 80 11 97 % --   03/02/24 1145 (!) 162/88 -- -- 80 (!) 7 96 % --   03/02/24 1130 (!) 172/86 -- -- 80 16 -- --   03/02/24 0542 (!) 151/73 -- -- -- -- 95 % --   03/02/24 0540 (!) 151/73 -- -- 82 -- 96 % --   03/02/24 0537 -- -- -- -- -- 95 % --   03/02/24 0536 (!) 151/73 98.1  F (36.7  C) Oral 82 17 95 % --   03/02/24 0535 (!) 151/70 -- -- -- -- 95 % --   03/02/24 0529 -- -- -- -- -- 95 % --      Physical Exam  General: Laying on the ED bed  HEENT: Normocephalic, atraumatic  Cardiac: Radial and DP pulses 2+, regular rate and rhythm  Pulm: Breathing comfortably, no accessory muscle usage, no conversational dyspnea, and lungs clear bilaterally  GI: Abdomen soft, nontender, no rigidity or guarding  MSK: C/T/L-spine nontender to palpation, no step-offs.  Anterior chest wall and posterior thorax nontender to palpation.  Pelvis stable.  Extremities x4 without bony deformity, no instability, tenderness to palpation, or painful range of motion.  Skin: Warm and dry, superficial skin tear over the right forearm, superficial abrasions scattered over the left upper arm and forearm  Neuro: Awake and alert, answers questions appropriately, moves all extremities  Psych: Pleasant mood and affect    Emergency Department Course   ECG  ECG taken at 0543, ECG read at 0647  Sinus rhythm. Left axis deviation. Minimal voltage criteria for LVH, may be normal variant (Kohler product). Abnormal ECG.   Nonspecific T wave abnormality no longer evident in inferior leads as compared to prior, dated 05/06/17.  Rate 83 bpm. DC interval 156 ms. QRS duration 82 ms. QT/QTc 388/455 ms. P-R-T axes 90 -30 60.     Imaging:  XR Pelvis 1/2 Views   Final Result   IMPRESSION: Postoperative changes bilateral total hip arthroplasty. Components appear well seated. Pelvis negative for fracture. Degenerative change lower lumbar spine with convex left lumbar curvature. Vascular calcifications. Diffuse demineralization.       XR Chest 2 Views   Final Result   IMPRESSION: No acute infiltrates. Moderate-sized hiatal hernia. No pneumothorax. No definite displaced rib fracture. Scoliosis.         CT Cervical Spine w/o Contrast   Final Result   IMPRESSION:   1.  Negative for fracture or traumatic malalignment.      2.  Slight increased density and widening of the left C3-C4 foramen. Differential considerations as. Likely incidental in the setting of trauma.      Head CT w/o contrast   Final Result   IMPRESSION:   1.  No CT evidence for acute intracranial process.   2.  Brain atrophy and presumed chronic microvascular ischemic changes as above.      Echocardiogram Complete    (Results Pending)      Laboratory:  Labs Ordered and Resulted from Time of ED Arrival to Time of ED Departure   TROPONIN T, HIGH SENSITIVITY - Abnormal       Result Value    Troponin T, High Sensitivity 47 (*)    ROUTINE UA WITH MICROSCOPIC - Abnormal    Color Urine Light Yellow      Appearance Urine Clear      Glucose Urine Negative      Bilirubin Urine Negative      Ketones Urine Negative      Specific Gravity Urine 1.008      Blood Urine Negative      pH Urine 5.5      Protein Albumin Urine Negative      Urobilinogen Urine Normal      Nitrite Urine Negative      Leukocyte Esterase Urine Negative      Mucus Urine Present (*)     RBC Urine 2      WBC Urine 1      Squamous Epithelials Urine <1     CK TOTAL - Abnormal     (*)    COMPREHENSIVE METABOLIC PANEL - Abnormal    Sodium 139      Potassium 3.6      Carbon Dioxide (CO2) 22      Anion Gap 13      Urea Nitrogen 16.8      Creatinine 0.57      GFR Estimate 84      Calcium 9.5      Chloride 104      Glucose 104 (*)     Alkaline Phosphatase 70      AST 41      ALT 16      Protein Total 6.2 (*)     Albumin 3.7      Bilirubin Total 0.5     ISTAT GASES LACTATE VENOUS POCT - Abnormal    Lactic Acid POCT 1.6      Bicarbonate Venous POCT 25      O2 Sat, Venous POCT 69 (*)     pCO2 Venous POCT 42      pH Venous POCT  7.39      pO2 Venous POCT 37     TROPONIN T, HIGH SENSITIVITY - Abnormal    Troponin T, High Sensitivity 50 (*)    TROPONIN T, HIGH SENSITIVITY - Abnormal    Troponin T, High Sensitivity 44 (*)    MAGNESIUM - Normal    Magnesium 1.9     CBC WITH PLATELETS AND DIFFERENTIAL    WBC Count 8.7      RBC Count 4.43      Hemoglobin 13.3      Hematocrit 40.3      MCV 91      MCH 30.0      MCHC 33.0      RDW 14.9      Platelet Count 241      % Neutrophils 74      % Lymphocytes 14      % Monocytes 12      % Eosinophils 0      % Basophils 0      % Immature Granulocytes 0      NRBCs per 100 WBC 0      Absolute Neutrophils 6.4      Absolute Lymphocytes 1.2      Absolute Monocytes 1.1      Absolute Eosinophils 0.0      Absolute Basophils 0.0      Absolute Immature Granulocytes 0.0      Absolute NRBCs 0.0        Procedures     Emergency Department Course & Assessments:    Interventions:  Medications   amLODIPine (NORVASC) tablet 10 mg (has no administration in time range)   lisinopril (ZESTRIL) tablet 40 mg (has no administration in time range)   metoprolol tartrate (LOPRESSOR) tablet 50 mg (50 mg Oral $Given 3/2/24 1222)   senna-docusate (SENOKOT-S/PERICOLACE) 8.6-50 MG per tablet 1 tablet (has no administration in time range)     Or   senna-docusate (SENOKOT-S/PERICOLACE) 8.6-50 MG per tablet 2 tablet (has no administration in time range)   ondansetron (ZOFRAN ODT) ODT tab 4 mg (has no administration in time range)     Or   ondansetron (ZOFRAN) injection 4 mg (has no administration in time range)   acetaminophen (TYLENOL) tablet 650 mg (has no administration in time range)     Or   acetaminophen (TYLENOL) Suppository 650 mg (has no administration in time range)   melatonin tablet 1 mg (has no administration in time range)   sodium chloride 0.9 % infusion (has no administration in time range)        Assessments:  0625 I obtained history and examined the patient as noted above.      Independent Interpretation (X-rays, CTs, rhythm  strip):  Chest x-ray shows no pneumothorax, no large pleural effusion, no lobar consolidation    Consultations/Discussion of Management or Tests:  1040 I spoke with Jose, of the hospitalist team, regarding the patient. They will accept the patient for admission.       Social Determinants of Health affecting care:   Social Connections/Isolation    Disposition:  The patient was admitted to the hospital under the care of Dr. Garcia.     Impression & Plan    CMS Diagnoses: None      Medical Decision Makin-year-old female presents after being found down, presumed fall of unknown etiology.  She has no painful complaints, vital signs are stable.  She is still somewhat confused above baseline according to her niece, though admittedly improved from when her niece was talking to her on the phone earlier.  No focal medical complaints on history either, so initiating screening imaging and lab work as documented above.  CT of the head shows no ICH.  CT C-spine, chest x-ray, pelvis x-ray shows no evidence of traumatic injury or other emergent finding.  Lab work shows an elevated troponin that remains elevated on repeat.  No acute EKG changes, no chest pain, heparinization not warranted.  I am concerned that today's encounter represents a syncopal episode followed by a fall with a troponin elevation.  Plan is for observation on the hospitalist service for further care.      Diagnosis:    ICD-10-CM    1. Troponin level elevated  R79.89       2. Transient alteration of awareness  R40.4                  Scribe Disclosure:  I, Elliedon Khan, am serving as a scribe at 6:15 AM on 3/2/2024 to document services personally performed by Baldev Mooney MD based on my observations and the provider's statements to me.   3/2/2024   Baldev Mooney MD King, Colin, MD  24 1410

## 2024-03-02 NOTE — PLAN OF CARE
Goal Outcome Evaluation:        Observation goals  PRIOR TO DISCHARGE        Comments: -diagnostic tests and consults completed and resulted Not met  -vital signs normal or at patient baseline  No Met  -tolerating oral intake to maintain hydration Not Met  -adequate pain control on oral analgesics  Met, denies pain  -safe disposition plan has been identified Not Met  Nurse to notify provider when observation goals have been met and patient is ready for discharge.

## 2024-03-02 NOTE — PROGRESS NOTES
RECEIVING UNIT ED HANDOFF REVIEW    ED Nurse Handoff Report was reviewed by: Ellen Davis RN on March 2, 2024 at 12:31 PM

## 2024-03-02 NOTE — PROGRESS NOTES
[facility]  ED Nurse Handoff Report    ED Chief complaint: Fall (More confused than normal per niece.  On arrival to ED back to baseline.  Per EMS pt tripped  while walking up to bathroom.  Found by niece on floor on back.  Denies pain or LOC .  )      ED Diagnosis:   Final diagnoses:   Troponin level elevated   Transient alteration of awareness       Code Status: DNR / DNI    Allergies:   Allergies   Allergen Reactions    Aminoglycosides      neosporin onintment - rash    Hctz Hives    Penicillins     Sulfa Antibiotics        Patient Story: Pt presents with mina for altered mental status. Pt called niece this morning after falling. Pt denies LOC, but does report lying at floor for unknown time. Pt able to tell EMS she tripped on way to bathroom this morning. Pt memory intact to past event, but confused about fall. Pt reports she has one walker which she made into two to bang on door. Niece reports she did not do this and only has one walker.  Focused Assessment:  + confusion and skin tears to bilat arms.     Treatments and/or interventions provided: Labs, xray, CT  Patient's response to treatments and/or interventions: Unchanged    To be done/followed up on inpatient unit: Serial Trops    Does this patient have any cognitive concerns?: Disoriented to situation    Activity level - Baseline/Home:  Walker  Activity Level - Current:   Walker    Patient's Preferred language: English   Needed?: No    Isolation: None  Infection: Not Applicable  Patient tested for COVID 19 prior to admission: NO  Bariatric?: No    Vital Signs:   Vitals:    03/02/24 0536 03/02/24 0537 03/02/24 0540 03/02/24 0542   BP: (!) 151/73  (!) 151/73 (!) 151/73   Pulse: 82  82    Resp: 17      Temp: 98.1  F (36.7  C)      TempSrc: Oral      SpO2: 95% 95% 96% 95%       Cardiac Rhythm:Cardiac Rhythm: Normal sinus rhythm    Was the PSS-3 completed:   Yes  What interventions are required if any?               Family  Comments: Niece at bedside  OBS brochure/video discussed/provided to patient/family: N/A              Name of person given brochure if not patient:                Relationship to patient: NA    For the majority of the shift this patient's behavior was Green.   Behavioral interventions performed were none needed.    ED NURSE PHONE NUMBER: [id number]

## 2024-03-02 NOTE — PHARMACY-ADMISSION MEDICATION HISTORY
Pharmacy Intern Admission Medication History    Admission medication history is complete. The information provided in this note is only as accurate as the sources available at the time of the update.    Information Source(s): Patient and CareEverywhere/SureScripts via in-person    Pertinent Information: None    Changes made to PTA medication list:  Added: None  Deleted: Alendronate, Omeprazole  Changed: None    Allergies reviewed with patient and updates made in EHR: yes    Medication History Completed By: Ana Lilia Cleveland 3/2/2024 10:54 AM    PTA Med List   Medication Sig Last Dose    amLODIPine (NORVASC) 10 MG tablet Take 1 tablet (10 mg) by mouth daily 3/1/2024 at AM    Calcium Carbonate-Vitamin D (CALCIUM + D) 600-200 MG-UNIT per tablet Take 2 tablets by mouth 2 times daily. 3/1/2024 at PM    ezetimibe (ZETIA) 10 MG tablet Take 1 tablet (10 mg) by mouth At Bedtime 3/1/2024 at PM    fenofibrate (TRIGLIDE/LOFIBRA) 160 MG tablet Take 1 tablet (160 mg) by mouth daily 3/1/2024 at AM    lisinopril (ZESTRIL) 40 MG tablet Take 1 tablet (40 mg) by mouth daily 3/1/2024 at AM    metoprolol tartrate (LOPRESSOR) 100 MG tablet TAKE 1 TABLET(100 MG) BY MOUTH TWICE DAILY 3/1/2024 at PM    simvastatin (ZOCOR) 40 MG tablet Take 1 tablet (40 mg) by mouth At Bedtime 3/1/2024 at PM

## 2024-03-02 NOTE — PROGRESS NOTES
Observation goals  PRIOR TO DISCHARGE        Comments: -diagnostic tests and consults completed and resulted-Not Met  -vital signs normal or at patient baseline-Not Met  -tolerating oral intake to maintain hydration-Not Met  -adequate pain control on oral analgesics-Not Met  -safe disposition plan has been identified-Not Met  Nurse to notify provider when observation goals have been met and patient is ready for discharge.      PRIMARY Concern: Fallw/increased confusion  SAFETY RISK Concerns (fall risk, behaviors, etc.):Fall    Isolation/Type: NA  Tests/Procedures for NEXT shift:  Echo  Consults? (Pending/following, signed-off?) WOC  Where is patient from? (Home, TCU, etc.): Home  Other Important info for NEXT shift: NA  Anticipated DC date & active delays: TBD  ____________________________________________  SUMMARY NOTE:  Orientation/Cognitive: A&Ox2-3.   Observation Goals (Met/ Not Met): not met  Mobility Level/Assist Equipment: A1-2 GB/W  Antibiotics & Plan (IV/po, length of tx left): None  Pain Management: Denies pain   Tele/VS/O2: AVSS on RA, Tele SR  ABNL Lab/BG:  See results  Diet:  reg  Bowel/Bladder:  incont, purewick  Skin Concerns: abrasions BUE  Drains/Devices: PIV infusing NS at 100 ml/hr  Patient Stated Goal for Today: to eat

## 2024-03-02 NOTE — H&P
[facility]    History and Physical - Hospitalist Service       Date of Admission:  3/2/2024    Assessment & Plan      Lori Fall is a 89 year old female admitted on 3/2/2024. She presented to the [facility] ER following a fall at home.  She was on the ground for at least 4-6 hours.  She does not believe she had syncope and thinks she tripped.     Fall (Suspect mechanical but patient doesn't entirely remember events)  Left arm skin tear with fall  Rhabdomyolysis:  -  IVF, trend CK  -  No fractures noted.  Therapy consult.  As gets more mobile post fall if new pain areas further evaluate.  -  Wound consult for skin tear  -  Trop flat, do not believe represents ACS.  Cardiac monitor, Echo - Doubt syncope but cannot entirely rule out with description/memory of event.    HTN:  -  Resume amlodipine, lisinopril, metoprolol.  Initially will use half dose metoprolol until see how BP trends with resuming all this.  Recheck BMP during stay while also trending CK.    History of gastric ulcer with GI bleed 2017:  -  No acute GI complaints     Hyperlipidemia:  -  Hold statin given rhabdomyolysis    Deconditioning:  -  Therapy eval         Medical Decision Making       55 MINUTES SPENT BY ME on the date of service doing chart review, history, exam, documentation & further activities per the note.        PPE Used:  Mask  Diet: Regular Diet Adult    DVT Prophylaxis: Pneumatic Compression Devices  Guzman Catheter: Not present  Lines: None     Cardiac Monitoring: ACTIVE order. Indication: AMI (NSTEMI/ STEMI) (48 hours)  Code Status:  NO CPR/NO Intubation confirmed with patient, family also present in room and in agreement.    Clinically Significant Risk Factors Present on Admission                  # Hypertension: Home medication list includes antihypertensive(s)                 Disposition Plan      Expected Discharge Date: 1-2 days, likely back to prior living situation               Dajuan Garcia,  DO  Hospitalist Service  [facility]  Securely message with [employer] (more info)  Text page via [employer] Paging/Directory   ____________________________________________________________________    Chief Complaint   Fall    History is obtained from the patient, also discussed with ED provider and family present.    History of Present Illness   Lori Fall is a 89 year old female who had a fall at home last night around bed time.  Patient doesn't entirely remember events but thinks she caught her toe or walker and fell backwards striking her back.  She had trouble getting up and it took her a few hours to get help.  She felt tired and maybe a little dizzy around event but doesn't think she passed out.  Denies chest pain, worsening SOB.  Currently no major pain in bed.  She has had a couple other falls the past 2 years but no other ones recently.      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    PUD (peptic ulcer disease) 1960s    Pure hypercholesterolemia     abstracted 7/5/02       Past Surgical History   Past Surgical History:   Procedure Laterality Date    ESOPHAGOSCOPY, GASTROSCOPY, DUODENOSCOPY (EGD), COMBINED N/A 5/7/2017    Procedure: COMBINED ESOPHAGOSCOPY, GASTROSCOPY, DUODENOSCOPY (EGD), BIOPSY SINGLE OR MULTIPLE;  EGD;  Surgeon: Guero Olmedo MD;  Location:  GI    TONSILLECTOMY & ADENOIDECTOMY  1956    RUST NONSPECIFIC PROCEDURE  1995    splenectomy -- abstracted 7/5/02    Z NONSPECIFIC PROCEDURE  1977    EDWIN  BSO appendectomy    RUST NONSPECIFIC PROCEDURE  1994    benign breast bopsy    RUST NONSPECIFIC PROCEDURE  6/28/2010    Left total hip arthroplasty, Bijal uncemented  components       Prior to Admission Medications   Prior to Admission Medications   Prescriptions Last Dose Informant Patient Reported? Taking?   Calcium Carbonate-Vitamin D (CALCIUM + D) 600-200 MG-UNIT per tablet 3/1/2024 at PM  Yes Yes   Sig: Take 2 tablets by mouth 2 times daily.   amLODIPine (NORVASC) 10 MG tablet 3/1/2024 at AM  No Yes   Sig: Take 1 tablet (10 mg) by mouth daily   ezetimibe (ZETIA) 10 MG tablet 3/1/2024 at PM  No Yes   Sig: Take 1 tablet (10 mg) by mouth At Bedtime   fenofibrate (TRIGLIDE/LOFIBRA) 160 MG tablet 3/1/2024 at AM  No Yes   Sig: Take 1 tablet (160 mg) by mouth daily   lisinopril (ZESTRIL) 40 MG tablet 3/1/2024 at AM  No Yes   Sig: Take 1 tablet (40 mg) by mouth daily   metoprolol tartrate (LOPRESSOR) 100 MG tablet 3/1/2024 at PM  No Yes   Sig: TAKE 1 TABLET(100 MG) BY MOUTH TWICE DAILY   simvastatin (ZOCOR) 40 MG tablet 3/1/2024 at PM  No Yes   Sig: Take 1 tablet (40 mg) by mouth At Bedtime      Facility-Administered Medications: None      Allergies   Allergies   Allergen Reactions    Aminoglycosides      neosporin onintment - rash    Hctz Hives    Penicillins     Sulfa Antibiotics           Physical Exam   Vital Signs: Temp: 98.1  F (36.7  C) Temp src: Oral BP: (!) 162/88 Pulse: 80   Resp: 20 SpO2: 96 % O2 Device: None (Room air)    Weight: 0 lbs 0 oz    GEN:  Alert, oriented, answers basic questions well, confused with some details.  Appears comfortable.  HEENT:  Normocephalic/atraumatic, no scleral icterus, no nasal discharge, mouth moist.  CV:  Regular rate and rhythm, no loud murmur/rub.  LUNGS:  Clear to auscultation bilaterally without rales/rhonchi/wheezing/retractions.  Symmetric chest rise on inhalation noted.  ABD:  Active bowel sounds, soft, non-tender/non-distended.  No rebound/guarding/rigidity.  EXT:  No edema.  No cyanosis.  Moving extremities on request.  SKIN:  Dry to touch, no exanthems noted in the visualized areas.  NEURO:  Symmetric muscle strength on general  exam, sensation to touch grossly intact.  No new focal deficits appreciated.    Data     I have personally reviewed the following data over the past 24 hrs:    8.7  \   13.3   / 241     139 104 16.8 /  104 (H)   3.6 22 0.57 \     ALT: 16 AST: 41 AP: 70 TBILI: 0.5   ALB: 3.7 TOT PROTEIN: 6.2 (L) LIPASE: N/A     Trop: 44 (H) BNP: N/A     Procal: N/A CRP: N/A Lactic Acid: 1.6         Recent Labs   Lab 03/02/24  0627   COLOR Light Yellow   APPEARANCE Clear   URINEGLC Negative   URINEBILI Negative   URINEKETONE Negative   SG 1.008   UBLD Negative   URINEPH 5.5   PROTEIN Negative   NITRITE Negative   LEUKEST Negative   RBCU 2   WBCU 1       Recent Results (from the past 24 hour(s))   Head CT w/o contrast    Narrative    EXAM: CT HEAD W/O CONTRAST  LOCATION: [facility]  DATE: 3/2/2024    INDICATION: ams fell from standing. Injury. Pain.  COMPARISON: None.  TECHNIQUE: Routine CT Head without IV contrast. Multiplanar reformats. Dose reduction techniques were used.    FINDINGS:  INTRACRANIAL CONTENTS: No intracranial hemorrhage, extraaxial collection, or mass effect.  No CT evidence of acute infarct. Mild presumed chronic small vessel ischemic changes. Moderate generalized volume loss. No hydrocephalus. Skull base vascular   calcifications. 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     VISUALIZED ORBITS/SINUSES/MASTOIDS: Prior bilateral cataract surgery. Visualized portions of the orbits are otherwise unremarkable. No paranasal sinus mucosal disease. No middle ear or mastoid effusion.    BONES/SOFT TISSUES: No acute abnormality.      Impression    IMPRESSION:  1.  No CT evidence for acute intracranial process.  2.  Brain atrophy and presumed chronic microvascular ischemic changes as above.   CT Cervical Spine w/o Contrast    Narrative    EXAM: CT CERVICAL SPINE W/O CONTRAST  LOCATION: [facility]  DATE: 3/2/2024    INDICATION: fall, ams. Injury.  Pain.  COMPARISON: None.  TECHNIQUE: Routine CT Cervical Spine without IV contrast. Multiplanar reformats. Dose reduction techniques were used.    FINDINGS:  Mid cervical dextroconvex curvature without congenital segmentation anomaly. Negative for fracture or traumatic malalignment. No extraspinal abnormality.     Craniovertebral junction and C1-C2: Normal.    C2-C3: Normal disc height. No herniation. Normal facets. No spinal canal or neural foraminal stenosis.     C3-C4: 2 mm degenerative anterolisthesis. Prominent left C4 nerve root sleeve which is nonspecific. Slight widening of the foramen. Nerve sheath tumor is a consideration. Tortuous left vertebral artery can sometimes produce a similar appearance. No   evidence for bony encroachment upon the neural foramina. Left facet DJD. Patent central canal. This could be confirmed with contrast-enhanced MRI as clinically needed.     C4-C5: 2 mm degenerative retrolisthesis. Uncinate spur with mild left foraminal stenosis. Patent central canal.     C5-C6: Uncinate spur with moderate left foraminal stenosis. Right foramen patent. Patent central canal.     C6-C7: Facet and uncinate degenerative changes with minimal left foraminal stenosis. Patent central canal.     C7-T1: Facet DJD with mild bilateral foraminal stenosis.       Impression    IMPRESSION:  1.  Negative for fracture or traumatic malalignment.    2.  Slight increased density and widening of the left C3-C4 foramen. Differential considerations as. Likely incidental in the setting of trauma.   XR Chest 2 Views    Narrative    EXAM: XR CHEST 2 VIEWS  LOCATION: [facility]  DATE: 03/02/2024    INDICATION: Fall.  COMPARISON: None.      Impression    IMPRESSION: No acute infiltrates. Moderate-sized hiatal hernia. No pneumothorax. No definite displaced rib fracture. Scoliosis.     XR Pelvis 1/2 Views    Narrative    EXAM: XR PELVIS 1/2 VIEWS  LOCATION: [facility]  DATE: 3/2/2024    INDICATION: Pain after fall  COMPARISON: None.      Impression    IMPRESSION: Postoperative changes bilateral total hip arthroplasty. Components appear well seated. Pelvis negative for fracture. Degenerative change lower lumbar spine with convex left lumbar curvature. Vascular calcifications. Diffuse demineralization.

## 2024-03-03 NOTE — CODE/RAPID RESPONSE
[facility]    RRT Note  3/3/2024   Time Called: 112pm    RRT called for: Dysarthria, not able to follow commands    HPI: Lori Fall is a 89 year old female w/ PMH of hypertension, hyperlipidemia, PUD with GI bleed, osteopenia who was registered to observation on 3/2/2024 for fall with acute rhabdomyolysis after 6-hour timeframe on the floor.    RRT activated when family noted difficulty understanding her speech.  She was evaluated by her rounding NEHAL noted to be dysarthric and not following commands. RRT thus activated.  On my arrival I find an elderly woman lying in bed tilted head back looking up at the ceiling, intermittently grabbing for things in the air she appears to be hallucinating.  Family at bedside witnessed some full body tremors, seconds in duration.  She was awake at the time but not talking.  Family and nursing staff not able to definitively identify when her last known well time was.  Noted onset of dysarthria around 10:30 AM.  Family notes she has been confused all morning.  Heart rate is 81, SpO2 91-94% on room air, respiratory rate 26, heart rate 77, axillary temp 99.1, rectal temp 99.2, /80.  She has very dry oral mucosa but is able to wiggle toes and stick out her tongue when asked.  She does have a very slight left facial droop.  Initially moistening her mouth did not significantly improve her dysarthria but was improved on subsequent attempts of oral  moisturizing    Assessment & Plan     Dysarthria, somewhat improved with mouth moisturizing  Subtle left facial droop  Self-limited brief spell of whole body shaking  Urinary retention  Suspected hallucinations  Differential diagnosis is broad ranging from possible infectious etiology/sepsis potentially from urinary source, acute ischemic stroke (risk factors include hypertension, hyperlipidemia), seizures, as well as hyperactive delirium secondary to environmental change, sleep deprivation, suspected  "pain/discomfort post fall with prolonged-multi hour downtime     INTERVENTIONS:  -stat gluc 80 mg/dL  -/80  -Stat CMP, CBC, LA--> exception of chloride, bicarb and AST (not surprisingly elevated with her rhabdo) all are largely within normal limits  -stat ua--> negative  -NIHSS by ICU RN  -rectal temp 99.2  -stat pcxr  -stat covid, flu, rsv pcr  -discussed w/ HCA who is at bedside.  Pt has been unhappy for \"a while.\"  HCA doesn't think pt would want to endure stroke imaging and would likely require medication in order to complete studies.  Also questionable if she'd be a candidate for thrombolytic therapy or potentially have risks of complication thereafter in light of recent fall.  HCA doesn't believe patient would want undergo stroke imaging, citing that her spouse had multiple complications after stroke  -HCA ok w/ infectious workup but wishes to defer neuroimaging at this time.  Per patient's advanced directive patient does not desire aggressive treatment.  This decision is consistent with patient's previously expressed wishes  -Will administer acetaminophen empirically for analgesia  - Discussed with family and RN staff provision of vigorous oral care and frequent reorientation    Last 24H PRN:     acetaminophen (TYLENOL) tablet 650 mg, 650 mg at 03/03/24 1415 **OR** acetaminophen (TYLENOL) Suppository 650 mg    melatonin tablet 1 mg, 1 mg at 03/02/24 1944      Working diagnosis:suspected delirium/metabolic encephalopathy 2/2 possible infectious process, new environment, possible pain 2/2 prolonged down time, poor sleep, dry mouth    At the end of the RRT speech somewhat improved w/ oral moisturizing    disposition continue current level of care    Discussed with and defer further cares to rounding hospitalist NEHAL De La Cruz    Code Status: No CPR- Do NOT Intubate    Physical Exam   Vital Signs with Ranges:  Temp:  [97.3  F (36.3  C)-99.7  F (37.6  C)] 97.9  F (36.6  C)  Pulse:  [] 88  Resp:  [15-18] " 16  BP: ()/(46-98) 98/56  SpO2:  [92 %-96 %] 92 %  I/O last 3 completed shifts:  In: 120 [P.O.:120]  Out: 1100 [Urine:1100]    Constitutional: vs as above and/or per EMR  General:  adult pt lying in bed mildly agitated   GCS:   Motor 6=Obeys commands   Verbal 4=Confused   Eye Opening 4=Spontaneous   Total: 14     Neuro: +follows commands wiggle toes, bilateral upper extremities are grossly weak both fall to bed, face symmetric with very subtle left facial droop, tongue midline, speech dysarthric but comprehensible  Head, ENT & mouth: NC/AT, dry oral mucosa  Neck: supple  CV S1S2 sinus rhythm no murmurs  resp: CTAB upper and lower lobes, but tachypneic  gi:normoactive bowel sounds, soft, nontender, nondisteded  Ext: no edema or mottling  Skin: no rashes on exposed skin  Musculoskeletal no bony joint deformities      Data     IMAGING: (X-ray/CT/MRI)   Recent Results (from the past 24 hour(s))   Echocardiogram Complete   Result Value    LVEF  65-70%    Narrative    [id number]  [id number]  [id number]  [id number]^IVELISSE^ELIA^LAURO     [facility]  Echocardiography Laboratory  [address] [place] [address]     Name: NEREIDA TENORIO  MRN: [id number]  : 1929  Study Date: 2024 09:08 AM  Age: 89 yrs  Gender: Female  Patient Location: [facility]  Reason For Study: Syncope and Collapse  Ordering Physician: ELIA OVIEDO  Referring Physician: ELIA OVIEDO  Performed By: Norma Mason     BSA: 1.6 m2  Height: 62 in  Weight: 124 lb  HR: 89  BP: 144/82 mmHg  ______________________________________________________________________________  Procedure  Complete Echo Adult. Optison (NDC #[id number]) given intravenously.  ______________________________________________________________________________  Interpretation Summary     The visual ejection fraction is 65-70%.  Left ventricular systolic function is normal.  There is trace aortic regurgitation.  The study was technically  difficult.  ______________________________________________________________________________  Left Ventricle  The left ventricle is normal in size. There is mild concentric left  ventricular hypertrophy. Diastolic Doppler findings (E/E' ratio and/or other  parameters) suggest left ventricular filling pressures are indeterminate.  Grade I or early diastolic dysfunction. The visual ejection fraction is 65-  70%. Left ventricular systolic function is normal.     Right Ventricle  The right ventricle is normal in size and function.     Atria  Normal left atrial size. Right atrial size is normal. There is no color  Doppler evidence of an atrial shunt.     Mitral Valve  The mitral valve leaflets are mildly thickened. There is trace mitral  regurgitation.     Tricuspid Valve  There is mild (1+) tricuspid regurgitation. The right ventricular systolic  pressure is approximated at 26.7 mmHg plus the right atrial pressure.     Aortic Valve  The aortic valve is trileaflet. There is trace aortic regurgitation. No  hemodynamically significant valvular aortic stenosis.     Pulmonic Valve  There is no pulmonic valvular regurgitation.     Vessels  The aortic root is normal size. Normal size ascending aorta. IVC diameter <2.1  cm collapsing >50% with sniff suggests a normal RA pressure of 3 mmHg.     Pericardium  There is no pericardial effusion.     Rhythm  Sinus rhythm was noted.  ______________________________________________________________________________  MMode/2D Measurements & Calculations  IVSd: 0.95 cm     LVIDd: 3.8 cm  LVIDs: 2.5 cm  LVPWd: 0.94 cm  FS: 33.6 %  LV mass(C)d: 108.6 grams  LV mass(C)dI: 69.6 grams/m2  Ao root diam: 3.4 cm  LA dimension: 2.9 cm  asc Aorta Diam: 3.2 cm  LA/Ao: 0.85  LVOT diam: 1.8 cm  LVOT area: 2.5 cm2  Ao root diam index Ht(cm/m): 2.2  Ao root diam index BSA (cm/m2): 2.2  Asc Ao diam index BSA (cm/m2): 2.1  Asc Ao diam index Ht(cm/m): 2.0  RWT: 0.50  TAPSE: 2.8 cm     Doppler Measurements &  Calculations  MV E max jeovany: 78.1 cm/sec  MV A max jeovany: 107.0 cm/sec  MV E/A: 0.73  MV dec time: 0.18 sec     Ao V2 max: 135.5 cm/sec  Ao max P.0 mmHg  Ao V2 mean: 92.7 cm/sec  Ao mean P.0 mmHg  Ao V2 VTI: 23.8 cm  DEVIN(I,D): 2.3 cm2  DEVIN(V,D): 2.3 cm2  LV V1 max P.9 mmHg  LV V1 max: 121.0 cm/sec  LV V1 VTI: 21.3 cm  SV(LVOT): 54.2 ml  SI(LVOT): 34.7 ml/m2  PA acc time: 0.13 sec  TR max jeovany: 258.2 cm/sec  TR max P.7 mmHg  AV Jeovany Ratio (DI): 0.89  DEVIN Index (cm2/m2): 1.5  E/E' av.3  Lateral E/e': 9.0  Medial E/e': 13.5  RV S Jeovany: 20.8 cm/sec     ______________________________________________________________________________  Report approved by: Toribio Smith 2024 01:16 PM             CBC with Diff:  Recent Labs   Lab Test 24  1321 17  1300 17  1309   WBC 7.9   < > 11.8*   HGB 12.0   < > 12.2   MCV 91   < > 92      < > 303   INR  --   --  1.02    < > = values in this interval not displayed.        Lactic Acid:    Lab Results   Component Value Date    LACT 1.2 2024    LACT 1.1 2017           Comprehensive Metabolic Panel:  Recent Labs   Lab 24  1321 24  0413 24  0609      < > 139   POTASSIUM 4.1   < > 3.6   CHLORIDE 108*   < > 104   CO2 18*   < > 22   ANIONGAP 11   < > 13   GLC 86   < > 104*   BUN 13.3   < > 16.8   CR 0.56   < > 0.57   GFRESTIMATED 84   < > 84   PRAKASH 8.5   < > 9.5   MAG  --   --  1.9   PROTTOTAL 5.6*  --  6.2*   ALBUMIN 3.3*  --  3.7   BILITOTAL 0.5  --  0.5   ALKPHOS 61  --  70   AST 66*  --  41   ALT 23  --  16    < > = values in this interval not displayed.     UA:  Recent Labs   Lab 24  0627   COLOR Light Yellow   APPEARANCE Clear   URINEGLC Negative   URINEBILI Negative   URINEKETONE Negative   SG 1.008   UBLD Negative   URINEPH 5.5   PROTEIN Negative   NITRITE Negative   LEUKEST Negative   RBCU 2   WBCU 1     Time Spent on this Encounter   I spent 40 minutes on the unit/floor managing the care of  Lori Fall. Over 50% of my time was spent counseling the patient and/or coordinating care regarding services listed in this note.    Medical Decision Making       40 MINUTES SPENT BY ME on the date of service doing chart review, history, exam, documentation & further activities per the note.        BRITTANI Merino [facility]  Hospitalist Service  [facility]  Securely message with [employer] (more info)  Text page via [facility] Paging/Directory

## 2024-03-03 NOTE — CONSULTS
"[facility]    Neurology Consultation     Date of Admission:  3/2/2024    Assessment & Plan   Lori Fall is a 89 year old female who was admitted on 3/2/2024. I was asked to see the patient for \"Worsening confusion and family reported full body tremor with subsequent worsening of her confusion\" .  The patient is encephalopathic, following fall questionable .  Episode of hypotension.  Significant proprioception decreased in lower extremities.  Recommend    -Continue metabolic and infectious workup of encephalopathy  -EEG to look for epileptiform discharges and for diffuse slowing  -Will check vitamin B12 folate, ammonia, etc for treat the bowel causes of unsteady gait and memory loss.  -Reevaluate the blood pressure medication to exclude the possibility of hypotension  -Minimize sedation and please do not give the patient neuroleptics  -Occupational Therapy speech pathology      Madiha Dao MD    Code Status    No CPR- Do NOT Intubate    Reason for Consult   Reason for consult: I was asked by POLINA Monroe to evaluate this patient for Worsening confusion and family reported full body tremor with subsequent worsening of her confusion .    Primary Care Physician   Alcides Guzman    Chief Complaint   confusion    History is obtained from the patient and electronic health record    History of Present Illness   Lori Fall is a 89 year old female who presents with confusion, status post fall.  The patient is not able to give me history.  Patient's nieces are present.  Apparently the patient lives independently in an assisted living.  She takes care of her own medication usually.  Patient niece was called prior to admission by the patient after she tripped on the way to the bathroom.  Apparently prior to this fall the patient memory was fine but confused after the fall.  Patient's niece thinks that the patient has been on the floor for a few hours prior " to the call.  Apparently the patient was not able to remember the niece's phone number which she does almost every day.    During the hospital the patient has had an episode of shakiness with both upper extremities extended.  The episode lasted for about 1 minute.    Patient is much more confused than usual.  She has had some mild memory loss and repetition and questions but not very significant memory loss per family members.    The patient seems to be hallucinating.    CK on admission were 687.  The patient was hypotensive this morning when the RRT was called.    CT of the head showed no acute intracranial process brain atrophy and small vessel ischemic disease, films were reviewed by me.    CT of the cervical spine shows no fracture or traumatic changes. C3-C4: 2 mm degenerative anterolisthesis. Prominent left C4 nerve root sleeve which is nonspecific. Slight widening of the foramen. Nerve sheath tumor is a consideration. Tortuous left vertebral artery can sometimes produce a similar appearance. No   evidence for bony encroachment upon the neural foramina. Left facet DJD. Patent central canal. This could be confirmed with contrast-enhanced MRI as clinically needed.     Past Medical History   I have reviewed this patient's medical history and updated it with pertinent information if needed.   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  Past Surgical History:   Procedure Laterality Date    ESOPHAGOSCOPY, GASTROSCOPY, DUODENOSCOPY  (EGD), COMBINED N/A 5/7/2017    Procedure: COMBINED ESOPHAGOSCOPY, GASTROSCOPY, DUODENOSCOPY (EGD), BIOPSY SINGLE OR MULTIPLE;  EGD;  Surgeon: Guero Olmedo MD;  Location:  GI    TONSILLECTOMY & ADENOIDECTOMY  1956    [facility] NONSPECIFIC PROCEDURE  1995    splenectomy -- abstracted 7/5/02    [facility] NONSPECIFIC PROCEDURE  1977    [facility]  BSO appendectomy    [facility] NONSPECIFIC PROCEDURE  1994    benign breast bopsy    [facility] NONSPECIFIC PROCEDURE  6/28/2010    Left total hip arthroplasty, Bijal uncemented components       Prior to Admission Medications   Prior to Admission Medications   Prescriptions Last Dose Informant Patient Reported? Taking?   Calcium Carbonate-Vitamin D (CALCIUM + D) 600-200 MG-UNIT per tablet 3/1/2024 at PM  Yes Yes   Sig: Take 2 tablets by mouth 2 times daily.   amLODIPine (NORVASC) 10 MG tablet 3/1/2024 at AM  No Yes   Sig: Take 1 tablet (10 mg) by mouth daily   ezetimibe (ZETIA) 10 MG tablet 3/1/2024 at PM  No Yes   Sig: Take 1 tablet (10 mg) by mouth At Bedtime   fenofibrate (TRIGLIDE/LOFIBRA) 160 MG tablet 3/1/2024 at AM  No Yes   Sig: Take 1 tablet (160 mg) by mouth daily   lisinopril (ZESTRIL) 40 MG tablet 3/1/2024 at AM  No Yes   Sig: Take 1 tablet (40 mg) by mouth daily   metoprolol tartrate (LOPRESSOR) 100 MG tablet 3/1/2024 at PM  No Yes   Sig: TAKE 1 TABLET(100 MG) BY MOUTH TWICE DAILY   simvastatin (ZOCOR) 40 MG tablet 3/1/2024 at PM  No Yes   Sig: Take 1 tablet (40 mg) by mouth At Bedtime      Facility-Administered Medications: None     Allergies   Allergies   Allergen Reactions    Aminoglycosides      neosporin onintment - rash    Hctz Hives    Penicillins     Sulfa Antibiotics        Social History   I have reviewed this patient's social history and updated it with pertinent information if needed. Lori Fall  reports that she has never smoked. She has never used smokeless tobacco. She reports that she does not drink alcohol and does not use drugs.    Family History   I have  reviewed this patient's family history and updated it with pertinent information if needed.   Family History   Problem Relation Age of Onset    Heart Disease Mother         d:age 66    Heart Disease Father         d:age 62    Family History Negative Brother         d:age 35  in war    Cancer Brother         d:age 53 cancer esophagus, alcoholic    Heart Disease Brother         b:1933 bypass surgery       Review of Systems   The 10 point Review of Systems is negative other than noted in the HPI or here.     Physical Exam   Temp: 97.9  F (36.6  C) Temp src: Oral BP: 138/78 Pulse: 77   Resp: 18 SpO2: 93 % O2 Device: None (Room air)    Vital Signs with Ranges  Temp:  [97.3  F (36.3  C)-99.7  F (37.6  C)] 97.9  F (36.6  C)  Pulse:  [] 77  Resp:  [15-18] 18  BP: ()/(46-98) 138/78  SpO2:  [92 %-96 %] 93 %  124 lbs 1.9 oz    Constitutional:-cachectic looking   eyes: No conjunctival erythema  ENT: Neck is supple  Respiratory: CTA  Cardiovascular: RRR  Skin: Redness in lower extremities and some small lacerations over the feet.  Musculoskeletal: No pedal edema, there is muscle atrophy in both upper and lower extremities generalized.  The patient is alert oriented to person place month but not to the year.  She knows the name of the president.  Speech is fluent there is no aphasia, however speech is slurred.  The patient talks of things that are not there.  She does not follow with Mini-Mental status exam.  Pupils are equal round reactive to light extraocular movements are intact, there is no nystagmus.  Facial muscles are equal bilaterally.  Uvula and tongue are midline.  Muscular mass tone and strength are normal in all 4 extremities there is no pronator drift.  Reflexes are trace in upper extremities, I could not get any reflexes in lower extremities.  Sensory exam is normal to light touch.  Vibratory sense was decreased to the knees.  Finger-nose-finger without dysmetria.  Fine movements are clumsy  bilateral  Gait was deferred  Neuropsychiatric: Unable to assess     Data   Results for orders placed or performed during the hospital encounter of 24 (from the past 24 hour(s))   CK total   Result Value Ref Range     (H) 26 - 192 U/L   Glucose by meter   Result Value Ref Range    GLUCOSE BY METER POCT 146 (H) 70 - 99 mg/dL   Basic metabolic panel   Result Value Ref Range    Sodium 139 135 - 145 mmol/L    Potassium 3.7 3.4 - 5.3 mmol/L    Chloride 103 98 - 107 mmol/L    Carbon Dioxide (CO2) 20 (L) 22 - 29 mmol/L    Anion Gap 16 (H) 7 - 15 mmol/L    Urea Nitrogen 14.3 8.0 - 23.0 mg/dL    Creatinine 0.63 0.51 - 0.95 mg/dL    GFR Estimate 82 >60 mL/min/1.73m2    Calcium 9.0 8.2 - 9.6 mg/dL    Glucose 122 (H) 70 - 99 mg/dL   CK total   Result Value Ref Range     (H) 26 - 192 U/L   CBC with platelets   Result Value Ref Range    WBC Count 7.8 4.0 - 11.0 10e3/uL    RBC Count 4.15 3.80 - 5.20 10e6/uL    Hemoglobin 12.5 11.7 - 15.7 g/dL    Hematocrit 37.7 35.0 - 47.0 %    MCV 91 78 - 100 fL    MCH 30.1 26.5 - 33.0 pg    MCHC 33.2 31.5 - 36.5 g/dL    RDW 15.1 (H) 10.0 - 15.0 %    Platelet Count 230 150 - 450 10e3/uL   Lactic acid whole blood   Result Value Ref Range    Lactic Acid 4.2 (HH) 0.7 - 2.0 mmol/L   Lactic acid whole blood   Result Value Ref Range    Lactic Acid 2.1 (H) 0.7 - 2.0 mmol/L   Echocardiogram Complete   Result Value Ref Range    LVEF  65-70%     Narrative    [id number]  [id number]  [id number]^IVELISSE^ELIA^LAURO     [facility]  Echocardiography Laboratory  [address] [place] [id number]     Name: NEREIDA TENORIO  MRN: [id number]  : 1929  Study Date: 2024 09:08 AM  Age: 89 yrs  Gender: Female  Patient Location: [facility]  Reason For Study: Syncope and Collapse  Ordering Physician: ELIA OVIEDO  Referring Physician: ELIA OVIEDO  Performed By: Norma Mason     BSA: 1.6 m2  Height: 62 in  Weight: 124 lb  HR: 89  BP: 144/82  mmHg  ______________________________________________________________________________  Procedure  Complete Echo Adult. Optison (NDC #[id number]) given intravenously.  ______________________________________________________________________________  Interpretation Summary     The visual ejection fraction is 65-70%.  Left ventricular systolic function is normal.  There is trace aortic regurgitation.  The study was technically difficult.  ______________________________________________________________________________  Left Ventricle  The left ventricle is normal in size. There is mild concentric left  ventricular hypertrophy. Diastolic Doppler findings (E/E' ratio and/or other  parameters) suggest left ventricular filling pressures are indeterminate.  Grade I or early diastolic dysfunction. The visual ejection fraction is 65-  70%. Left ventricular systolic function is normal.     Right Ventricle  The right ventricle is normal in size and function.     Atria  Normal left atrial size. Right atrial size is normal. There is no color  Doppler evidence of an atrial shunt.     Mitral Valve  The mitral valve leaflets are mildly thickened. There is trace mitral  regurgitation.     Tricuspid Valve  There is mild (1+) tricuspid regurgitation. The right ventricular systolic  pressure is approximated at 26.7 mmHg plus the right atrial pressure.     Aortic Valve  The aortic valve is trileaflet. There is trace aortic regurgitation. No  hemodynamically significant valvular aortic stenosis.     Pulmonic Valve  There is no pulmonic valvular regurgitation.     Vessels  The aortic root is normal size. Normal size ascending aorta. IVC diameter <2.1  cm collapsing >50% with sniff suggests a normal RA pressure of 3 mmHg.     Pericardium  There is no pericardial effusion.     Rhythm  Sinus rhythm was noted.  ______________________________________________________________________________  MMode/2D Measurements & Calculations  IVSd: 0.95 cm      LVIDd: 3.8 cm  LVIDs: 2.5 cm  LVPWd: 0.94 cm  FS: 33.6 %  LV mass(C)d: 108.6 grams  LV mass(C)dI: 69.6 grams/m2  Ao root diam: 3.4 cm  LA dimension: 2.9 cm  asc Aorta Diam: 3.2 cm  LA/Ao: 0.85  LVOT diam: 1.8 cm  LVOT area: 2.5 cm2  Ao root diam index Ht(cm/m): 2.2  Ao root diam index BSA (cm/m2): 2.2  Asc Ao diam index BSA (cm/m2): 2.1  Asc Ao diam index Ht(cm/m): 2.0  RWT: 0.50  TAPSE: 2.8 cm     Doppler Measurements & Calculations  MV E max jeovany: 78.1 cm/sec  MV A max jeovany: 107.0 cm/sec  MV E/A: 0.73  MV dec time: 0.18 sec     Ao V2 max: 135.5 cm/sec  Ao max P.0 mmHg  Ao V2 mean: 92.7 cm/sec  Ao mean P.0 mmHg  Ao V2 VTI: 23.8 cm  DEVIN(I,D): 2.3 cm2  DEVIN(V,D): 2.3 cm2  LV V1 max P.9 mmHg  LV V1 max: 121.0 cm/sec  LV V1 VTI: 21.3 cm  SV(LVOT): 54.2 ml  SI(LVOT): 34.7 ml/m2  PA acc time: 0.13 sec  TR max jeovany: 258.2 cm/sec  TR max P.7 mmHg  AV Jeovany Ratio (DI): 0.89  DEVIN Index (cm2/m2): 1.5  E/E' av.3  Lateral E/e': 9.0  Medial E/e': 13.5  RV S Jeovany: 20.8 cm/sec     ______________________________________________________________________________  Report approved by: Toribio Smith 2024 01:16 PM         Glucose by meter   Result Value Ref Range    GLUCOSE BY METER POCT 129 (H) 70 - 99 mg/dL   Lactic acid whole blood   Result Value Ref Range    Lactic Acid 1.2 0.7 - 2.0 mmol/L   Glucose by meter   Result Value Ref Range    GLUCOSE BY METER POCT 80 70 - 99 mg/dL   CBC with Platelets & Differential    Narrative    The following orders were created for panel order CBC with Platelets & Differential.  Procedure                               Abnormality         Status                     ---------                               -----------         ------                     CBC with platelets and d...[[id number]]  Abnormal            Final result               Manual Differential[[id number]]                              Final result                 Please view results for these tests on the  individual orders.   Comprehensive metabolic panel   Result Value Ref Range    Sodium 137 135 - 145 mmol/L    Potassium 4.1 3.4 - 5.3 mmol/L    Carbon Dioxide (CO2) 18 (L) 22 - 29 mmol/L    Anion Gap 11 7 - 15 mmol/L    Urea Nitrogen 13.3 8.0 - 23.0 mg/dL    Creatinine 0.56 0.51 - 0.95 mg/dL    GFR Estimate 84 >60 mL/min/1.73m2    Calcium 8.5 8.2 - 9.6 mg/dL    Chloride 108 (H) 98 - 107 mmol/L    Glucose 86 70 - 99 mg/dL    Alkaline Phosphatase 61 40 - 150 U/L    AST 66 (H) 0 - 45 U/L    ALT 23 0 - 50 U/L    Protein Total 5.6 (L) 6.4 - 8.3 g/dL    Albumin 3.3 (L) 3.5 - 5.2 g/dL    Bilirubin Total 0.5 <=1.2 mg/dL   Extra Tube ([place] Draw)    Narrative    The following orders were created for panel order Extra Tube ([place] Draw).  Procedure                               Abnormality         Status                     ---------                               -----------         ------                     Extra Blue Top Tube[[id number]]                              Final result               Extra Red Top Tube[[id number]]                                                          Extra Green Top (Lithium...[[id number]]                                                 Extra Purple Top Tube[[id number]]                                                         Please view results for these tests on the individual orders.   CBC with platelets and differential   Result Value Ref Range    WBC Count 7.9 4.0 - 11.0 10e3/uL    RBC Count 3.99 3.80 - 5.20 10e6/uL    Hemoglobin 12.0 11.7 - 15.7 g/dL    Hematocrit 36.4 35.0 - 47.0 %    MCV 91 78 - 100 fL    MCH 30.1 26.5 - 33.0 pg    MCHC 33.0 31.5 - 36.5 g/dL    RDW 15.1 (H) 10.0 - 15.0 %    Platelet Count 222 150 - 450 10e3/uL    % Neutrophils      % Lymphocytes      % Monocytes      % Eosinophils      % Basophils      % Immature Granulocytes      NRBCs per 100 WBC 0 <1 /100    Absolute Neutrophils      Absolute Lymphocytes      Absolute Monocytes      Absolute Eosinophils      Absolute  Basophils      Absolute Immature Granulocytes      Absolute NRBCs 0.0 10e3/uL   Extra Blue Top Tube   Result Value Ref Range    Hold Specimen JI    Manual Differential   Result Value Ref Range    % Neutrophils 81 %    % Lymphocytes 12 %    % Monocytes 7 %    % Eosinophils 0 %    % Basophils 0 %    Absolute Neutrophils 6.4 1.6 - 8.3 10e3/uL    Absolute Lymphocytes 0.9 0.8 - 5.3 10e3/uL    Absolute Monocytes 0.6 0.0 - 1.3 10e3/uL    Absolute Eosinophils 0.0 0.0 - 0.7 10e3/uL    Absolute Basophils 0.0 0.0 - 0.2 10e3/uL    RBC Morphology Confirmed RBC Indices     Platelet Assessment  Automated Count Confirmed. Platelet morphology is normal.     Automated Count Confirmed. Platelet morphology is normal.   UA with Microscopic reflex to Culture    Specimen: Urine, Catheter   Result Value Ref Range    Color Urine Light Yellow Colorless, Straw, Light Yellow, Yellow    Appearance Urine Clear Clear    Glucose Urine Negative Negative mg/dL    Bilirubin Urine Negative Negative    Ketones Urine Negative Negative mg/dL    Specific Gravity Urine 1.011 1.003 - 1.035    Blood Urine Trace (A) Negative    pH Urine 5.5 5.0 - 7.0    Protein Albumin Urine Negative Negative mg/dL    Urobilinogen Urine Normal Normal, 2.0 mg/dL    Nitrite Urine Negative Negative    Leukocyte Esterase Urine Negative Negative    Mucus Urine Present (A) None Seen /LPF    RBC Urine 1 <=2 /HPF    WBC Urine 2 <=5 /HPF    Squamous Epithelials Urine <1 <=1 /HPF    Hyaline Casts Urine 1 <=2 /LPF    Narrative    Urine Culture not indicated

## 2024-03-03 NOTE — PROVIDER NOTIFICATION
Provider Notification    Notified Person: House NP    Notified Person Name: Marta Barrera    Notification Date/Time: 0501 3/3/24    Notification Interaction: in person    Purpose of Notification: critical lactic acid: 4.1    Orders Received: Plan to finish current bolus and then start another 500mL bolus over 30 min (see EMAR). Continue to monitor vitals closely    Comments: This lab was obtained during an RRT. Pt vitals improving.

## 2024-03-03 NOTE — PROGRESS NOTES
[facility]  Hospitalist Progress Note    Assessment & Plan   Lori Fall is a 89 year old female who was admitted on 3/2/2024.     Past medical history significant for HTN, HLP, Osteopenia, History of PUD with GI bleed who was registered to short-stay/observation due to fall resulting in acute rhabdomyolysis.      Patient presented to the [facility] ED following a fall at home.  Patient could not provide much detail regarding the fall but believe it was mechanical in nature as she thinks she tripped.  She was unable to get up off the ground and remained on the ground for at least 4-6 hours.      Work-up in the ED included a CMP that revealed a total protein of 6.2 and glucose of 104 otherwise within normal limits.  CBC with diff was unremarkable.  UA had mucus present otherwise was negative.    POCT lactic acid level was within normal limits at 1.6.  POCT VBG with O2 sat of 69 otherwise within normal limits.  Troponin T elevated at 47.  Total CK elevated at 687.      Imaging studies completed in the ED included a head CT w/o contrast, cervical spine CT w/o contrast 2 view chest Xray and 1/2 views pelvic  Xray that were negative for acute findings.  These did reveal moderate-sized hiatal hernia, slightly increased density and widening of the left C3-C4 foramen (incidental), post-op bilateral VAISHALI.        Suspected Hospital acquired delirium  Hallucinations  *Patient's niece was present in the room when assessed 3/3.  It was reported patient resided independently and has some slight memory issues (began ~ 6 months ago) but currently is far from her baseline.    *Discussion with House LYDIA and nursing staff 3/3; patient appears to be hallucinating and responding to internal stimuli.    *3/3; patient believes she is at [employer].  Was very slow to answer questions regarding her name but was able to answer appropriately.  She knew it was March 2024 and that Karyn is the current president.  She was  unable to state who her niece was.    -  Delirium prevention:   --Reorient patient at each interaction.  --Give patient window bed if possible.  --Keep room lights on and blinds open during day (8am-8pm), low lights 8pm-8am.  --Minimize interruptions of sleep at night (consolidate vitals, nursing assessments, medications).  -  PRN PO Seroquel available.    Fall (Suspect mechanical but patient doesn't entirely remember events)  Left arm skin tear with fall  Rhabdomyolysis  -  IV fluids at 100 ml/hr.    -  Trend total CK.   --687-->566-->687.    -  PT consult requested.    -  SW/CM consult requested.    -  Wound consult for skin tear (not available over the weekends).     Troponin elevation  -  Troponin trend:   --47-->50-->44.    -  ECHO in process.    -  Monitor on telemetry.      Acute hypotension (Resolved)  Elevated lactic acid level  Known HTN  *RRT called early morning 3/3 due to AMS and hypotension.  Briefly discussed with House LYDIA, Marta Barrera CNP.  Please see RRT note for full details.     **Noted elevated lactic acid level of 4.2 and BP of 87/46.     **Received IV fluid bolus 1000 ml NS.    -  Lactic acid level monitor:   --1.6-->4.2-->2.1  -  Hold PTA amlodipine 10 mg/d, lisinopril 40 mg/d.  -  Resumed on PTA metoprolol 100 mg BID with hold parameters.    -  Continue with IV fluids.    -  ECHO ordered as above.      History of gastric ulcer with GI bleed 2017  -  No acute GI complaints.  No interventions.      Hyperlipidemia  -  Hold PTA simvastatin 40 mg at bedtime given rhabdomyolysis.      Physical deconditioning  -  PT consult requested.       Clinically Significant Risk Factors Present on Admission                  # Hypertension: Home medication list includes antihypertensive(s)                   Diet: Regular Diet Adult     DVT Prophylaxis: Pneumatic Compression Devices   Guzman Catheter: Not present  Lines: None     Cardiac Monitoring: ACTIVE order. Indication: Syncope- high cardiac risk (48  hours)  Code Status: No CPR- Do NOT Intubate      Observation Goals: -diagnostic tests and consults completed and resulted, -vital signs normal or at patient baseline, -tolerating oral intake to maintain hydration, -adequate pain control on oral analgesics, -safe disposition plan has been identified, Nurse to notify provider when observation goals have been met and patient is ready for discharge.     Disposition Plan    Unclear at this time.  RRT called this morning due to altered mental status with hallucinations and patient found to have elevated lactic acid level and hypotension.  Patient continues to be confused (far from baseline per family) and concern for hospital acquired delirium.      Requested UR assessment for possible change to inpatient status.      The patient's care was discussed with the Care Coordinator/, Patient, Patient's Family, and Charge Nurse .      The patient has been discussed with Dr. Leach, who agrees with the assessment and plan at this time.    Pk De La Cruz PA-C  [facility]  Securely message with the Voc[employer] Web Console (learn more here)  Text page via [employer] Paging/Directory      Interval History   Patient was eating breakfast upon arrival.  Patient's niece is present in the room and indicated that she is not the power of  but her brother is.  She is a health care agent for majority of the health decisions would need to be directed to her brother if patient is unable to make her own decisions.    Reviewed events that occurred this morning with the patient's niece and discussed concerns for delirium.  She did mention that patient is far from her cognitive baseline and appears more confused today than she was yesterday.  She also mentioned that she has noticed that since being in the ED the patient has been responding to internal stimuli such as picking at things in the air that no one else can see and several other things.       Patient offers no complaints when asked.  And attempting to gauge her orientation patient had difficult time answering what type of building we are currently in and eventually stated it was a [employer].  She seemed confused when asked what her name was but eventually was able to respond correctly though very slowly.  She correctly answered what her birth date is as well as the correct month and year.  Patient knew that Karyn is our current president.    However, patient could not identify her niece who is sitting at her bedside.      -Data reviewed today: I reviewed all new labs and imaging results over the last 24 hours. I personally reviewed no images or EKG's today.    Physical Exam   /68 (BP Location: Left arm)   Pulse 99   Temp 97.9  F (36.6  C) (Oral)   Resp 18   Wt 56.3 kg (124 lb 1.9 oz)   LMP  (LMP Unknown)   SpO2 93%   BMI 22.70 kg/m      Constitutional: Awake, alert, cooperative, no apparent distress.    ENT: Normocephalic, without obvious abnormality, atraumatic, oral pharynx with moist mucus membranes, tonsils without erythema or exudates.  Neck: Supple, symmetrical, trachea midline, no adenopathy.  Pulmonary: No increased work of breathing, fair air exchange, clear to auscultation bilaterally, no crackles or wheezing.  Cardiovascular: Regular rate and rhythm, normal S1 and S2, no S3 or S4, and no murmur noted.  Skin/Integumen: Bandages present on left upper extremity otherwise visualized skin appeared clear.  Neuro: CN II-XII grossly intact.  Psych:  Alert and oriented x 2 but appeared confused and confirmed by patient's niece. Normal affect.  Extremities: No lower extremity edema noted, and calves are non-TTP bilaterally.       Medical Decision Making       Please see A&P for additional details of medical decision making.  Greater than 50 MINUTES SPENT BY ME on the date of service doing chart review, history, exam, documentation & further activities per the note.         Medications     sodium chloride 100 mL/hr at 03/03/24 0032      [Held by provider] amLODIPine  10 mg Oral Daily    [Held by provider] lisinopril  40 mg Oral Daily    metoprolol tartrate  50 mg Oral BID    sodium chloride 0.9%  500 mL Intravenous Once       Data   Recent Labs   Lab 03/03/24  0435 03/03/24  0413 03/02/24  0610 03/02/24  0609   WBC 7.8  --  8.7  --    HGB 12.5  --  13.3  --    MCV 91  --  91  --      --  241  --      --   --  139   POTASSIUM 3.7  --   --  3.6   CHLORIDE 103  --   --  104   CO2 20*  --   --  22   BUN 14.3  --   --  16.8   CR 0.63  --   --  0.57   ANIONGAP 16*  --   --  13   PRAKASH 9.0  --   --  9.5   * 146*  --  104*   ALBUMIN  --   --   --  3.7   PROTTOTAL  --   --   --  6.2*   BILITOTAL  --   --   --  0.5   ALKPHOS  --   --   --  70   ALT  --   --   --  16   AST  --   --   --  41       No results found for this or any previous visit (from the past 24 hour(s)).

## 2024-03-03 NOTE — UTILIZATION REVIEW
Admission Status; Secondary Review Determination       Under the authority of the Utilization Management Committee, the utilization review process indicated a secondary review on the above patient. The review outcome is based on review of the medical records, discussions with staff, and applying clinical experience noted on the date of the review.     (x) Inpatient Status Appropriate - This patient's medical care is consistent with medical management for inpatient care and reasonable inpatient medical practice.     RATIONALE FOR DETERMINATION   89-year-old female admitted on 3/2/2024 following a fall at home, leading to acute rhabdomyolysis, presented with suspected hospital-acquired delirium and hallucinations, prompting delirium prevention measures and consideration for PRN Seroquel. She had an elevated troponin T at 47, subsequently trending down to 44, with an ongoing ECHO evaluation and telemetry monitoring. Additionally, she experienced acute hypotension and elevated lactic acid levels, managed with IV fluid boluses and adjustments to antihypertensive medications. Work-up revealed a moderate-sized hiatal hernia and incidental findings on imaging, with PT, SW/CM, and wound consults requested for comprehensive management. Fall prevention strategies were implemented, and wound care was deferred to weekdays.  CRISS De La Cruz notified   The expected length of stay at the time of admission was more than 2 nights because of the severity of illness, intensity of service provided, and risk for adverse outcome. Inpatient admission is appropriate.         This document was produced using voice recognition software       The information on this document is developed by the utilization review team in order for the business office to ensure compliance. This only denotes the appropriateness of proper admission status and does not reflect the quality of care rendered.   The definitions of Inpatient Status and Observation Status  used in making the determination above are those provided in the CMS Coverage Manual, Chapter 1 and Chapter 6, section 70.4.   Sincerely,   KARON MORTON MD   System Medical Director   Utilization Management   [facility].

## 2024-03-03 NOTE — ACP (ADVANCE CARE PLANNING)
Writer was asked by [facility] Hospitalist to assist with getting patient's Health Care Directive Documentation from patient's Niece Christina who is currently in patient's room. Patient is currently having an RRT and documents need to be reviewed ASAP by provider. Writer was able to obtain patient's Health Care Directive by having patient's Niece Christina send the Document from her phone to writer's email. Writer then copied form for [employer] to review. The HCD had been signed and Notarized by patient. The documents have been e-mailed to [employer] to be authorized and scanned to patient's chart.

## 2024-03-03 NOTE — PROGRESS NOTES
Observation goals  PRIOR TO DISCHARGE        Comments: -diagnostic tests and consults completed and resulted-Not Met  -vital signs normal or at patient baseline-Met  -tolerating oral intake to maintain hydration-Not Met  -adequate pain control on oral analgesics-Not Met  -safe disposition plan has been identified-Not Met  Nurse to notify provider when observation goals have been met and patient is ready for discharge.

## 2024-03-03 NOTE — PLAN OF CARE
"[id number]: RRT this AM 0410, see house NP note. Remains confused and speech illogical at times. Able to follow simple commands and answer simple questions appropriately. Previous to RRT pt reaching out and asking for \"one of those furry balls on the ceiling.\" And then shortly after clinging to siderails shouting that she was afraid she was going to fall and needed to lay down (pt was already laying in bed). Now laying calmly in bed. Not impulsive or trying to get out of bed. Able to help roll in be for cares.    PRIMARY Concern: Fall w/ increased confusion  SAFETY RISK Concerns (fall risk, behaviors, etc.): Fall risk      Isolation/Type: N/A  Tests/Procedures for NEXT shift: orthostatic vitals. Echo. labs.  Consults? PT and WOC    Where is patient from? (Home, TCU, etc.): Home  Other Important info for NEXT shift: Christina Mosher, is point of contact  Anticipated DC date & active delays: pending clinical progress.  _____________________________________________________________________________  SUMMARY NOTE:  Orientation/Cognitive: Oriented to self, month, and year.   Observation Goals (Met/ Not Met): NOT MET  Mobility Level/Assist Equipment: heavy Ax2 GB/W   Antibiotics & Plan (IV/po, length of tx left): N/A  Pain Management: Denies  Tele/VS/O2: Tele: SR. VSS on RA.   ABNL Lab/BG: Trops flat. CK: 689. Lactic 4.1 during RRT, improved to 2.1 this AM, continue to monitor.    Diet: Regular  Bowel/Bladder: No BM overnight. Adequate output. Purewick in place.   Skin Concerns: Several pea-sized skin tears L elbow, L forearm, and R elbow. Wounds cleansed and dressed w/ vaseline gauze and mepilex.   Drains/Devices: Purewick in place.   Patient Stated Goal for Today: Wants to see her nieceChristina        "

## 2024-03-03 NOTE — PLAN OF CARE
Goal Outcome Evaluation:    Summary:  Fall with increased confusion    Date/time: 03/02/24 [id number]  Care Plan Summary Note:  Orientation: A&O x 2-3, confused   Observation Goals (met & not met): Not Met  Activity Level: A2 GBW but pt is very weak  Fall Risk: Yes  Behavior & Aggression Tool Color: Green  Pain Management: Denies  ABNL VS/O2: VS on RA  ABNL Lab/BG: CK total 566, Troponin T 44  Tele: NSR  Diet: Regular  Bowel/Bladder: Incontinent, pure wick in place   Drains/Devices: R PIV infusing NS at 100 ml/hr  Tests/Procedures for next shift: None  Anticipated DC date: TBD  Other Important Info:        Observation goals  PRIOR TO DISCHARGE       Comments: -diagnostic tests and consults completed and resulted Not met  -vital signs normal or at patient baseline  No Met  -tolerating oral intake to maintain hydration Not Met  -adequate pain control on oral analgesics  Met, denies pain  -safe disposition plan has been identified Not Met  Nurse to notify provider when observation goals have been met and patient is ready for discharge.

## 2024-03-03 NOTE — PROGRESS NOTES
House LYDIA brief RRT follow up:    I was asked to follow up  post RRT repeat LA.  It is is improving post volume resuscitation.      Recent Labs   Lab 03/03/24  0639 03/03/24  0435 03/02/24  0610   LACT 2.1* 4.2* 1.6     No charge    BRITTANI Merino [facility]  Hospitalist Service  [facility]  Securely message with [employer] (more info)  Text page via [facility] Paging/Directory

## 2024-03-03 NOTE — CODE/RAPID RESPONSE
"[facility]    House LYDIA RRT Note  3/3/2024   Time Called: 04:10 am     RRT called for: AMS    Lori Santamaria a 89 year old female w/ PMH of hypertension, HLD, osteopenia, primary thrombocytopenia s/p splenectomy, PUD who was admitted on 3/2/2024 for rhabdomyolysis following a fall at home.    Assessment & Plan   Patient last evaluated normal 0315 a.m. assessment.  Around 4 AM patient began vigorously gripping the bed rails and crying out that she was going to fall and that she just wants to lay down despite the fact she is lying supine prompting RRT evaluation of mental status.    Hypotension likely secondary to hypovolemia   AMS in the setting of hypotension vs toxic metabolic vs hospital acquired delirium   Visual hallucinations  Elevated lactic acid secondary to enhanced metabolic state     On my arrival I find an elderly woman lying supine grasping onto bilateral bed rails and acute panic and anxiety.  She repeatedly states she feels like she is falling and that she just \"needs to sit down\".  While vigorously gripping the bed rails with a flexed arm /77.  Heart rate 120 and sinus.  SpO2 95% on room air.  On exam she has dry mucous membranes to the point where it is difficult for her to articulate her speech.  No focal neuro exam deficits pupils are PERRL.  Skin is warm and dry.  No recent narcotics.  With reassurance and initiation of IV fluid bolus patient relaxed and BP 87/46 with a heart rate of 103 further indicating patient is likely hypovolemic.    Noted she had a fall prior to admission with negative CT head and negative CT spine for acute pathology.  Also noted infectious workup essentially negative thus far including urinalysis and chest x-ray just completed yesterday on 3/2/2024.    BP (!) 87/46 (BP Location: Left arm)   Pulse 103   Temp 97.6  F (36.4  C) (Axillary)   Resp 16   Wt 56.3 kg (124 lb 1.9 oz)   LMP  (LMP Unknown)   SpO2 95%   BMI 22.70 kg/m  "     INTERVENTIONS:  -  -500 mL NS IV bolus over 30 min, repeated x1 for a total of 1 L NS  -Previously ordered bmp and CK changed to stat as well as CBC and lactic acid   -Repeat lactic acid at 06:30 am following 1 L of IV and is more relaxed  -She does not appear toxic with no fevers or leukocytosis.  Will defer antibiotics and further infectious workup pending IV fluid resuscitation  -q shift I&O  -Orthostatic VS q 4 hrs   -Continuous pulse oximetry  -Continue previously ordered telemetry  -Hold amlodipine 10 mg and lisinopril 40 mg, continue metoprolol with hold parameters    -Echo ordered for later today 3/3/24  -No family was updated as I was called to another in-house emergency.    Working diagnosis: hypovolemia secondary to presumed reduced oral intake    At the end of the RRT patient is calm and restful in the bed. She states her dizziness has improved. Hemodynamically stable. More oriented but remains intermittently confused.     Disposition short stay unit     Defer further cares to hospitalist attending physician.  Repeat lactic acid will be signed out to oncoming colleague.    Code Status: No CPR- Do NOT Intubate    Allergies   Allergies   Allergen Reactions    Aminoglycosides      neosporin onintment - rash    Hctz Hives    Penicillins     Sulfa Antibiotics        Physical Exam   Vital Signs with Ranges:  Temp:  [97.3  F (36.3  C)-99.7  F (37.6  C)] 97.6  F (36.4  C)  Pulse:  [] 112  Resp:  [7-19] 16  BP: (103-172)/(63-88) 103/77  SpO2:  [93 %-97 %] 95 %  I/O last 3 completed shifts:  In: 120 [P.O.:120]  Out: 700 [Urine:700]    Physical Exam  Constitutional:       Appearance: She is not ill-appearing or diaphoretic.   HENT:      Head: Normocephalic and atraumatic.      Mouth/Throat:      Mouth: Mucous membranes are dry.   Eyes:      Extraocular Movements: Extraocular movements intact.      Pupils: Pupils are equal, round, and reactive to light.      Comments: Eye glasses on     Cardiovascular:      Rate and Rhythm: Regular rhythm. Tachycardia present.      Pulses: Normal pulses.      Heart sounds: Normal heart sounds. No murmur heard.  Pulmonary:      Effort: Pulmonary effort is normal.      Breath sounds: Normal breath sounds.   Abdominal:      General: Bowel sounds are normal. There is no distension.      Palpations: Abdomen is soft.      Tenderness: There is no abdominal tenderness.   Skin:     General: Skin is warm and dry.      Capillary Refill: Capillary refill takes less than 2 seconds.   Neurological:      Mental Status: She is alert.      Motor: Weakness present.      Comments: BLE weakness against gravity    Psychiatric:         Attention and Perception: She is inattentive. She perceives visual hallucinations.         Mood and Affect: Mood is anxious.         Behavior: Behavior is hyperactive.         Cognition and Memory: Cognition is impaired.         Data     IMAGING: (X-ray/CT/MRI)   Recent Results (from the past 24 hour(s))   Head CT w/o contrast    Narrative    EXAM: CT HEAD W/O CONTRAST  LOCATION: [facility]  DATE: 3/2/2024    INDICATION: ams fell from standing. Injury. Pain.  COMPARISON: None.  TECHNIQUE: Routine CT Head without IV contrast. Multiplanar reformats. Dose reduction techniques were used.    FINDINGS:  INTRACRANIAL CONTENTS: No intracranial hemorrhage, extraaxial collection, or mass effect.  No CT evidence of acute infarct. Mild presumed chronic small vessel ischemic changes. Moderate generalized volume loss. No hydrocephalus. Skull base vascular   calcifications. 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     VISUALIZED ORBITS/SINUSES/MASTOIDS: Prior bilateral cataract surgery. Visualized portions of the orbits are otherwise unremarkable. No paranasal sinus mucosal disease. No middle ear or mastoid effusion.    BONES/SOFT TISSUES: No acute abnormality.      Impression    IMPRESSION:  1.  No CT evidence for acute  intracranial process.  2.  Brain atrophy and presumed chronic microvascular ischemic changes as above.   CT Cervical Spine w/o Contrast    Narrative    EXAM: CT CERVICAL SPINE W/O CONTRAST  LOCATION: [facility]  DATE: 3/2/2024    INDICATION: fall, ams. Injury. Pain.  COMPARISON: None.  TECHNIQUE: Routine CT Cervical Spine without IV contrast. Multiplanar reformats. Dose reduction techniques were used.    FINDINGS:  Mid cervical dextroconvex curvature without congenital segmentation anomaly. Negative for fracture or traumatic malalignment. No extraspinal abnormality.     Craniovertebral junction and C1-C2: Normal.    C2-C3: Normal disc height. No herniation. Normal facets. No spinal canal or neural foraminal stenosis.     C3-C4: 2 mm degenerative anterolisthesis. Prominent left C4 nerve root sleeve which is nonspecific. Slight widening of the foramen. Nerve sheath tumor is a consideration. Tortuous left vertebral artery can sometimes produce a similar appearance. No   evidence for bony encroachment upon the neural foramina. Left facet DJD. Patent central canal. This could be confirmed with contrast-enhanced MRI as clinically needed.     C4-C5: 2 mm degenerative retrolisthesis. Uncinate spur with mild left foraminal stenosis. Patent central canal.     C5-C6: Uncinate spur with moderate left foraminal stenosis. Right foramen patent. Patent central canal.     C6-C7: Facet and uncinate degenerative changes with minimal left foraminal stenosis. Patent central canal.     C7-T1: Facet DJD with mild bilateral foraminal stenosis.       Impression    IMPRESSION:  1.  Negative for fracture or traumatic malalignment.    2.  Slight increased density and widening of the left C3-C4 foramen. Differential considerations as. Likely incidental in the setting of trauma.   XR Chest 2 Views    Narrative    EXAM: XR CHEST 2 VIEWS  LOCATION: [facility]  DATE: 03/02/2024    INDICATION:  Fall.  COMPARISON: None.      Impression    IMPRESSION: No acute infiltrates. Moderate-sized hiatal hernia. No pneumothorax. No definite displaced rib fracture. Scoliosis.     XR Pelvis 1/2 Views    Narrative    EXAM: XR PELVIS 1/2 VIEWS  LOCATION: [facility]  DATE: 3/2/2024    INDICATION: Pain after fall  COMPARISON: None.      Impression    IMPRESSION: Postoperative changes bilateral total hip arthroplasty. Components appear well seated. Pelvis negative for fracture. Degenerative change lower lumbar spine with convex left lumbar curvature. Vascular calcifications. Diffuse demineralization.       CBC with Diff:  Recent Labs   Lab Test 03/03/24  0435 05/06/17  1300 05/03/17  1309   WBC 7.8   < > 11.8*   HGB 12.5   < > 12.2   MCV 91   < > 92      < > 303   INR  --   --  1.02    < > = values in this interval not displayed.        Lactic Acid:    Recent Labs   Lab 03/03/24  0435 03/02/24  0610   LACT 4.2* 1.6       Comprehensive Metabolic Panel:  Recent Labs   Lab 03/03/24  0435 03/03/24  0413 03/02/24  0609     --  139   POTASSIUM 3.7  --  3.6   CHLORIDE 103  --  104   CO2 20*  --  22   ANIONGAP 16*  --  13   *   < > 104*   BUN 14.3  --  16.8   CR 0.63  --  0.57   GFRESTIMATED 82  --  84   PRAKASH 9.0  --  9.5   MAG  --   --  1.9   PROTTOTAL  --   --  6.2*   ALBUMIN  --   --  3.7   BILITOTAL  --   --  0.5   ALKPHOS  --   --  70   AST  --   --  41   ALT  --   --  16    < > = values in this interval not displayed.       UA:  Recent Labs   Lab 03/02/24  0627   COLOR Light Yellow   APPEARANCE Clear   URINEGLC Negative   URINEBILI Negative   URINEKETONE Negative   SG 1.008   UBLD Negative   URINEPH 5.5   PROTEIN Negative   NITRITE Negative   LEUKEST Negative   RBCU 2   WBCU 1       Time Spent on this Encounter   I spent 60 minutes (9105 - 4026) of critical care time on the unit/floor managing the care of Lori Fall. Upon evaluation, this patient had a high probability of  imminent or life-threatening deterioration due to hypovolemia, which required my direct attention, intervention, and personal management. 100% of my time was spent at the bedside counseling the patient and/or coordinating care regarding services listed in this note.    Marta Barrera NP  [facility]  Securely message with the Vocera Web Console (learn more here)  Text page via [employer] Paging/Directory

## 2024-03-04 NOTE — PROGRESS NOTES
3/4/24; [id number]    A&O X1; A2 lift ( at this time); Guzman patent with adequate output; OT saw patient ( [facility] 6/30); EEG complete; TELE NSR; WOC saw patient with new wound care orders; neuros intact; patient very sleepy, today. T&R ; plan for TCU when medically ready.

## 2024-03-04 NOTE — DISCHARGE INSTRUCTIONS
Wound Care:    Left lateral ankle and spine wound(s): Daily  and PRN for soiled/loose dressing  Lift Mepilex dressing to assess wounds for signs of deterioration.   Cleanse with wound cleanser and soft cloth. Pat dry.  Keep wounds covered with Mepilex 4x4 foam dressing.   Follow Pressure Injury Prevention Plan.

## 2024-03-04 NOTE — PROGRESS NOTES
Cristina from [facility] was vocera messaged, inquiring as to whether she changed the wound care dressings, during her visit. Awaiting a response.

## 2024-03-04 NOTE — PROGRESS NOTES
03/04/24 1643   Appointment Info   Signing Clinician's Name / Credentials (PT) Neftaly Wise DPT   Living Environment   People in Home alone   Current Living Arrangements independent living facility   Home Accessibility no concerns   Transportation Anticipated family or friend will provide   Living Environment Comments Patient lives alone in ILF   Self-Care   Usual Activity Tolerance moderate   Current Activity Tolerance poor   Equipment Currently Used at Home walker, rolling   Fall history within last six months yes   Number of times patient has fallen within last six months 2   Activity/Exercise/Self-Care Comment Independent with ADL and functional mobility with 2WW at baseline. Patient manages her own medications and simple meal prep. Has assist for housekeeping. Pt's niece assist with bill pay, transportation, shopping, and other IADL.   General Information   Onset of Illness/Injury or Date of Surgery 03/02/24   Referring Physician Marta Barrera, NP   Patient/Family Therapy Goals Statement (PT) Get better   Pertinent History of Current Problem (include personal factors and/or comorbidities that impact the POC) Lori Fall is a 89 year old female admitted on 3/2/2024. She presented to the [facility] ER following a fall at home.  She was on the ground for at least 4-6 hours.  She does not believe she had syncope and thinks she tripped.   Existing Precautions/Restrictions fall   Cognition   Affect/Mental Status (Cognition) confused   Orientation Status (Cognition) oriented to;person;place;situation   Follows Commands (Cognition) follows one-step commands   Pain Assessment   Patient Currently in Pain Yes, see Vital Sign flowsheet  (R shoulder pain)   Range of Motion (ROM)   ROM Comment Limited to RUE d/t pain   Strength (Manual Muscle Testing)   Strength Comments Generalized weakness noted w/ mobility and transfers no formal testing completed   Bed Mobility   Comment, (Bed Mobility) Supine to sitting EOB w/  Min A x2   Transfers   Comment, (Transfers) Sit to stand w/ FWW and Mod A x2   Gait/Stairs (Locomotion)   Comment, (Gait/Stairs) Defered   Balance   Balance Comments Poor standing balance   Coordination   Coordination Comments Poor coordination to BUEs for finger to nose and finger to finger   Clinical Impression   Criteria for Skilled Therapeutic Intervention Yes, treatment indicated   PT Diagnosis (PT) Impaired ambulation   Influenced by the following impairments Impaired strength, balance and activity tolerance   Functional limitations due to impairments Impaired ADLs, IADLs and functional mobility   Clinical Presentation (PT Evaluation Complexity) stable   Clinical Presentation Rationale Clinical judgment   Clinical Decision Making (Complexity) low complexity   Planned Therapy Interventions (PT) balance training;bed mobility training;gait training;home exercise program;patient/family education;strengthening;ROM (range of motion);neuromuscular re-education;transfer training;progressive activity/exercise   Risk & Benefits of therapy have been explained evaluation/treatment results reviewed;care plan/treatment goals reviewed;risks/benefits reviewed;current/potential barriers reviewed;participants voiced agreement with care plan;participants included;patient   PT Total Evaluation Time   PT Eval, Low Complexity Minutes (86798) 8   Physical Therapy Goals   PT Frequency 5x/week   PT Predicted Duration/Target Date for Goal Attainment 03/14/24   PT Goals Bed Mobility;Transfers;Gait   PT: Bed Mobility Independent;Supine to/from sit   PT: Transfers Modified independent;Sit to/from stand;Assistive device   PT: Gait Modified independent;Rolling walker;150 feet   Interventions   Interventions Quick Adds Therapeutic Activity   Therapeutic Activity   Therapeutic Activities: dynamic activities to improve functional performance Minutes (44613) 23   Symptoms Noted During/After Treatment Fatigue   Treatment Detail/Skilled  Intervention Pt supine in bed at start of session. Agreeable to session. Niece in room for session. Needing cues to complete supine to sit transfer. BLEs noted to be stiff w/ movement. Log roll technique completed. Cues for sequencing LEs and UEs. Once seated EOB initially needing Min A x1 for seated balance. Unable to scoot self closer towards EOB. Pt completing sit to stand x3 w/ FWW and Mod A x2. On standing Pt w/ heavy posterior lean and LEs slidding foward. Difficulty maintaining standing position. Unable to follow cues to bring LEs backwards and instead brining forwards. Pt completing sit to stand w/ lanette stedy and Min A x2 to Min A x1 and Mod A x1. Completing sit to stand from paddle height x5 w/ Min A x2 progressing to Min A x1 and CGA x1. Pt completing an additional x2 sit to stands w/ lanette stedy from bed height and Min A x2. Cued throughout for forward weight shifting and keeping chest forward towards lanette stedy bar. Improved tolerance throughout session. Able to stand for prolonged period of time in lanette stedy. Completing an additional x2 sit to stand w/ FWW and Min A x2. Better forward weight shift and cueing LEs to bring backwards. Stand pivot transfer from EOB to bedside chair w/ Mod A x2. Cued for LE manegment. Again w/ posterior weight shift. Min A x1 and Mod A x1 for supine to sit transfer. A x2 to boost higher in bed. All needs met at end of session w/ call light in place and bed alarm on.   PT Discharge Planning   PT Plan Bed mobility, transfers as able, repeat STS, gait as able   PT Discharge Recommendation (DC Rec) Transitional Care Facility   PT Rationale for DC Rec Pt well below baseline mobility. Currently needing A x1-2 for all functional mobility. Anticipate when medically ready Pt will need TCU to improve upon functional mobility and strengthening.   PT Brief overview of current status A x2 w/ FWW for stand pivot transfer   Total Session Time   Timed Code Treatment Minutes 23   Total  Session Time (sum of timed and untimed services) 31

## 2024-03-04 NOTE — PROVIDER NOTIFICATION
Dr. Lucy guy text regarding keeping a ornelas in place for retention. Her anatomy makes it difficult to straight cath.    May keep ornelas in.

## 2024-03-04 NOTE — PROGRESS NOTES
"Neurology Progress Note      Subjective    Patient continues to be very confused.  Currently perseverating on some (reportedly non-existent) family issues.  No new symptoms.      Medications:    Current Facility-Administered Medications   Medication    acetaminophen (TYLENOL) tablet 650 mg    Or    acetaminophen (TYLENOL) Suppository 650 mg    amLODIPine (NORVASC) tablet 10 mg    [Held by provider] lisinopril (ZESTRIL) tablet 40 mg    melatonin tablet 1 mg    metoprolol tartrate (LOPRESSOR) tablet 50 mg    ondansetron (ZOFRAN ODT) ODT tab 4 mg    Or    ondansetron (ZOFRAN) injection 4 mg    QUEtiapine (SEROquel) half-tab 12.5 mg    senna-docusate (SENOKOT-S/PERICOLACE) 8.6-50 MG per tablet 1 tablet    Or    senna-docusate (SENOKOT-S/PERICOLACE) 8.6-50 MG per tablet 2 tablet    sodium chloride 0.9 % infusion    sodium chloride 0.9% BOLUS 500 mL        Objective    Neurological examination  BP (!) 146/82 (BP Location: Right arm)   Pulse 78   Temp 97.8  F (36.6  C) (Oral)   Resp 16   Wt 56.3 kg (124 lb 1.9 oz)   LMP  (LMP Unknown)   SpO2 95%   BMI 22.70 kg/m        Limited exam, patient awake - oriented to self and fact that she is in a hospital, oriented to month but year stated as \"4\"  Difficult to further assess - face appears symmetric, moving eyes in all directions  Moving all extremities to command, ,reacts to stimulation throughout      Diagnostic Work-up Review:    CT Head without Contrast (3/2/2024)  IMPRESSION:  1.  No CT evidence for acute intracranial process.  2.  Brain atrophy and presumed chronic microvascular ischemic changes as above.    Interim imaging reviewed      Interim lab results reviewed   03/04/24 06:35   Sodium 141   Potassium 3.6   Chloride 109 (H)   Carbon Dioxide (CO2) 18 (L)   Urea Nitrogen 13.5   Creatinine 0.46 (L)   GFR Estimate 88   Calcium 8.5   Anion Gap 14      03/04/24 06:35   WBC 7.7   Hemoglobin 12.4   Hematocrit 37.6   Platelet Count 237   RBC Count 4.19   MCV 90   MCH " 29.6   MCHC 33.0   RDW 15.5 (H)       TSH - 1.60  Vitamin B12 - 471  Ammonia - 12      Impression:  89 year old female presented 3/2/2023 with confusion after a fall (stated as mechanical, tripped on way to bathroom per report).  There may have been some cognitive decline at baseline, but stated as quite mild and she reportedly functions well at baseline.  Lab work thus far has been unrevealing, there are some vitamin levels that are pending.  Head CT unrevealing, EEG has been ordered and read is pending (will be done by Dr. Davalos).      Recommendations:  - Vitamins B6, E pending - as is EEG read  - If testing reveals no clear pathology - may have to assume this to be a concussion and perhaps element of hospital delirium to which her age would make her particularly vulnerable  - Appreciate OT involvement    I spent 42 minutes on the date of encounter with the patient and before and after the visit on activities detailed in the above note which may include reviewing the EMR, documenting clinical information, and communicating with other health care professionals.    Saúl Cerna MD  [facility] [phone]

## 2024-03-04 NOTE — PROGRESS NOTES
03/04/24 1110   Appointment Info   Signing Clinician's Name / Credentials (OT) Naseem Hand, OTR/L   Living Environment   People in Home alone   Current Living Arrangements independent living facility   Home Accessibility no concerns   Transportation Anticipated family or friend will provide   Living Environment Comments Patient lives alone in ILF   Self-Care   Usual Activity Tolerance moderate   Current Activity Tolerance poor   Equipment Currently Used at Home walker, rolling  (2WW)   Fall history within last six months yes   Number of times patient has fallen within last six months 2   Activity/Exercise/Self-Care Comment Independent with ADL and functional mobility with 2WW at baseline. Patient manages her own medications and simple meal prep. Has assist for housekeeping. Pt's niece assist with bill pay, transportation, shopping, and other IADL.   General Information   Onset of Illness/Injury or Date of Surgery 03/02/24   Referring Physician Pk De La Cruz PA-C   Existing Precautions/Restrictions fall   Limitations/Impairments safety/cognitive   Cognitive Status Examination   Cognitive Status Comments Patient is oriented to self and place. Unable to state year but oriented to month. Confused regarding situation.   Cognitive Screens/Assessments   Cognitive Assessments Completed [facility] Mental Status Exam ([facility]):  Total Score out of /30 6   [facility] Norms 1-20 equals dementia   [facility] Interpretation The UMS ([facility] Mental Status Exam) is a 30-point standardized cognitive screen used to identify the presence of cognitive deficits and/or to identify a change in cognition over time.  This screen assesses cognitive abilities in various domains.  [email or web address]     Patient's performance was as follows:    Orientation and Attention: 1/3      Memory: Delayed Recall with Interference: 0/5      Calculation and Registration: 1/3      Category Naming with Time Contraint: 1/3       Registration and Digit Span: 0/2      Clock drawin/4      Visual Spatial: 1/2      Story Recall with Executive Function: 2/8         Total Score: 6/30     Score Interpretation: Score places patient in the Dementia category.  Patient demonstrates deficits in the following areas: attention, delayed recall, executive functions, clock drawing, and orientation.  Please note that this examination is used to screen individuals to look for the presence of cognitive deficits and to identify changes in cognition over time.  This is not a diagnosis.  This examination can be followed by further cognitive assessments if appropriate and deemed necessary.   Visual Perception   Visual Impairment/Limitations corrective lenses full-time;WFL   Range of Motion Comprehensive   Comment, General Range of Motion Moderate AROM deficits at B shoulders   Strength Comprehensive (MMT)   Comment, General Manual Muscle Testing (MMT) Assessment Very deconditioned   Coordination   Coordination Comments R UE dysmetria. Off by 1-2 inches with finger to nose test   Bed Mobility   Bed Mobility supine-sit;sit-supine;scooting/bridging   Scooting/Bridging [place] (Bed Mobility) 2 person assist   Supine-Sit [place] (Bed Mobility) maximum assist (25% patient effort)   Sit-Supine [place] (Bed Mobility) maximum assist (25% patient effort);2 person assist   Transfers   Transfers sit-stand transfer   Sit-Stand Transfer   Sit-Stand [place] (Transfers) maximum assist (25% patient effort)   Balance   Balance Comments Impaired balance w/ history of falls   Activities of Daily Living   BADL Assessment/Intervention lower body dressing;grooming   Lower Body Dressing Assessment/Training   [place] Level (Lower Body Dressing) dependent (less than 25% patient effort)   Grooming Assessment/Training   [place] Level (Grooming) maximum assist (25% patient effort)   Clinical Impression   Criteria for Skilled Therapeutic Interventions Met  (OT) Yes, treatment indicated   OT Diagnosis Decline in function   OT Problem List-Impairments impacting ADL problems related to;activity tolerance impaired;balance;cognition;coordination;mobility;strength   Assessment of Occupational Performance 5 or more Performance Deficits   Identified Performance Deficits Dressing, bathing, toileting, functional mobility, home mgmt, meal prep, med mgmt, and other IADL   Planned Therapy Interventions (OT) ADL retraining;cognition;strengthening;transfer training;progressive activity/exercise   Clinical Decision Making Complexity (OT) problem focused assessment/low complexity   Risk & Benefits of therapy have been explained evaluation/treatment results reviewed;care plan/treatment goals reviewed;risks/benefits reviewed;current/potential barriers reviewed;participants voiced agreement with care plan;participants included;patient  (Niece)   OT Total Evaluation Time   OT Eval, Low Complexity Minutes (52116) 10   OT Goals   Therapy Frequency (OT) 3 times/week   OT Predicted Duration/Target Date for Goal Attainment 03/11/24   OT Goals Hygiene/Grooming;Upper Body Bathing;Lower Body Dressing;Upper Body Dressing;Toilet Transfer/Toileting   OT: Hygiene/Grooming supervision/stand-by assist;using adaptive equipment   OT: Upper Body Dressing Supervision/stand-by assist;using adaptive equipment   OT: Lower Body Dressing Supervision/stand-by assist;using adaptive equipment   OT: Upper Body Bathing Supervision/stand-by assist;using adaptive equipment   OT: Toilet Transfer/Toileting Supervision/stand-by assist;toilet transfer;cleaning and garment management;using adaptive equipment   Self-Care/Home Management   Self-Care/Home Mgmt/ADL, Compensatory, Meal Prep Minutes (26553) 9   Symptoms Noted During/After Treatment (Meal Preparation/Planning Training) fatigue   Treatment Detail/Skilled Intervention Pt is seated on EOB for ADL. Instructing pt to grab and don socks. Requires extended time to  initiate and slow to move w/ this ADL. Cues for grasping socks and forward bending to don. Pt coming w/in a few inches of toes but stopping, does not thread w/ verbal cues. Requires total A to don. Cues for posture / balance at EOB w/ LB dressing. Instructing pt to grab comb and comb hair at EOB. pt reaching toward hair w/ comb but does not stoke through hair appropriatly, using comb backwards. Tactile cues and hand over hand assist to correct. Pt unable to each most of hair given R UE weakness vs confusion. Max A to comb hair. Also provided education to patient and niece regarding SLUMS results, delirium, and TCU recommendation.   Therapeutic Activities   Therapeutic Activity Minutes (65958) 18   Symptoms noted during/after treatment fatigue   Treatment Detail/Skilled Intervention Pt transfers to EOB w/ max cueing and max A. Cued for walking LEs to EOB and for use of UEs on bed rail, then sitting up w/ max A. Initially mod A balance at EOB but progressing to CGA then SBA w/ VCs and TCs for posture at EOB. Cues to scoot fully to EOB and shift hips for symmetry. Requires mod A to position at EOB. STS x1  w/ max A and cues for hand position and feet position and forward lean. Unable to reach full stand and pt very unsteady. Attempting to initiate second stand but pt unable. Ax2 to transfer supine and scoot to HOB. VCs for body position throughout. Pt rollng side to side for sheet and chux management in bed. Min A to roll L and R and VCs for hand use on bed rail. Positioning pt in semi-fowlers w/ alarm on and all needs in reach.   OT Discharge Planning   OT Plan ADL at EOB or commode transfer as able, monitor cognition, command following   OT Discharge Recommendation (DC Rec) Transitional Care Facility   OT Rationale for DC Rec Patient is presenting below baseline of independent. Limited by new / worsening cognitive deficits and weakness. Not requiring Ax1-2 for ADL and functional mobility and ADL. Recommend TCU to  progress ADL independence. Anticipate pt will benefit from higher level of care long term such as FDC.   OT Brief overview of current status Max A supine to sit, dep LB dress, max A grooming, Ax2 sit > supine.   Total Session Time   Timed Code Treatment Minutes 27   Total Session Time (sum of timed and untimed services) 37

## 2024-03-04 NOTE — CONSULTS
"[facility] Nurse Inpatient Assessment     Consulted for: Left arm/skin injury     Summary: 1) Type 1 skin tears to left forearm- [facility] nurse will sign off.  2) DTPI to left lateral ankle  3) Stage 1 PIs to spine    Patient History (according to provider note(s):      \"Daughter reports she found Lori on the floor laying on her back. Daughter reports patient's memory has been worsening, but notes that she is more confused than normal today. She states Lori complained of back pain when she arrived at her house, but has not complained about it since. Denies prior falls in the past.\"    Assessment:      Areas visualized during today's visit: Posterior surfaces , Sacrum/coccyx, Lower extremities , and Upper extremities     Skin Injury Location: Left forearm        Last photo: 3/4  Skin injury due to: Skin tear and Trauma  Skin history and plan of care:   Patient fell PTA. Scattered Type 1 skin tears noted to arm. Covered with oil emulsion gauze and Mepilex 4x4 dressings.  Affected area:      Skin assessment: Dry/flaky, Ecchymosis, and Erythema     Measurements (length x width x depth, in cm) scattered over forearm     Color: purple and red     Temperature  normal      Drainage: scant .      Color: serosanguinous      Odor: none  Pain: denies , none  Pain interventions prior to dressing change: no significant pain present  and slow and gentle cares   Treatment goal: Heal , Drainage control, Infection control/prevention, and Protection  STATUS: initial assessment  Supplies ordered: gathered, at bedside, supplies stored on unit, discussed with RN, and discussed with patient       Pressure Injury Location: Left lateral ankle        Last photo: 3/4  Wound type: Pressure Injury, Friction, and Trauma     Pressure Injury Stage: Deep Tissue Pressure Injury (DTPI), present on admission      Wound history/plan of care:   Patient fell PTA and was down for unknown amount of time. Wound uncovered on " assessment.    Wound base:  non-blanchable, maroon, and epidermis,      Palpation of the wound bed: normal      Drainage: none     Description of drainage: none     Measurements (length x width x depth, in cm) 5  x 1  x  0 cm      Tunneling N/A     Undermining N/A  Periwound skin: Erythema- blanchable      Color: pink      Temperature: normal   Odor: none  Pain: denies , none  Pain intervention prior to dressing change: no significant pain present  and slow and gentle cares   Treatment goal: Heal  and Protection  STATUS: initial assessment and evolving  Supplies ordered: gathered, at bedside, supplies stored on unit, discussed with RN, and discussed with patient     My PI Risk Assessment     Sensory Perception: 2 - Very Limited     Moisture: 3 - Occasionally moist      Activity: 1 - Bedfast      Mobility: 2 - Very limited     Nutrition: 2 - Probably inadequate      Friction/Shear: 1 - Problem     TOTAL: 11    Pressure Injury Location: Spine        Last photo: 3/4  Wound type: Pressure Injury, Friction, and Trauma     Pressure Injury Stage: 1, present on admission      Wound history/plan of care:   See above    Wound base:  non-blanchable, erythema, and epidermis,     Palpation of the wound bed: normal      Drainage: none     Description of drainage: none     Measurements (length x width x depth, in cm) Three non-blanchable spots within 6.5  x 1.5  x  0 cm area on spine     Tunneling N/A     Undermining N/A  Periwound skin: Intact      Color: normal and consistent with surrounding tissue      Temperature: normal   Odor: none  Pain: denies , none  Pain intervention prior to dressing change: no significant pain present  and slow and gentle cares   Treatment goal: Heal  and Protection  STATUS: initial assessment and evolving  Supplies ordered: gathered, at bedside, supplies stored on unit, discussed with RN, and discussed with patient     Treatment Plan:     Left lateral ankle and spine wound(s): Daily  and PRN for  "soiled/loose dressing  Lift Mepilex dressing to assess wounds for signs of deterioration.   Cleanse with wound cleanser and soft cloth. Pat dry.  Keep wounds covered with Mepilex 4x4 foam dressing.   Follow PIP Plan.     Left forearm skin tears: Every 3 days and PRN for soiled/loose dressing  Cleanse with wound cleanser and soft cloth.  Cover with Optifoam Gentle border dressing.    Pressure Injury Prevention (PIP) Plan:  -If patient is declining pressure injury prevention interventions: Explore reason why and address patient's concerns, Educate on pressure injury risk and prevention intervention(s), If patient is still declining, document \"informed refusal\" , and Ensure Care team is aware ( provider, charge nurse, etc)  -Mattress: Follow bed algorithm, reassess daily and order specialty mattress, if indicated.  -HOB: Maintain at or below 30 degrees, unless contraindicated  -Repositioning in bed: Every 1-2 hours , Left/right positioning; avoid supine, and Raise foot of bed prior to raising head of bed, to reduce patient sliding down (shear)  -Heels: Keep elevated off mattress and Pillows under calves  -Protective Dressing: None  -Positioning Equipment:  Pillows  -Chair positioning: Assist patient to reposition hourly and Do NOT use a donut for sitting (this increases pressure to smaller area and creates a higher potential for injury)    -Moisture Management: Perineal cleansing /protection: Follow Incontinence Protocol, Avoid brief in bed, and Moisturize dry skin  -Under Devices: Inspect skin under all medical devices during skin inspection , Ensure tubes are stabilized without tension, and Ensure patient is not lying on medical devices or equipment when repositioned     Orders: Written    RECOMMEND PRIMARY TEAM ORDER: None, at this time  Education provided: importance of repositioning, plan of care, and Off-loading pressure  Discussed plan of care with: Patient, Family, and Nurse  [facility] nurse follow-up plan: signing " "off left forearm, will see weekly for PIs to ankle and spine  Notify [facility] if wound(s) deteriorate.  Nursing to notify the Provider(s) and re-consult the WO Nurse if new skin concern.    DATA:     Current support surface: Standard  Standard gel/foam mattress (IsoFlex, Atmos air, etc)  Containment of urine/stool: Incontinence Protocol, Incontinent pad in bed, and Indwelling catheter  BMI: Body mass index is 22.7 kg/m .   Active diet order: Orders Placed This Encounter      Regular Diet Adult     Output: I/O last 3 completed shifts:  In: 4079 [P.O.:220; I.V.:3859]  Out: 3200 [Urine:3200]     Labs:   Recent Labs   Lab 03/04/24  0635 03/03/24  1321   ALBUMIN  --  3.3*   HGB 12.4 12.0   WBC 7.7 7.9     Pressure injury risk assessment:   Sensory Perception: 3-->slightly limited  Moisture: 3-->occasionally moist  Activity: 2-->chairfast  Mobility: 2-->very limited  Nutrition: 3-->adequate  Friction and Shear: 2-->potential problem  Odilon Score: 15    Cristina Morfin RN, CWOCN  Please contact via [employer] at name or group \"[facility] nurse\"- M-F 8A-4P  Leave  @ *[phone] for non-urgent needs. Checked occasionally M-F.   "

## 2024-03-04 NOTE — PLAN OF CARE
"[id number]: Able to follow one step commands and answer simple questions appropriately. Overall, remains confused and speech illogical at times. Mentation seems to wax and wane. At one point pt was found with both feet wedged into side rail opening (L ankle and R toe abrasions cleansed) and calling out to the \"man on the ceiling.\" (Seizure pad placed to cushion and cover opening). Unable to obtain orthostatics as patient becomes terrified when HOB is raised despite attempts to explain and calm her down. Not impulsive or trying to get out of bed. Able to help roll in bed for cares. Pt did sleep for several hours overnight, an improvement from the night previous.     Retaining urine. Bladder scanned for 612mL. 3 unsuccessful straight cath attempts, pt did not tolerate well. Obtained order for a Ornelas. 950mL out w/ initial ornelas placement.     PRIMARY Concern: Fall w/ increased confusion  SAFETY RISK Concerns (fall risk, behaviors, etc.): Fall risk      Isolation/Type: N/A  Tests/Procedures for NEXT shift: orthostatic vitals. EEG, labs  Consults? SLP, OT, SW/CC, WOC to see. Neuro following.     Where is patient from? (Home, TCU, etc.): Home, lives alone  Other Important info for NEXT shift: Christina Mosher, is point of contact  Anticipated DC date & active delays: pending clinical progress.  _____________________________________________________________________________  SUMMARY NOTE:  Orientation/Cognitive: Oriented to self and month, and most times year. Names Karyn as President.   Observation Goals (Met/ Not Met): NOT MET  Mobility Level/Assist Equipment: heavy Ax2 GB/W although not OOB this shift r/t pt become scared w/ movement and feeling like she is falling  Antibiotics & Plan (IV/po, length of tx left): N/A  Pain Management: Denies  Tele/VS/O2: Tele: SR. VSS on RA.   ABNL Lab/BG: Trops flat. CK: 689. Lactic 4.1 during RRT, improved to 2.1 this AM, continue to monitor.    Diet: Regular  Bowel/Bladder: No BM overnight. " Adequate output. Purewick in place.   Skin Concerns: Several pea-sized skin tears L elbow, L forearm, and R elbow. LUE dressings CDI. Pt picked off RUE dressing, attempted to replace and pt picked off again so left open to air.  Drains/Devices: Guzman placed for retention. 1 large, soft BM this shift. Incontinent.

## 2024-03-04 NOTE — PROGRESS NOTES
[facility]    Medicine Progress Note - Hospitalist Service    Date of Admission:  3/2/2024    Assessment & Plan   Lori Fall is a 89 year old female who was admitted on 3/2/2024.      Past medical history significant for HTN, HLP, Osteopenia, History of PUD with GI bleed who was registered to short-stay/observation due to fall resulting in acute rhabdomyolysis.       Patient presented to the [facility] ED following a fall at home.  Patient could not provide much detail regarding the fall but believe it was mechanical in nature as she thinks she tripped.  She was unable to get up off the ground and remained on the ground for at least 4-6 hours.       Work-up in the ED included a CMP that revealed a total protein of 6.2 and glucose of 104 otherwise within normal limits.  CBC with diff was unremarkable.  UA had mucus present otherwise was negative.    POCT lactic acid level was within normal limits at 1.6.  POCT VBG with O2 sat of 69 otherwise within normal limits.  Troponin T elevated at 47.  Total CK elevated at 687.       Imaging studies completed in the ED included a head CT w/o contrast, cervical spine CT w/o contrast 2 view chest Xray and 1/2 views pelvic  Xray that were negative for acute findings.  These did reveal moderate-sized hiatal hernia, slightly increased density and widening of the left C3-C4 foramen (incidental), post-op bilateral VAISHALI.          Suspected Hospital acquired delirium  Hallucinations  *Patient's niece was present in the room when assessed 3/3.  It was reported patient resided independently and has some slight memory issues (began ~ 6 months ago) but currently is far from her baseline.    *Discussion with House LYDIA and nursing staff 3/3; patient appears to be hallucinating and responding to internal stimuli.    *3/3; patient believes she is at [employer].  Was very slow to answer questions regarding her name but was able to answer appropriately.  She knew it was  March 2024 and that Karyn is the current president.  She was unable to state who her niece was.    -  Delirium prevention:              --Reorient patient at each interaction.  --Give patient window bed if possible.  --Keep room lights on and blinds open during day (8am-8pm), low lights 8pm-8am.  --Minimize interruptions of sleep at night (consolidate vitals, nursing assessments, medications).  -  PRN PO Seroquel available.     Fall (Suspect mechanical but patient doesn't entirely remember events)  Left arm skin tear with fall  Rhabdomyolysis  -  IV fluids at 100 ml/hr - transition to LR 3/4  -  Trend total CK.               --687-->566-->687 --> 1,158 3/4 - continue IVF as tolerated (echo with normal EF)  -  PT consult requested.    -  SW/CM consult requested.    -  Wound consult for skin tear (not available over the weekends).      Troponin elevation  -  Troponin trend:               --47-->50-->44.    -  ECHO in process.    -  Monitor on telemetry.       Acute hypotension (Resolved)  Elevated lactic acid level  Known HTN  *RRT called early morning 3/3 due to AMS and hypotension.  Briefly discussed with House LYDIA, Marta Barrera CNP.  Please see RRT note for full details.                 **Noted elevated lactic acid level of 4.2 and BP of 87/46.                 **Received IV fluid bolus 1000 ml NS.    -  Lactic acid level monitor:               --1.6-->4.2-->2.1  -  3/4 continue to hold lisinopril 40 mg/d... RESUME PTA amlodipine. Monitor.  -  Resumed on PTA metoprolol 100 mg BID with hold parameters.    -  Continue with IV fluids.       History of gastric ulcer with GI bleed 2017  -  No acute GI complaints.  No interventions.       Hyperlipidemia  -  Hold PTA simvastatin 40 mg at bedtime given rhabdomyolysis.       Physical deconditioning  -  PT consult requested.             Diet: Regular Diet Adult    DVT Prophylaxis: Pneumatic Compression Devices  Guzman Catheter: PRESENT, indication: Acute retention or  obstruction  Lines: None     Cardiac Monitoring: ACTIVE order. Indication: Syncope- high cardiac risk (48 hours)  Code Status: No CPR- Do NOT Intubate      Clinically Significant Risk Factors Present on Admission              # Hypoalbuminemia: Lowest albumin = 3.3 g/dL at 3/3/2024  1:21 PM, will monitor as appropriate     # Hypertension: Home medication list includes antihypertensive(s)                 Disposition Plan      Expected Discharge Date: 03/05/2024        Discharge Comments: fall at home, on floor for 4-6hrs; rhabdo  needs EEG  WOC for skin tear  RRT early am 3/3; lactic 4.2; delerium  RRT on days  ornelas placed for retention  PT to see            Lenard Garsia MD  Hospitalist Service  [facility]  Securely message with [employer] (more info)  Text page via [employer] Paging/Directory   ______________________________________________________________________    Interval History   Care assumed today. Pt seen and examined. No new complaints. Denies new pain, sob, fevers or chills.    Physical Exam   Vital Signs: Temp: 97.8  F (36.6  C) Temp src: Oral BP: (!) 146/82 Pulse: 78   Resp: 16 SpO2: 95 % O2 Device: None (Room air)    Weight: 124 lbs 1.9 oz    Gen: NAD, pleasant  HEENT: EOMI, MMM  Resp: no focal crackles,  no wheezes, no increased work of resp  CV: S1S2 heard, reg rhythm, reg rate  Abdo: soft, nontender, nondistended, bowel sounds present  Ext: calves nontender, well perfused  Neuro: aa, conversing, oriented to self, forgetful of situation in general, CN grossly intact, no facial asymmetry      Medical Decision Making       39 MINUTES SPENT BY ME on the date of service doing chart review, history, exam, documentation & further activities per the note.      Data     I have personally reviewed the following data over the past 24 hrs:    7.7  \   12.4   / 237     141 109 (H) 13.5 /  101 (H)   3.6 18 (L) 0.46 (L) \     ALT: 23 AST: 66 (H) AP: 61 TBILI: 0.5   ALB: 3.3 (L) TOT PROTEIN:  5.6 (L) LIPASE: N/A     TSH: 1.60 T4: N/A A1C: N/A     Procal: 0.07 CRP: N/A Lactic Acid: 1.2

## 2024-03-04 NOTE — PROGRESS NOTES
PRIMARY Concern: Fall w/ increased confusion  SAFETY RISK Concerns (fall risk, behaviors, etc.): Fall risk      Isolation/Type: N/A  Tests/Procedures for NEXT shift: Echo and X-ray resulted. EEG pending.  Consults? Neurology following. PT, WOC, Speech, SW pending.  Where is patient from? (Home, TCU, etc.): Home  Other Important info for NEXT shift: Niece, Christina, is point of contact. 2 RRT called today. Neuro checks Q 4 hrs. Lab called for to draw labs for ammonia, TSH, Vit B12, Folate, etc. Awaiting lab.  Anticipated DC date & active delays: pending clinical progress.  ______________________________________________________________________  SUMMARY NOTE:  Orientation/Cognitive: Alert to self. Disoriented to time, place, situation. Speech garbled. Intermittently full body tremors.  Observation Goals (Met/ Not Met): Inpt  Mobility Level/Assist Equipment: heavy Ax2 GB/W   Antibiotics & Plan (IV/po, length of tx left): N/A  Pain Management:   Tele/VS/O2: Tele: SR. VSS on RA.   ABNL Lab/BG: Trops flat. CK: 689, AST- 66.  Diet: Regular.   Bowel/Bladder: Incontinent of b/b. Incontinent X1, brief changed. 2 Small loose stool this shift. Bladder scanned 600ml. Attempted X1 straight cath unsuccessful.  Skin Concerns: Skin tears L elbow, L forearm, and R elbow, mapilex in place.   Drains/Devices: NS infusing 100 mL/hr.   Patient Stated Goal for Today:

## 2024-03-05 NOTE — PLAN OF CARE
PRIMARY Concern: Fall, increasing confusion.   SAFETY RISK Concerns (fall risk, behaviors, etc.): yes, fall risk      Isolation/Type: none  Tests/Procedures for NEXT shift: Neuro check q4  Consults? (Pending/following, signed-off?) Neurology following. Speech/SW consult pending. OT/PT, WOC consulted.  Where is patient from? (Home, TCU, etc.): ILF  Other Important info for NEXT shift: OT slums score 6/30  Anticipated DC date & active delays: Pending clinical improvement and TCU placement.   ___________________________________________  SUMMARY NOTE:                Orientation/Cognitive: Alert Oriented to self.  Observation Goals (Met/ Not Met): Inpatient  Mobility Level/Assist Equipment: AX2 gb/walker. T/R.   Antibiotics & Plan (IV/po, length of tx left): none  Pain Management: Denies pain this shift  Tele/VS/O2: VSS on RA. Tele SR  ABNL Lab/BG: CK- 1,158.  Diet: Reg  Bowel/Bladder: Guzman cath  Skin Concerns: Skin tear bilat elbow and lower arms. Dressings c/d/i  Drains/Devices: LR infusing 75 mL/hr.  Patient Stated Goal for Today: none

## 2024-03-05 NOTE — PROGRESS NOTES
Top portion must ALWAYS be completed  PRIMARY Concern: Fall, increasing confusion.   SAFETY RISK Concerns (fall risk, behaviors, etc.): yes, fall risk      Isolation/Type: none  Tests/Procedures for NEXT shift: EEG resulted.  Consults? (Pending/following, signed-off?) Neurology following. Speech/SW consult pending. OT/PT, WOC consulted.  Where is patient from? (Home, TCU, etc.): ILF  Other Important info for NEXT shift: OT slums score 6/30 today. Neuro checks Q 4 hrs.  Anticipated DC date & active delays: Pending clinical improvement and TCU placement.   _____________________________________________________________________  SUMMARY NOTE:                Orientation/Cognitive: Alert Oriented to self.  Observation Goals (Met/ Not Met): Inpt  Mobility Level/Assist Equipment: AX2 gb/walker. T/R.   Antibiotics & Plan (IV/po, length of tx left): none  Pain Management: Pt resting comfortably. Prn tylenol.  Tele/VS/O2: VSS on RA ex slight BP elevated. Tele- NSR  ABNL Lab/BG: CK- 1,158.  Diet: Reg.   Bowel/Bladder: Guzman cath  Skin Concerns: Skin tear bila elbow and lower arms dressing in placed. L ankle, R toe abrasion wound- dressing mapilex in place. Scattered bruised L Upper extremities.   Drains/Devices: LR infusing 75 mL/hr.  Patient Stated Goal for Today: none    .

## 2024-03-05 NOTE — PROVIDER NOTIFICATION
MD Notification    Notified Person: MD    Notified Person Name: Lenard Garsia      Notification Date/Time: 3/5/24 @ 0949    Notification Interaction: [employer] Messaging    Purpose of Notification: Do you want to keep the ornelas today?    Orders Received: Remove ornelas for voiding trial.    Comments:

## 2024-03-05 NOTE — PLAN OF CARE
Goal Outcome Evaluation:       PRIMARY Concern: Fall, increasing confusion.   SAFETY RISK Concerns (fall risk, behaviors, etc.): yes, fall risk      Isolation/Type: none  Tests/Procedures for NEXT shift: Neuro check q4  Consults? (Pending/following, signed-off?) Neurology following. Speech/SW consult pending. OT/PT, WOC consulted.  Where is patient from? (Home, TCU, etc.): ILF  Other Important info for NEXT shift: OT slums score 6/30  Anticipated DC date & active delays: Pending clinical improvement and TCU placement.   ___________________________________________  SUMMARY NOTE:                Orientation/Cognitive: Alert Oriented to self.  Observation Goals (Met/ Not Met): Inpatient  Mobility Level/Assist Equipment: AX2 gb/walker. T/R.   Antibiotics & Plan (IV/po, length of tx left): none  Pain Management: Denies pain this shift  Tele/VS/O2: VSS on RA. Tele SR  ABNL Lab/BG: CK- 1,158.  Diet: Reg  Bowel/Bladder: Guzman removed at 11 am ,voided once with PT  Skin Concerns: Skin tear bilat elbow and lower arms. Dressings c/d/i  Drains/Devices: PIV SL.  Patient Stated Goal for Today: voiding Potassium replacing ,Pt is reporting two rings are missing ..

## 2024-03-05 NOTE — PROGRESS NOTES
Notified provider about indwelling ornelas catheter discussed removal or continued need.    Did provider choose to remove indwelling ornelas catheter? Yes    Provider's ornelas indication for keeping indwelling ornelas catheter: Retention.    Is there an order for indwelling ornelas catheter? No     *If there is a plan to keep ornelas catheter in place at discharge daily notification with provider is not necessary, but please add a notation in the treatment team sticky note that the patient will be discharging with the catheter.

## 2024-03-05 NOTE — CONSULTS
Care Management Initial Consult    General Information  Assessment completed with: Family, Christina, niece and HCA  Type of CM/SW Visit: Initial Assessment    Primary Care Provider verified and updated as needed:     Readmission within the last 30 days:        Reason for Consult: discharge planning  Advance Care Planning: Advance Care Planning Reviewed: present on chart          Communication Assessment  Patient's communication style: spoken language (English or Bilingual)    Hearing Difficulty or Deaf: no   Wear Glasses or Blind: yes    Cognitive  Cognitive/Neuro/Behavioral: .WDL except, orientation  Level of Consciousness: alert, confused  Arousal Level: opens eyes spontaneously  Orientation: disoriented to, situation, place, time  Mood/Behavior: calm, cooperative  Best Language: 0 - No aphasia  Speech: illogical    Living Environment:   People in home: facility resident     Current living Arrangements: independent living facility      Able to return to prior arrangements: other (see comments)  Living Arrangement Comments: Family looking to add [facility] services post TCU, or transition to memory care    Family/Social Support:  Care provided by: self  Provides care for: no one  Marital Status:   Other (specify) (Niece, HCA)          Description of Support System: Supportive    Support Assessment: Adequate family and caregiver support    Current Resources:   Patient receiving home care services: No     Community Resources: None  Equipment currently used at home: walker, rolling  Supplies currently used at home: None    Employment/Financial:  Employment Status: retired        Financial Concerns:             Does the patient's insurance plan have a 3 day qualifying hospital stay waiver?  No    Lifestyle & Psychosocial Needs:  Social Determinants of Health     Food Insecurity: Not on file   Depression: Not at risk (1/15/2019)    PHQ-2     PHQ-2 Score: 0   Housing Stability: Not on file   Tobacco Use: Low Risk  (4/4/2023)     Patient History     Smoking Tobacco Use: Never     Smokeless Tobacco Use: Never     Passive Exposure: Not on file   Financial Resource Strain: Not on file   Alcohol Use: Not At Risk (4/3/2019)    AUDIT-C     Frequency of Alcohol Consumption: Never     Average Number of Drinks: Not on file     Frequency of Binge Drinking: Not on file   Transportation Needs: Not on file   Physical Activity: Not on file   Interpersonal Safety: Not on file   Stress: Not on file   Social Connections: Not on file       Functional Status:  Prior to admission patient needed assistance:   Dependent ADLs:: Ambulation-walker  Dependent IADLs:: Independent       Mental Health Status:          Chemical Dependency Status:                Values/Beliefs:  Spiritual, Cultural Beliefs, Synagogue Practices, Values that affect care:                 Additional Information:  Spoke with niece and HCA Christina for initial consult. Pt was admitted on 3/2/24 following a fall at home, per H&P. [facility]. Pt uses a walker for mobility at baseline. Discussed PT, OT, SLP recommendations of TCU at discharge. Christina in agreement, states pt has been to a TCU in the past after a hip surgery. Informed that pt may need a memory care TCU, informed of locations, Christina preferring [facility] as it is the best location near her in [place]. Christina would prefer pt go to [facility] TCU as a first option as this is a familiar setting for pt. Christina understands that discharge would be based on medical stability and when a facility has been found. She states no further questions. Referrals sent.     NANCY Lemus  Social Work  [facility]

## 2024-03-05 NOTE — PROGRESS NOTES
[facility]    Medicine Progress Note - Hospitalist Service    Date of Admission:  3/2/2024    Assessment & Plan   Lori Fall is a 89 year old female who was admitted on 3/2/2024 and has history significant for HTN, HLP, Osteopenia, History of PUD with GI bleed who was registered to short-stay/observation due to fall resulting in acute rhabdomyolysis.       Patient presented to the [facility] ED following a fall at home.  Patient could not provide much detail regarding the fall but believe it was mechanical in nature as she thinks she tripped.  She was unable to get up off the ground and remained on the ground for at least 4-6 hours.       Work-up in the ED included a CMP that revealed a total protein of 6.2 and glucose of 104 otherwise within normal limits.  CBC with diff was unremarkable.  UA had mucus present otherwise was negative.    POCT lactic acid level was within normal limits at 1.6.  POCT VBG with O2 sat of 69 otherwise within normal limits.  Troponin T elevated at 47.  Total CK elevated at 687.       Imaging studies completed in the ED included a head CT w/o contrast, cervical spine CT w/o contrast 2 view chest Xray and 1/2 views pelvic  Xray that were negative for acute findings.  These did reveal moderate-sized hiatal hernia, slightly increased density and widening of the left C3-C4 foramen (incidental), post-op bilateral VAISHALI.       As of 3/5, she is improving and more coherent and conversant - confirmed by niece and HCA Christina at bedside. We will have further eval with therapies and update from neurology today. Pt may be approaching readiness for discharge in the next day or so - likely to TCU it seems.      Suspected Hospital acquired delirium  Hallucinations  *Patient's niece was present in the room when assessed 3/3.  It was reported patient resided independently and has some slight memory issues (began ~ 6 months ago) but currently is far from her baseline.    *Discussion  with [place] LYDIA and nursing staff 3/3; patient appears to be hallucinating and responding to internal stimuli.    *3/3; patient believes she is at [employer].  Was very slow to answer questions regarding her name but was able to answer appropriately.  She knew it was March 2024 and that Karyn is the current president.  She was unable to state who her niece was.    -  Delirium prevention:              --Reorient patient at each interaction.  --Give patient window bed if possible.  --Keep room lights on and blinds open during day (8am-8pm), low lights 8pm-8am.  --Minimize interruptions of sleep at night (consolidate vitals, nursing assessments, medications).  -  PRN PO Seroquel available.     Fall (Suspect mechanical but patient doesn't entirely remember events)  Left arm skin tear with fall  Rhabdomyolysis - improving/resolving  *CK  trend: 687-->566-->687 --> 1,158 3/4 --> 343 3/5, stopped IVF, encourage adequate PO  - IVF   - PT consult requested.    - SW/CM consult requested.    - Wound consult for skin tear (not available over the weekends).      Troponin elevation  - Troponin trend:               --47-->50-->44.    -  ECHO EF 65-70%, normal LV systolic function, trace AR, difficult study  -  Monitor on telemetry.    - no anginal equivalents appreciated or reported     Acute hypotension (Resolved)  Elevated lactic acid level  Known HTN  *RRT called early morning 3/3 due to AMS and hypotension.  Briefly discussed with [place] LYDIA, Marta Barrera CNP.  Please see RRT note for full details.                 **Noted elevated lactic acid level of 4.2 and BP of 87/46.                 **Received IV fluid bolus 1000 ml NS.    -  Lactic acid level monitor:               --1.6-->4.2-->2.1  -  Resumed on PTA metoprolol 100 mg BID with hold parameters.    - 3/4 RESUME PTA amlodipine.   - 3/5 resume PTA lisinopril (hold if SBP < 110)  -  IVF stopped, encourage PO intake    Hypokalemia  K 3.2. Replace with oral supplement.  - follow  and replace  - BMP in AM     History of gastric ulcer with GI bleed 2017  -  No acute GI complaints.  No interventions.       Hyperlipidemia  -  Hold PTA simvastatin 40 mg at bedtime given rhabdomyolysis.       Physical deconditioning  -  PT consult requested.                Diet: Regular Diet Adult    DVT Prophylaxis: Pneumatic Compression Devices  Ornelas Catheter: PRESENT, indication: Acute retention or obstruction  Lines: None     Cardiac Monitoring: ACTIVE order. Indication: Syncope- high cardiac risk (48 hours)  Code Status: No CPR- Do NOT Intubate      Clinically Significant Risk Factors        # Hypokalemia: Lowest K = 3.2 mmol/L in last 2 days, will replace as needed       # Hypoalbuminemia: Lowest albumin = 3.3 g/dL at 3/3/2024  1:21 PM, will monitor as appropriate                       Disposition Plan      Expected Discharge Date: 03/06/2024        Discharge Comments: fall at home   EEG done  General neuro following.   ornelas placed for retention  PT  rec. TCU            Lenard Garsia MD  Hospitalist Service  [facility]  Securely message with [employer] (more info)  Text page via [employer] Paging/Directory   ______________________________________________________________________    Interval History   Pt seen and examined. Niece/HCA Christina at bedside and reports pt is drastically improved from earlier in admission. Pt reports doing well, no new pain, no new weakness, no sob fevers or chills. Discussed ongoing therapy eval and update from neurology today and possibly discharge in the next day or two.  They are agreeable to TCU if recommended. Discussed with pt and Christina that her CK level is normalizing nicely and no longer needs IVF.    Physical Exam   Vital Signs: Temp: 98.2  F (36.8  C) Temp src: Oral BP: (!) 149/72 Pulse: 75   Resp: 20 SpO2: 95 % O2 Device: None (Room air)    Weight: 124 lbs 1.9 oz    Gen: NAD, pleasant  HEENT: EOMI, MMM  Resp: no focal crackles,  no wheezes, no increased  work of resp  CV: S1S2 heard, reg rhythm, reg rate  Abdo: soft, nontender, nondistended, bowel sounds present  Ext: calves nontender, well perfused  Neuro: aa, conversant, CN grossly intact      Medical Decision Making       41 MINUTES SPENT BY ME on the date of service doing chart review, history, exam, documentation & further activities per the note.      Data     I have personally reviewed the following data over the past 24 hrs:    N/A  \   N/A   / N/A     138 105 11.7 /  91   3.2 (L) 21 (L) 0.55 \

## 2024-03-05 NOTE — PROGRESS NOTES
"  Speech-Language Pathology: clinical swallow evaluation     03/05/24 1000   Appointment Info   Signing Clinician's Name / Credentials (SLP) Destiny Dewitt MA CCC-SLP   General Information   Onset of Illness/Injury or Date of Surgery 03/02/24   Referring Physician Pk De La Cruz PA-C   Patient/Family Therapy Goal Statement (SLP) None stated.   Pertinent History of Current Problem per physician note \"Lori Fall is a 89 year old female who was admitted on 3/2/2024.      Past medical history significant for HTN, HLP, Osteopenia, History of PUD with GI bleed who was registered to short-stay/observation due to fall resulting in acute rhabdomyolysis.       Patient presented to the [facility] ED following a fall at home.  Patient could not provide much detail regarding the fall but believe it was mechanical in nature as she thinks she tripped.  She was unable to get up off the ground and remained on the ground for at least 4-6 hours.\" Hospital course complicated by delirium, halllucinatons, reduced alertness. Referred to SLP for clinical swallow evaluation. Daughter at bedside reports \"she couldn't find her mouth yesterday.\"   General Observations Patinet is alert, pleasant, decreasing confusion per daughter  at bedside. Reports no difficulty chewing or swallowing at baseline and good appetite.   Type of Evaluation   Type of Evaluation Swallow Evaluation   General Swallowing Observations   Respiratory Support room air   Current Diet/Method of Nutritional Intake (General Swallowing Observations, NIS) regular diet;thin liquids (level 0)   Swallowing Evaluation Clinical swallow evaluation   Clinical Swallow Evaluation   Clinical Swallow Evaluation Textures Trialed thin liquids;pureed;solid foods   Clinical Swallow Eval: Thin Liquid Texture Trial   Mode of Presentation, Thin Liquids self-fed;straw;cup   Volume of Liquid or Food Presented 8 oz   Oral Phase of Swallow WFL   Pharyngeal Phase of Swallow intact "   Diagnostic Statement timely swallow with no overt s/sx of aspiration   Clinical Swallow Evaluation: Puree Solid Texture Trial   Mode of Presentation, Puree spoon;self-fed   Volume of Puree Presented 4 oz   Oral Phase, Puree WFL   Pharyngeal Phase, Puree intact   Diagnostic Statement WFL, timely oral  transit and no s/sx of aspiration   Clinical Swallow Evaluation: Solid Food Texture Trial   Mode of Presentation self-fed   Volume Presented crackers   Oral Phase WFL   Pharyngeal Phase intact   Diagnostic Statement WFL, adequate mastication and oral clearance and no s/sx of aspiration or globus sensation   Esophageal Phase of Swallow   Patient reports or presents with symptoms of esophageal dysphagia No   Swallowing Recommendations   Diet Consistency Recommendations thin liquids (level 0);regular diet   Supervision Level for Intake distant supervision needed   Mode of Delivery Recommendations bolus size, small;slow rate of intake   Recommended Feeding/Eating Techniques (Swallow Eval) maintain upright sitting position for eating;set-up and prepare tray   Medication Administration Recommendations, Swallowing (SLP) as tolerated   Instrumental Assessment Recommendations instrumental evaluation not recommended at this time   Clinical Impression   Criteria for Skilled Therapeutic Interventions Met (SLP Eval) Evaluation only   Risks & Benefits of therapy have been explained evaluation/treatment results reviewed;care plan/treatment goals reviewed;risks/benefits reviewed;current/potential barriers reviewed;participants voiced agreement with care plan;participants included;patient;daughter   Clinical Impression Comments Clinical swallow evaluation completed per physician order and patient presents with WFL swallow function. Oral mechanism exam grossly unremarkable with some hoarse vocal quality with pitch phonation breaks noted. Patient tolerates thin liquids, pureed, and solids without difficulty. Daughter at bedside reports  significant improvement in alertness, decreasing confusion, improved appetite and ability to feed self. No further dysphagia tx warranted at this time.   SLP Total Evaluation Time   Eval: oral/pharyngeal swallow function, clinical swallow Minutes (07260) 15   SLP Goals   Therapy Frequency (SLP Eval) one time eval and treatment only   Interventions   Interventions Quick Adds Swallowing Dysfunction   Swallowing Dysfunction &/or Oral Function for Feeding   Treatment of Swallowing Dysfunction &/or Oral Function for Feeding Minutes (20212) 8   Symptoms Noted During/After Treatment None   Treatment Detail/Skilled Intervention Education provided regarding s/sx of dysphagia, diet recommendations and general aspiration precautions. Discussed recommendation for TCU with recommendation for cognitive-linguistic eval/tx at next level of care to maximize safety/independence for return to PLOF.   SLP Discharge Planning   SLP Plan d/c SLP services; recommend cognitive-linguistic eval/tx at next level of care   SLP Discharge Recommendation Transitional Care Facility   SLP Rationale for DC Rec Swallow function WFL.  Decreased cognitive-linguistic functioning impacts safety/independence to return home to PLOF.   SLP Brief overview of current status  Swallow function is WFL. Recommend regular diet and thin  liquids with medication as tolerated and general swallow precations.  SLP to s/o. Recommend ongoing SLP services at next level of care, as cognitive-linguistic impairment impacts safety/independence for return to PLOF.   Total Session Time   Total Session Time (sum of timed and untimed services) 23

## 2024-03-05 NOTE — PROGRESS NOTES
"Neurology Progress Note      Subjective    Patient feels \"OK, I'm not sure.\"  No specific complaints.        Oriented to self and location, knows month and year, remains confused though conversing coherently         Diagnostic Work-up Review:    TSH - 1.60  Vitamin B12 - 471  Ammonia - 12    EEG (3/4/2024)  Conclusion:  There is theta slowing throughout the recording.  This could be related to neurodegenerative state versus mild encephalopathy.  Clinical correlation is recommended.    Interim imaging reviewed      Interim lab results reviewed     03/05/24 06:14   Sodium 138   Potassium 3.2 (L)   Chloride 105   Carbon Dioxide (CO2) 21 (L)   Urea Nitrogen 11.7   Creatinine 0.55   GFR Estimate 84   Calcium 8.7   Anion Gap 12      Latest Reference Range & Units 03/04/24 06:35 03/05/24 06:14   CK Total 26 - 192 U/L 1,158 (HH) 343 (H)       Impression:  89 year old female presented 3/2/2023 with confusion after a fall (stated as mechanical).  There may be some pre-existing cognitive decline, but generally she functions well at baseline.  Dr. Dao ordered some vitamin levels that remain pending, but other labwork has been unrevealing.  EEG with only some mild generalized slowing.      She is clearly better than yesterday, still confused, but conversing more appropriately without as much perseveration.  In the absence of an alternative explanation, perhaps concussion from the head injury could be playing a role.  But the improvement is reassuring.      No further interventions anticipated from a neurology standpoint, will sign off, please page with ?s      Saúl Cerna MD  [facility] [phone]   "

## 2024-03-05 NOTE — PLAN OF CARE
PRIMARY Concern: Fall, increasing confusion.   SAFETY RISK Concerns (fall risk, behaviors, etc.): yes, fall risk      Isolation/Type: none  Tests/Procedures for NEXT shift: Neuro check q4  Consults? (Pending/following, signed-off?) Neurology following. Speech/SW signed off. OT/PT, WOC consulted.  Where is patient from? (Home, TCU, etc.): ILF  Other Important info for NEXT shift: OT slums score 6/30  Anticipated DC date & active delays: Pending clinical improvement and TCU placement.   ___________________________________________  SUMMARY NOTE:                Orientation/Cognitive: Alert Oriented to self.  Observation Goals (Met/ Not Met): Inpatient  Mobility Level/Assist Equipment: AX2 gb/walker. T/R.   Antibiotics & Plan (IV/po, length of tx left): none  Pain Management: Denies pain this shift  Tele/VS/O2: VSS on RA. Tele SR  ABNL Lab/BG: K 3.2,   Diet: Reg  Bowel/Bladder: Voiding trial; if no urine by 1700, bladder scan.  Skin Concerns: Skin tear bilat elbow and lower arms. Dressings c/d/i  Drains/Devices: IV SL  Patient Stated Goal for Today: none
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
## 2024-03-06 NOTE — TELEPHONE ENCOUNTER
[facility]    Who is the name of the provider?:  PRECIOUS ARTIS   What is the location you see this provider at/preferred location?: [place]  Person calling / Facility: Lori Fall  Phone number:  [phone] (home)  Nurse call back needed:  No     Reason for call:  Vidhi Vasquez called. Patient is in patient at [facility]. Christina was calling in response to a [employer] notification to schedule. She wanted to relay that patient has had many labs drawn while in the hospital/that she may not need more labs if Dr Artis is able to see the results. Also, patient will be going to transitional care and Christina will call if/when they decide she needs a follow up with Dr Artis.    Pharmacy location:     [id number] - [place], [place] - [address] AT [facility] & [address] [place] [address] [facility] #[id number] - [place], [place] - [address] AT [facility] & [facility]  Outside Imaging: n/a   Can we leave a detailed message on this number?  YES     3/6/2024, 11:31 AM

## 2024-03-06 NOTE — PLAN OF CARE
"Physical Therapy Discharge Summary    Reason for therapy discharge:    Discharged to transitional care facility.    Progress towards therapy goal(s). See goals on Care Plan in [facility] electronic health record for goal details.  Goals partially met.  Barriers to achieving goals:   discharge from facility.    Therapy recommendation(s):    Continued therapy is recommended.  Rationale/Recommendations:  per most recent PT note: \"Pt well below baseline mobility. Currently needing A x1-2 for all functional mobility. Anticipate when medically ready Pt will need TCU to improve upon functional mobility and strengthening.\" .      "

## 2024-03-06 NOTE — PLAN OF CARE
Occupational Therapy Discharge Summary    Reason for therapy discharge:    Discharged to transitional care facility.    Progress towards therapy goal(s). See goals on Care Plan in [facility] electronic health record for goal details.  Goals partially met.  Barriers to achieving goals:   discharge from facility.    Therapy recommendation(s):    Patient is presenting below baseline of independent. Limited by new / worsening cognitive deficits and weakness. Not requiring Ax1-2 for ADL and functional mobility and ADL. Recommend TCU to progress ADL independence. Anticipate pt will benefit from higher level of care long term such as penitentiary.

## 2024-03-06 NOTE — PROGRESS NOTES
Care Management Discharge Note    Discharge Date: 03/06/2024       Discharge Disposition: Transitional Care    Discharge Services: Transportation Services    Discharge DME:      Discharge Transportation: agency    Private pay costs discussed: transportation costs    Does the patient's insurance plan have a 3 day qualifying hospital stay waiver?  No    PAS Confirmation Code: [id number]  Patient/family educated on Medicare website which has current facility and service quality ratings: yes    Education Provided on the Discharge Plan:    Persons Notified of Discharge Plans: yes  Patient/Family in Agreement with the Plan: yes    Handoff Referral Completed: Yes    Additional Information:  Patient accepted at [facility] for today. SW spoke with pt's niece and she is in agreement. [facility] 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 Discharge orders signed and sent to the facility. PAS completed. PCS also completed and faxed.     PAS-RR    D: Per DHS regulation, SW completed and submitted PAS-RR to [place] [employer] Direct [employer] via the [employer].  PAS-RR confirmation # is : [id number]    I: SW spoke with patient and they are aware a PAS-RR has been submitted.  SW reviewed with patient that they may be contacted for a follow up appointment within 10 days of hospital discharge if their SNF stay is < 30 days.  Contact information for [employer] was also provided.    A: Patient verbalized understanding.    P: Further questions may be directed to [employer] at #[phone], option #4 for PAS-RR staff.      LUISANA Clayton

## 2024-03-06 NOTE — PLAN OF CARE
Goal Outcome Evaluation:      Plan of Care Reviewed With: patient    Overall Patient Progress: no change    PRIMARY Concern: Fall resulting in rhabdomyolysis, increasing confusion.   SAFETY RISK Concerns (fall risk, behaviors, etc.): fall risk      Isolation/Type:None  Tests/Procedures for NEXT shift:AM lab-BMP  Consults? (Pending/following, signed-off?) Neurology  signed off, WOC/PT/OT/speech/SW  following.   Where is patient from? (Home, TCU, etc.): [facility]  Anticipated DC date & active delays: TCU, SW sent referrals,Pending clinical improvement and placement.   ___________________________________________  SUMMARY NOTE:                Orientation/Cognitive: A&Ox1-2,disoriented to place ,situation and sometime time  Intermittent confusion.    Observation Goals (Met/ Not Met):N/A-Inpatient  Mobility Level/Assist Equipment: AX2 gb/walker. T/R.   Antibiotics & Plan (IV/po, length of tx left):N/A  Pain Management: Denies pain   Tele/VS/O2: VSS on RA.   ABNL Lab/BG: CK- trending down now 343, was 1,158  Diet: Reg  Bowel/Bladder: Incontinent B&B, urinary retention, unable to void, several failed attempts to straight cath, later Guzman was replaced.  Skin Concerns: Several skin tears-latoya elbow and lower arms, wounds ankle, spine, toes. WOC following, dressings all C/D/I  Drains/Devices: PIV SL, Ugzman inplace and draining  Patient Stated Goal for Today:sleep

## 2024-03-06 NOTE — PLAN OF CARE
PRIMARY Concern: Fall, increasing confusion.   SAFETY RISK Concerns (fall risk, behaviors, etc.): yes, fall risk      Isolation/Type: none  Tests/Procedures for NEXT shift: Neuro check q4. Continue to monitor bladder function.  Consults? (Pending/following, signed-off?) Neurology following. Speech/SW consult pending. OT/PT, WOC consulted.  Where is patient from? (Home, TCU, etc.): ILF  Anticipated DC date & active delays: Pending clinical improvement and TCU placement.   ___________________________________________  SUMMARY NOTE:                Orientation/Cognitive: A&Ox2-3. Disoriented to situation.  Intermittent confusion.  Needs frequent reminders on how to use call light.   Observation Goals (Met/ Not Met): Inpatient  Mobility Level/Assist Equipment: AX2 gb/walker. T/R.   Antibiotics & Plan (IV/po, length of tx left): none  Pain Management: Denies pain this shift  Tele/VS/O2: VSS on RA. Tele SR  ABNL Lab/BG: CK- 1,158.  Diet: Reg  Bowel/Bladder: Guzman removed at 11 am ,voided once with PT. Bladder scanned this evening for 575. St. Cath attempt x2.  Unsuccessful.  Flying squad called.   Skin Concerns: Skin tear bilat elbow and lower arms. WOC nurse saw pt. Today and did wound care. Dressings c/d/i  Drains/Devices: PIV SL.  Patient Stated Goal for Today: voiding   Replaced K+ today, recheck in morning.

## 2024-03-06 NOTE — DISCHARGE SUMMARY
[facility]  Hospitalist Discharge Summary      Date of Admission:  3/2/2024  Date of Discharge:  3/6/2024  Discharging Provider: Lenard Garsia MD  Discharge Service: Hospitalist Service    Discharge Diagnoses   Suspected Hospital acquired delirium - improving  Possible post concussive syndrome?  Hallucinations  Fall (Suspect mechanical but patient doesn't entirely remember events)  Left arm skin tear with fall  Rhabdomyolysis - improving/resolving  Urinary retention s/p ornelas placement  Troponin elevation, likely demand ischemia, possibly in relation to rhabdomyolysis  Acute hypotension (resolved)  Lactic acidosis - AGMA (resolved)  Hypertension  Hypokalemia  History of gastric ulcer with GI bleed 2017  Hyperlipidemia  Stage 1 pressure injury of spine and deep tissue injury of left lateral ankle  Physical deconditioning      Clinically Significant Risk Factors          Follow-ups Needed After Discharge   Follow-up Appointments     Follow Up and recommended labs and tests      TCU MD / PCP for hospitalization follow up            Unresulted Labs Ordered in the Past 30 Days of this Admission       Date and Time Order Name Status Description    3/3/2024  3:44 PM Vitamin B6 In process     3/3/2024  3:44 PM Vitamin E In process     3/3/2024  3:44 PM Methylmalonic Acid In process         These results will be followed up by neurology    Discharge Disposition   Discharged to short-term care facility  Condition at discharge: Stable    Hospital Course   Lori Fall is a 89 year old female who was admitted on 3/2/2024 and has history significant for HTN, HLP, Osteopenia, History of PUD with GI bleed who was registered to short-stay/observation due to fall resulting in acute rhabdomyolysis.       Patient presented to the [facility] ED following a fall at home.  Patient could not provide much detail regarding the fall but believe it was mechanical in nature as she thinks she tripped.  She was  unable to get up off the ground and remained on the ground for at least 4-6 hours.       Work-up in the ED included a CMP that revealed a total protein of 6.2 and glucose of 104 otherwise within normal limits.  CBC with diff was unremarkable.  UA had mucus present otherwise was negative.    POCT lactic acid level was within normal limits at 1.6.  POCT VBG with O2 sat of 69 otherwise within normal limits.  Troponin T elevated at 47.  Total CK elevated at 687.       Imaging studies completed in the ED included a head CT w/o contrast, cervical spine CT w/o contrast 2 view chest Xray and 1/2 views pelvic  Xray that were negative for acute findings.  These did reveal moderate-sized hiatal hernia, slightly increased density and widening of the left C3-C4 foramen (incidental), post-op bilateral VAISHALI.       As of 3/5 and 3/6, she is improving and more coherent and conversant - confirmed by niece and [facility] Christina at bedside. We will have further eval with therapies and update from neurology today. Pt may be approaching readiness for discharge in the next day or so - likely to TCU it seems.      Suspected Hospital acquired delirium - improving  Possible post concussive syndrome?  Hallucinations  *Patient's niece was present in the room when assessed 3/3.  It was reported patient resided independently and has some slight memory issues (began ~ 6 months ago) but currently is far from her baseline.    *Discussion with House LYDIA and nursing staff 3/3; patient appears to be hallucinating and responding to internal stimuli.    *3/3; patient believes she is at [employer].  Was very slow to answer questions regarding her name but was able to answer appropriately.  She knew it was March 2024 and that Karyn is the current president.  She was unable to state who her niece was.    -  Delirium prevention:              --Reorient patient at each interaction.  --Give patient window bed if possible.  --Keep room lights on and blinds open during day  (8am-8pm), low lights 8pm-8am.  --Minimize interruptions of sleep at night (consolidate vitals, nursing assessments, medications).  -  PRN PO Seroquel available.  - considerably improved 3/5 and 3/6. Conversant and oriented to self, place, president/year. Forgetful of situation at times. Mina Vasquez reports she is greatly improved on 3/5. Neurology has not found etiology of symptoms otherwise - EEG fairly unremarkable with some mild generalized slowing, Head CT unrevealing, B12 WNL, folate WNL, TSH WNL. Agree with neurology, could be indicative of post concussion.  Overall her improvement is reassuring and further therapy and evaluation at TCU and with PCP is recommended.     Fall (Suspect mechanical but patient doesn't entirely remember events)  Left arm skin tear with fall  Rhabdomyolysis - improving/resolving  *CK  trend: 687-->566-->687 --> 1,158 3/4 --> 343 3/5, stopped IVF, encourage adequate PO  - PT consulted and rec TCU, SW/CM consulted and appreciated - coordinated with patient and [facility] mina Vasquez  - Wound consult for skin tear (not available over the weekends).      Urinary retention  Ornelas placed. TOV attempted but failed. Straight cath attempted but ultimately required ornelas re-placement.   - ornelas continued at discharge  - TOV in 1-2 weeks, may need urology referral    Troponin elevation, likely demand ischemia, possibly in relation to rhabdomyolysis  - Troponin trend:               - 47-->50-->44.    -  ECHO EF 65-70%, normal LV systolic function, trace AR, difficult study  -  Monitor on telemetry.    - no anginal equivalents appreciated or reported     Acute hypotension (Resolved)  Lactic acidosis (Resolved)  Anion gap metabolic acidosis (Resolved)  Known HTN  *RRT called early morning 3/3 due to AMS and hypotension.  Briefly discussed with House LYDIA, Marta Barrera CNP.  Please see RRT note for full details.                 **Noted elevated lactic acid level of 4.2 and BP of 87/46.                  **Received IV fluid bolus 1000 ml NS.    -  Lactic acid level monitor:               --1.6-->4.2-->2.1  -  Resumed on PTA metoprolol 100 mg BID with hold parameters.    - 3/4 RESUME PTA amlodipine.   - 3/5 resume PTA lisinopril (hold if SBP < 110)  -  IVF stopped, encourage PO intake  - TCU MD / PCP follow up for ongoing management after discharge     Hypokalemia  K 3.2. Replace with oral supplement.  - followed and replaced  - BMP normalized prior to discharge     History of gastric ulcer with GI bleed 2017  -  No acute GI complaints.  No interventions.       Hyperlipidemia  -  Hold PTA simvastatin 40 mg at bedtime given rhabdomyolysis.      Stage 1 Pressure Injury of Spine and Deep Tissue Pressure Injury of Left Lateral Ankle   WOC RN following - management at their direction  - continue wound cares at discharge     Physical deconditioning  -  PT consult requested - discharge to TCU    Consultations This Hospital Stay   WOUND OSTOMY CONTINENCE NURSE  IP CONSULT  PHYSICAL THERAPY ADULT IP CONSULT  PHARMACY IP CONSULT  CARE MANAGEMENT / SOCIAL WORK IP CONSULT  NEUROLOGY IP CONSULT  OCCUPATIONAL THERAPY ADULT IP CONSULT  SPEECH LANGUAGE PATH ADULT IP CONSULT  WOUND OSTOMY CONTINENCE NURSE  IP CONSULT  PHYSICAL THERAPY ADULT IP CONSULT  OCCUPATIONAL THERAPY ADULT IP CONSULT    Code Status   No CPR- Do NOT Intubate    Time Spent on this Encounter   I, Lenard Garsia MD, personally saw the patient today and spent greater than 30 minutes discharging this patient.       Lenard Garsia MD  [facility] EXTENDED RECOVERY AND SHORT STAY  [address] [place] [id number]  Phone: [phone]  ______________________________________________________________________    Physical Exam   Vital Signs: Temp: 97.9  F (36.6  C) Temp src: Oral BP: 139/77 Pulse: 77   Resp: 18 SpO2: 95 % O2 Device: None (Room air)    Weight: 124 lbs 1.9 oz    Gen: NAD, pleasant  HEENT: EOMI, MMM  Resp: no focal crackles,   no wheezes, no increased work of resp  CV: S1S2 heard, reg rhythm, reg rate  Abdo: soft, nontender, nondistended, bowel sounds present  Ext: calves nontender, well perfused  Neuro: aa, conversant, oriented to self, place, year/president, CN grossly intact, no facial asymmetry         Primary Care Physician   Alcides Ornelas    Discharge Orders      General info for SNF    Length of Stay Estimate: Short Term Care: Estimated # of Days <30  Condition at Discharge: Improving  Level of care:skilled   Rehabilitation Potential: Good  Admission H&P remains valid and up-to-date: Yes  Recent Chemotherapy: N/A  Use Nursing Home Standing Orders: Yes     Mantoux instructions    Give two-step Mantoux (PPD) Per Facility Policy Yes     Follow Up and recommended labs and tests    TCU MD / PCP for hospitalization follow up     Reason for your hospital stay    Fall at home     Intake and output    Every shift     Daily weights    Call Provider for weight gain of more than 2 pounds per day or 5 pounds per week.     Ornelas catheter    To straight gravity drainage. Change catheter every 2 weeks and PRN for leaking or decreased urine output with signs of bladder distention. DO NOT change catheter without a specific Provider order IF diagnosis of benign prostatic hypertrophy (BPH), neurogenic bladder, or other urological conditions     Activity - Up with nursing assistance     Additional Discharge Instructions    Continue your medications as below. Follow up with TCU team and PCP after hospitalization. Continue therapies at TCU. You will discharge with a ornelas catheter in place -  a trial of voiding can be pursued in the next 1-2 weeks at TCU and if unsuccessful, urology consult may be warranted.     Physical Therapy Adult Consult    Evaluate and treat as clinically indicated.    Reason:  deconditioning, recent fall     Occupational Therapy Adult Consult    Evaluate and treat as clinically indicated.    Reason:  deconditioning,  recent fall, possible underlying cognitive impairment     Fall precautions     Diet    Follow this diet upon discharge: Orders Placed This Encounter      Regular Diet Adult       Significant Results and Procedures   Most Recent 3 CBC's:  Recent Labs   Lab Test 03/04/24  0635 03/03/24  1321 03/03/24  0435   WBC 7.7 7.9 7.8   HGB 12.4 12.0 12.5   MCV 90 91 91    222 230     Most Recent 3 BMP's:  Recent Labs   Lab Test 03/06/24  0551 03/05/24  0614 03/04/24  0635    138 141   POTASSIUM 4.2 3.2* 3.6   CHLORIDE 106 105 109*   CO2 23 21* 18*   BUN 9.9 11.7 13.5   CR 0.58 0.55 0.46*   ANIONGAP 9 12 14   PRAKASH 8.7 8.7 8.5   GLC 94 91 101*     7-Day Micro Results       Collected Updated Procedure Result Status      03/03/2024 1431 03/03/2024 1520 Symptomatic Influenza A/B, RSV, & SARS-CoV2 PCR (COVID-19) Nose [[id number]]    Swab from Nose    Final result Component Value   Influenza A PCR Negative   Influenza B PCR Negative   RSV PCR Negative   SARS CoV2 PCR Negative   NEGATIVE: SARS-CoV-2 (COVID-19) RNA not detected, presumed negative.                  Most Recent TSH and T4:  Recent Labs   Lab Test 03/03/24  2304 03/22/23  0904   TSH 1.60 2.59   T4  --  1.17     Most Recent Urinalysis:  Recent Labs   Lab Test 03/03/24  1359 03/02/24  0627 05/06/17  1512   COLOR Light Yellow   < > Yellow   APPEARANCE Clear   < > Clear   URINEGLC Negative   < > Negative   URINEBILI Negative   < > Negative   URINEKETONE Negative   < > Negative   SG 1.011   < > 1.010   UBLD Trace*   < > Negative   URINEPH 5.5   < > 7.0   PROTEIN Negative   < > Negative   UROBILINOGEN  --   --  0.2   NITRITE Negative   < > Negative   LEUKEST Negative   < > Negative   RBCU 1   < >  --    WBCU 2   < >  --     < > = values in this interval not displayed.     Most Recent Anemia Panel:  Recent Labs   Lab Test 03/04/24  0635 03/03/24  2304 05/03/17  1309 03/08/17  0752   WBC 7.7  --    < > 4.7   HGB 12.4  --    < > 13.2   HCT 37.6  --    < > 40.7   MCV  90  --    < > 94     --    < > 281   B12  --  471  --   --    FOLIC  --  6.7  --   --    EPOE  --   --   --  9    < > = values in this interval not displayed.   ,   Results for orders placed or performed during the hospital encounter of 03/02/24   Head CT w/o contrast    Narrative    EXAM: CT HEAD W/O CONTRAST  LOCATION: [facility]  DATE: 3/2/2024    INDICATION: ams fell from standing. Injury. Pain.  COMPARISON: None.  TECHNIQUE: Routine CT Head without IV contrast. Multiplanar reformats. Dose reduction techniques were used.    FINDINGS:  INTRACRANIAL CONTENTS: No intracranial hemorrhage, extraaxial collection, or mass effect.  No CT evidence of acute infarct. Mild presumed chronic small vessel ischemic changes. Moderate generalized volume loss. No hydrocephalus. Skull base vascular   calcifications. 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     VISUALIZED ORBITS/SINUSES/MASTOIDS: Prior bilateral cataract surgery. Visualized portions of the orbits are otherwise unremarkable. No paranasal sinus mucosal disease. No middle ear or mastoid effusion.    BONES/SOFT TISSUES: No acute abnormality.      Impression    IMPRESSION:  1.  No CT evidence for acute intracranial process.  2.  Brain atrophy and presumed chronic microvascular ischemic changes as above.   CT Cervical Spine w/o Contrast    Narrative    EXAM: CT CERVICAL SPINE W/O CONTRAST  LOCATION: [facility]  DATE: 3/2/2024    INDICATION: fall, ams. Injury. Pain.  COMPARISON: None.  TECHNIQUE: Routine CT Cervical Spine without IV contrast. Multiplanar reformats. Dose reduction techniques were used.    FINDINGS:  Mid cervical dextroconvex curvature without congenital segmentation anomaly. Negative for fracture or traumatic malalignment. No extraspinal abnormality.     Craniovertebral junction and C1-C2: Normal.    C2-C3: Normal disc height. No herniation. Normal facets. No spinal canal or neural  foraminal stenosis.     C3-C4: 2 mm degenerative anterolisthesis. Prominent left C4 nerve root sleeve which is nonspecific. Slight widening of the foramen. Nerve sheath tumor is a consideration. Tortuous left vertebral artery can sometimes produce a similar appearance. No   evidence for bony encroachment upon the neural foramina. Left facet DJD. Patent central canal. This could be confirmed with contrast-enhanced MRI as clinically needed.     C4-C5: 2 mm degenerative retrolisthesis. Uncinate spur with mild left foraminal stenosis. Patent central canal.     C5-C6: Uncinate spur with moderate left foraminal stenosis. Right foramen patent. Patent central canal.     C6-C7: Facet and uncinate degenerative changes with minimal left foraminal stenosis. Patent central canal.     C7-T1: Facet DJD with mild bilateral foraminal stenosis.       Impression    IMPRESSION:  1.  Negative for fracture or traumatic malalignment.    2.  Slight increased density and widening of the left C3-C4 foramen. Differential considerations as. Likely incidental in the setting of trauma.   XR Chest 2 Views    Narrative    EXAM: XR CHEST 2 VIEWS  LOCATION: [facility]  DATE: 03/02/2024    INDICATION: Fall.  COMPARISON: None.      Impression    IMPRESSION: No acute infiltrates. Moderate-sized hiatal hernia. No pneumothorax. No definite displaced rib fracture. Scoliosis.     XR Pelvis 1/2 Views    Narrative    EXAM: XR PELVIS 1/2 VIEWS  LOCATION: [facility]  DATE: 3/2/2024    INDICATION: Pain after fall  COMPARISON: None.      Impression    IMPRESSION: Postoperative changes bilateral total hip arthroplasty. Components appear well seated. Pelvis negative for fracture. Degenerative change lower lumbar spine with convex left lumbar curvature. Vascular calcifications. Diffuse demineralization.   XR Chest Port 1 View    Narrative    EXAM: XR CHEST PORT 1 VIEW  LOCATION: [facility]  HOSPITAL  DATE: 3/3/2024    INDICATION: tachypnea  COMPARISON: 2024      Impression    IMPRESSION: Small hiatal hernia. Cardiac silhouette within normal limits. Mildly tortuous aorta. No pneumothorax or pleural effusion. No focal consolidation. No acute osseous abnormality.   Echocardiogram Complete     Value    LVEF  65-70%    [facility]    [id number]  [id number]  [id number]  [id number]^IVELISSE^ELIA^LAURO     [facility]  Echocardiography Laboratory  [facility] [place] [address]     Name: NEREIDA TENORIO  MRN: [id number]  : 1929  Study Date: 2024 09:08 AM  Age: 89 yrs  Gender: Female  Patient Location: [facility]  Reason For Study: Syncope and Collapse  Ordering Physician: ELIA OVIEDO  Referring Physician: ELIA OVIEDO  Performed By: Norma Mason     BSA: 1.6 m2  Height: 62 in  Weight: 124 lb  HR: 89  BP: 144/82 mmHg  ______________________________________________________________________________  Procedure  Complete Echo Adult. Optison (NDC #1236-9291) given intravenously.  ______________________________________________________________________________  Interpretation Summary     The visual ejection fraction is 65-70%.  Left ventricular systolic function is normal.  There is trace aortic regurgitation.  The study was technically difficult.  ______________________________________________________________________________  Left Ventricle  The left ventricle is normal in size. There is mild concentric left  ventricular hypertrophy. Diastolic Doppler findings (E/E' ratio and/or other  parameters) suggest left ventricular filling pressures are indeterminate.  Grade I or early diastolic dysfunction. The visual ejection fraction is 65-  70%. Left ventricular systolic function is normal.     Right Ventricle  The right ventricle is normal in size and function.     Atria  Normal left atrial size. Right atrial size is normal. There is no color  Doppler evidence of an atrial shunt.      Mitral Valve  The mitral valve leaflets are mildly thickened. There is trace mitral  regurgitation.     Tricuspid Valve  There is mild (1+) tricuspid regurgitation. The right ventricular systolic  pressure is approximated at 26.7 mmHg plus the right atrial pressure.     Aortic Valve  The aortic valve is trileaflet. There is trace aortic regurgitation. No  hemodynamically significant valvular aortic stenosis.     Pulmonic Valve  There is no pulmonic valvular regurgitation.     Vessels  The aortic root is normal size. Normal size ascending aorta. IVC diameter <2.1  cm collapsing >50% with sniff suggests a normal RA pressure of 3 mmHg.     Pericardium  There is no pericardial effusion.     Rhythm  Sinus rhythm was noted.  ______________________________________________________________________________  MMode/2D Measurements & Calculations  IVSd: 0.95 cm     LVIDd: 3.8 cm  LVIDs: 2.5 cm  LVPWd: 0.94 cm  FS: 33.6 %  LV mass(C)d: 108.6 grams  LV mass(C)dI: 69.6 grams/m2  Ao root diam: 3.4 cm  LA dimension: 2.9 cm  asc Aorta Diam: 3.2 cm  LA/Ao: 0.85  LVOT diam: 1.8 cm  LVOT area: 2.5 cm2  Ao root diam index Ht(cm/m): 2.2  Ao root diam index BSA (cm/m2): 2.2  Asc Ao diam index BSA (cm/m2): 2.1  Asc Ao diam index Ht(cm/m): 2.0  RWT: 0.50  TAPSE: 2.8 cm     Doppler Measurements & Calculations  MV E max jeovany: 78.1 cm/sec  MV A max jeovany: 107.0 cm/sec  MV E/A: 0.73  MV dec time: 0.18 sec     Ao V2 max: 135.5 cm/sec  Ao max P.0 mmHg  Ao V2 mean: 92.7 cm/sec  Ao mean P.0 mmHg  Ao V2 VTI: 23.8 cm  DEVIN(I,D): 2.3 cm2  DEVIN(V,D): 2.3 cm2  LV V1 max P.9 mmHg  LV V1 max: 121.0 cm/sec  LV V1 VTI: 21.3 cm  SV(LVOT): 54.2 ml  SI(LVOT): 34.7 ml/m2  PA acc time: 0.13 sec  TR max jeovany: 258.2 cm/sec  TR max P.7 mmHg  AV Jeovany Ratio (DI): 0.89  DEVIN Index (cm2/m2): 1.5  E/E' av.3  Lateral E/e': 9.0  Medial E/e': 13.5  RV S Jeovany: 20.8 cm/sec     ______________________________________________________________________________  Report  approved by: Toribio Smith 03/03/2024 01:16 PM             Discharge Medications   Current Discharge Medication List        START taking these medications    Details   QUEtiapine (SEROQUEL) 25 MG tablet Take 0.5 tablets (12.5 mg) by mouth every 6 hours as needed (Agitation)    Associated Diagnoses: Fall, subsequent encounter           CONTINUE these medications which have NOT CHANGED    Details   amLODIPine (NORVASC) 10 MG tablet Take 1 tablet (10 mg) by mouth daily  Qty: 90 tablet, Refills: 3    Comments: Keep on file until pt calls for refills.  Associated Diagnoses: Essential hypertension with goal blood pressure less than 130/80      Calcium Carbonate-Vitamin D (CALCIUM + D) 600-200 MG-UNIT per tablet Take 2 tablets by mouth 2 times daily.      ezetimibe (ZETIA) 10 MG tablet Take 1 tablet (10 mg) by mouth At Bedtime  Qty: 90 tablet, Refills: 3    Comments: Keep on file until pt calls for refills.  Associated Diagnoses: Hyperlipidemia LDL goal <100      fenofibrate (TRIGLIDE/LOFIBRA) 160 MG tablet Take 1 tablet (160 mg) by mouth daily  Qty: 90 tablet, Refills: 3    Comments: Keep on file until pt calls for refills.  Associated Diagnoses: Hyperlipidemia LDL goal <100      lisinopril (ZESTRIL) 40 MG tablet Take 1 tablet (40 mg) by mouth daily  Qty: 90 tablet, Refills: 3    Comments: Keep on file until pt calls for refills.  Associated Diagnoses: Essential hypertension with goal blood pressure less than 130/80      metoprolol tartrate (LOPRESSOR) 100 MG tablet TAKE 1 TABLET(100 MG) BY MOUTH TWICE DAILY  Qty: 180 tablet, Refills: 3    Comments: Keep on file until pt calls for refills.  Associated Diagnoses: Essential hypertension with goal blood pressure less than 130/80      simvastatin (ZOCOR) 40 MG tablet Take 1 tablet (40 mg) by mouth At Bedtime  Qty: 90 tablet, Refills: 3    Comments: Keep on file until pt calls for refills.  Associated Diagnoses: Hyperlipidemia LDL goal <100           Allergies    Allergies   Allergen Reactions    Aminoglycosides      neosporin onintment - rash    Hctz Hives    Penicillins     Sulfa Antibiotics

## 2024-03-06 NOTE — PLAN OF CARE
Overall Patient Progress: no change     PRIMARY Concern: Fall resulting in rhabdomyolysis, increasing confusion.   SAFETY RISK Concerns (fall risk, behaviors, etc.): fall risk      Isolation/Type:None  Tests/Procedures for NEXT shift: None  Consults? (Pending/following, signed-off?) Neurology  signed off,   Where is patient from? (Home, TCU, etc.): [facility] [place]  Anticipated DC date & active delays: [facility] TCU  _________________  SUMMARY NOTE:                Orientation/Cognitive: A&Ox1-2,disoriented to place ,situation and sometime time  Intermittent confusion.    Observation Goals (Met/ Not Met):N/A-Inpatient  Mobility Level/Assist Equipment: AX2 gb/walker. T/R.   Antibiotics & Plan (IV/po, length of tx left):N/A  Pain Management: Denies pain   Tele/VS/O2: VSS on RA.   ABNL Lab/BG: CK- trending down now 343, was 1,158  Diet: Reg  Bowel/Bladder: Ornelas placed on overnight shift due to retention; incontinent otherwise.  Skin Concerns: Several skin tears-latoya elbow and lower arms, wounds ankle, spine, toes. WOC following, dressings all C/D/I  Drains/Devices: Ornelas inplace and draining  Patient Stated Goal for Today:sleep      Patient cleared to discharge by hospitalist. Clarified with hospitalist that the patient will be discharging with ornelas in place. IV removed. Tele removed. Patient ornelas bag emptied and catheter cares completed. All belongings gathered and either transported with patient or sent with niece. Report given to EMS and packet given. Patient left unit at 1438.

## 2024-03-06 NOTE — PROGRESS NOTES
Pt. Had large incontinence episode and Bladder scanned at 1830 for 575cc.  Straight cath attempted x2. Unsuccessful.  Flying squad called- coming up to attempt now.

## 2024-03-07 NOTE — LETTER
"    3/7/2024        RE: Lori Fall  [address] [facility]  [address] [address]        [facility] GERIATRICS    PRIMARY CARE PROVIDER AND CLINIC:  Alcides Guzman MD, [address] / [place] [address]  Chief Complaint   Patient presents with     [facility] Medical Record Number:  [id number]  Place of Service where encounter took place:  [facility] (TCU) [[id number]]    HPI:    Lori Fall  is a 89 year old  (6/22/1929), admitted to the above facility from  [facility]. Hospital stay 3/2/26 through 3/6/24..     [facility]  Hospitalist Discharge Summary    Date of Admission:  3/2/2024  Date of Discharge:  3/6/2024    Hospital course:  \" 89 year old female who was admitted on 3/2/2024 and has history significant for HTN, HLP, Osteopenia, History of PUD with GI bleed who was registered to short-stay/observation due to fall resulting in acute rhabdomyolysis.       Patient presented to the [facility] ED following a fall at home.  Patient could not provide much detail regarding the fall but believe it was mechanical in nature as she thinks she tripped.  She was unable to get up off the ground and remained on the ground for at least 4-6 hours.       Work-up in the ED included a CMP that revealed a total protein of 6.2 and glucose of 104 otherwise within normal limits.  CBC with diff was unremarkable.  UA had mucus present otherwise was negative.    POCT lactic acid level was within normal limits at 1.6.  POCT VBG with O2 sat of 69 otherwise within normal limits.  Troponin T elevated at 47.  Total CK elevated at 687.       Imaging studies completed in the ED included a head CT w/o contrast, cervical spine CT w/o contrast 2 view chest Xray and 1/2 views pelvic  Xray that were negative for acute findings.  These did reveal moderate-sized hiatal hernia, slightly increased density and widening of the " left C3-C4 foramen (incidental), post-op bilateral VAISHALI.       As of 3/5 and 3/6, she is improving and more coherent and conversant - confirmed by niece and HCA Christina at bedside. We will have further eval with therapies and update from neurology today. Pt may be approaching readiness for discharge in the next day or so - likely to TCU it seems.      Suspected Hospital acquired delirium - improving  Possible post concussive syndrome?  Hallucinations  *Patient's niece was present in the room when assessed 3/3.  It was reported patient resided independently and has some slight memory issues (began ~ 6 months ago) but currently is far from her baseline.    *Discussion with House LYDIA and nursing staff 3/3; patient appears to be hallucinating and responding to internal stimuli.    *3/3; patient believes she is at [employer].  Was very slow to answer questions regarding her name but was able to answer appropriately.  She knew it was March 2024 and that Karyn is the current president.  She was unable to state who her niece was.    -  Delirium prevention:              --Reorient patient at each interaction.  --Give patient window bed if possible.  --Keep room lights on and blinds open during day (8am-8pm), low lights 8pm-8am.  --Minimize interruptions of sleep at night (consolidate vitals, nursing assessments, medications).  -  PRN PO Seroquel available.  - considerably improved 3/5 and 3/6. Conversant and oriented to self, place, president/year. Forgetful of situation at times. Vidhi Christina reports she is greatly improved on 3/5. Neurology has not found etiology of symptoms otherwise - EEG fairly unremarkable with some mild generalized slowing, Head CT unrevealing, B12 WNL, folate WNL, TSH WNL. Agree with neurology, could be indicative of post concussion.  Overall her improvement is reassuring and further therapy and evaluation at TCU and with PCP is recommended.     Fall (Suspect mechanical but patient doesn't entirely remember  events)  Left arm skin tear with fall  Rhabdomyolysis - improving/resolving  *CK  trend: 687-->566-->687 --> 1,158 3/4 --> 343 3/5, stopped IVF, encourage adequate PO  - PT consulted and rec TCU, SW/CM consulted and appreciated - coordinated with patient and [facility] mina Vasquez  - Wound consult for skin tear (not available over the weekends).      Urinary retention  Ornelas placed. TOV attempted but failed. Straight cath attempted but ultimately required ornelas re-placement.   - ornelas continued at discharge  - TOV in 1-2 weeks, may need urology referral     Troponin elevation, likely demand ischemia, possibly in relation to rhabdomyolysis  - Troponin trend:               - 47-->50-->44.    -  ECHO EF 65-70%, normal LV systolic function, trace AR, difficult study  -  Monitor on telemetry.    - no anginal equivalents appreciated or reported     Acute hypotension (Resolved)  Lactic acidosis (Resolved)  Anion gap metabolic acidosis (Resolved)  Known HTN  *RRT called early morning 3/3 due to AMS and hypotension.  Briefly discussed with House LYDIA, Marta Barrera CNP.  Please see RRT note for full details.                 **Noted elevated lactic acid level of 4.2 and BP of 87/46.                 **Received IV fluid bolus 1000 ml NS.    -  Lactic acid level monitor:               --1.6-->4.2-->2.1  -  Resumed on PTA metoprolol 100 mg BID with hold parameters.    - 3/4 RESUME PTA amlodipine.   - 3/5 resume PTA lisinopril (hold if SBP < 110)  -  IVF stopped, encourage PO intake  - TCU MD / PCP follow up for ongoing management after discharge     Hypokalemia  K 3.2. Replace with oral supplement.  - followed and replaced  - BMP normalized prior to discharge     History of gastric ulcer with GI bleed 2017  -  No acute GI complaints.  No interventions.       Hyperlipidemia  -  Hold PTA simvastatin 40 mg at bedtime given rhabdomyolysis.       Stage 1 Pressure Injury of Spine and Deep Tissue Pressure Injury of Left Lateral Ankle   WOC RN  following - management at their direction  - continue wound cares at discharge     Physical deconditioning  -  PT consult requested - discharge to TCU    Overall stabilized and patient discharged to the above TCU in stable condition for rehab.    Today: Patient being seen for initial TCU visit.  Alert and oriented with some forgetfulness.  Slow speech.  Denies pain or discomfort.  She is dangled at the edge of the bed with PT.  Lives independently at baseline and wanting to discharge home.  She slept well last night.  Appetite fair.  Hemodynamically stable.  No acute concerns from staff.    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  FAMILY HISTORY: family history includes Cancer in her brother; Family History Negative in her brother; Heart Disease in her brother, father, and mother.  SOCIAL HISTORY:   reports that she has never smoked. She has never used smokeless tobacco. She reports that she does not drink alcohol and does not use drugs.  Patient's living condition: lives alone    Post Discharge Medication  "Reconciliation Status:   MED REC REQUIRED  Post Medication Reconciliation Status: discharge medications reconciled, continue medications without change     Current Outpatient Medications   Medication Sig     amLODIPine (NORVASC) 10 MG tablet Take 1 tablet (10 mg) by mouth daily     Calcium Carbonate-Vitamin D (CALCIUM + D) 600-200 MG-UNIT per tablet Take 2 tablets by mouth 2 times daily.     ezetimibe (ZETIA) 10 MG tablet Take 1 tablet (10 mg) by mouth At Bedtime     fenofibrate (TRIGLIDE/LOFIBRA) 160 MG tablet Take 1 tablet (160 mg) by mouth daily     lisinopril (ZESTRIL) 40 MG tablet Take 1 tablet (40 mg) by mouth daily     magnesium hydroxide (MILK OF MAGNESIA) 400 MG/5ML suspension Take 30 mLs by mouth daily as needed for constipation or heartburn     metoprolol tartrate (LOPRESSOR) 100 MG tablet TAKE 1 TABLET(100 MG) BY MOUTH TWICE DAILY     QUEtiapine (SEROQUEL) 25 MG tablet Take 0.5 tablets (12.5 mg) by mouth every 6 hours as needed (Agitation)     simvastatin (ZOCOR) 40 MG tablet Take 1 tablet (40 mg) by mouth At Bedtime     No current facility-administered medications for this visit.       ROS:  10 point ROS of systems including Constitutional, Eyes, Respiratory, Cardiovascular, Gastroenterology, Genitourinary, Integumentary, Musculoskeletal, Psychiatric were all negative except for pertinent positives noted in my HPI.    Vitals:  /68   Pulse 79   Temp 97.2  F (36.2  C)   Resp 18   Ht 1.575 m (5' 2\")   Wt 55.8 kg (123 lb)   LMP  (LMP Unknown)   SpO2 96%   BMI 22.50 kg/m      Exam:  GENERAL APPEARANCE: NAD, alert and awake   HEENT-EARS: No discharge/drainage, [place]  HEENT-NECK: No lymphadenopathy  HEENT-EYES: PERRLA positive, no drainage/discharge  HEENT-NOSE/MOUTH/THROAT: No nasal drainage or erythema, mucous membranes moist  RESPIRATORY: Clear to auscultation, even and unlabored, symmetrical chest wall expansion  CARDIOVASCULAR: RRR, no peripheral edema, S1/S2 normal  GASTROINTESTINAL: Soft, " nontender, nondistended, bowel sounds present x4 quadrants  GENITOURINARY: Ornelas in place and patent   MUSCULOSKELETAL: In bed   INTEGUMENTARY: As below  NEUROLOGICAL: Alert and oriented, forgetful  PSYCHOLOGICAL: Calm      Lab/Diagnostic data:  Recent labs in [facility] reviewed by me today.     ASSESSMENT/PLAN:  Fall  Rhabdomyolysis, improved  Generalized weakness  Deconditioning  Frailty  -Fell at home and was on the ground for at least 4-6 hours per chart review, independent at baseline, UA was remarkable inpatient, troponin 47 and total  on presentation (343 on 3/5) to the hospital, imaging showed moderate-sized hiatal hernia, slightly increased density and widening of the left C3-C4 foramen (incidental), post-op bilateral VAISHALI, she is alert and oriented with some forgetfulness today  -Continue therapies, fall precautions, safe disposition    Suspected Hospital acquired delirium, improved  ?post concussive syndrome, improved  Hallucinations, resolved  -Recent decline in memory per chart review, slow speech today, alert and oriented with some forgetfulness, has PRN Seroquel available, B12 folate and TSH normal in the hospital  -Continue current care, monitor mental status    Hypertension  Hypotension, resolved  -SBP 120s-130s and pulse 70s to 80s in TCU  -Continue PTA amlodipine, lisinopril, and metoprolol (consider GDR), monitor BP and adjust medications, avoid hypotension, follow BMP    Hyperlipidemia  -Continue PTA simvastatin, ezetimibe and fenofibrate    Osteopenia  -Continue calcium supplement    History of PUD with GI bleed  -On no medications, outpatient follow-up    Stage 1 Pressure Injury of Spine  Deep Tissue Pressure Injury of Left Lateral Ankle    -Stable, continue current wound cares, consider wound team follow-up if fail to improve          Orders:  Discontinue ornelas   BMP           Electronically signed by:  Anna Edwards,DNP,APRN,AGNP-BC.                 The above note was completed in part using  J[employer] voice recognition software. Although reviewed after completion, some word and grammatical errors may occur. Please contact the author of this note with any questions.                     Sincerely,        BRITTANI Cardenas CNP

## 2024-03-07 NOTE — PROGRESS NOTES
"[facility] GERIATRICS    PRIMARY CARE PROVIDER AND CLINIC:  Alcides Guzman MD, [address] / [place] [address]  Chief Complaint   Patient presents with    [facility] Medical Record Number:  [id number]  Place of Service where encounter took place:  [facility] (TCU) [[id number]]    HPI:    Lori Fall  is a 89 year old  (6/22/1929), admitted to the above facility from  [facility]. Hospital stay 3/2/26 through 3/6/24..     [facility]  Hospitalist Discharge Summary    Date of Admission:  3/2/2024  Date of Discharge:  3/6/2024    Hospital course:  \" 89 year old female who was admitted on 3/2/2024 and has history significant for HTN, HLP, Osteopenia, History of PUD with GI bleed who was registered to short-stay/observation due to fall resulting in acute rhabdomyolysis.       Patient presented to the [facility] ED following a fall at home.  Patient could not provide much detail regarding the fall but believe it was mechanical in nature as she thinks she tripped.  She was unable to get up off the ground and remained on the ground for at least 4-6 hours.       Work-up in the ED included a CMP that revealed a total protein of 6.2 and glucose of 104 otherwise within normal limits.  CBC with diff was unremarkable.  UA had mucus present otherwise was negative.    POCT lactic acid level was within normal limits at 1.6.  POCT VBG with O2 sat of 69 otherwise within normal limits.  Troponin T elevated at 47.  Total CK elevated at 687.       Imaging studies completed in the ED included a head CT w/o contrast, cervical spine CT w/o contrast 2 view chest Xray and 1/2 views pelvic  Xray that were negative for acute findings.  These did reveal moderate-sized hiatal hernia, slightly increased density and widening of the left C3-C4 foramen (incidental), post-op bilateral VAISHALI.       As of 3/5 and 3/6, she is improving and more " coherent and conversant - confirmed by niconnie and DAVID Vasquez at bedside. We will have further eval with therapies and update from neurology today. Pt may be approaching readiness for discharge in the next day or so - likely to TCU it seems.      Suspected Hospital acquired delirium - improving  Possible post concussive syndrome?  Hallucinations  *Patient's niece was present in the room when assessed 3/3.  It was reported patient resided independently and has some slight memory issues (began ~ 6 months ago) but currently is far from her baseline.    *Discussion with House LYDIA and nursing staff 3/3; patient appears to be hallucinating and responding to internal stimuli.    *3/3; patient believes she is at [employer].  Was very slow to answer questions regarding her name but was able to answer appropriately.  She knew it was March 2024 and that Karyn is the current president.  She was unable to state who her niece was.    -  Delirium prevention:              --Reorient patient at each interaction.  --Give patient window bed if possible.  --Keep room lights on and blinds open during day (8am-8pm), low lights 8pm-8am.  --Minimize interruptions of sleep at night (consolidate vitals, nursing assessments, medications).  -  PRN PO Seroquel available.  - considerably improved 3/5 and 3/6. Conversant and oriented to self, place, president/year. Forgetful of situation at times. Vidhi Christina reports she is greatly improved on 3/5. Neurology has not found etiology of symptoms otherwise - EEG fairly unremarkable with some mild generalized slowing, Head CT unrevealing, B12 WNL, folate WNL, TSH WNL. Agree with neurology, could be indicative of post concussion.  Overall her improvement is reassuring and further therapy and evaluation at TCU and with PCP is recommended.     Fall (Suspect mechanical but patient doesn't entirely remember events)  Left arm skin tear with fall  Rhabdomyolysis - improving/resolving  *CK  trend: 687-->566-->687 -->  1,158 3/4 --> 343 3/5, stopped IVF, encourage adequate PO  - PT consulted and rec TCU, SW/CM consulted and appreciated - coordinated with patient and [facility] mina Vasquez  - Wound consult for skin tear (not available over the weekends).      Urinary retention  Ornelas placed. TOV attempted but failed. Straight cath attempted but ultimately required ornelas re-placement.   - ornelas continued at discharge  - TOV in 1-2 weeks, may need urology referral     Troponin elevation, likely demand ischemia, possibly in relation to rhabdomyolysis  - Troponin trend:               - 47-->50-->44.    -  ECHO EF 65-70%, normal LV systolic function, trace AR, difficult study  -  Monitor on telemetry.    - no anginal equivalents appreciated or reported     Acute hypotension (Resolved)  Lactic acidosis (Resolved)  Anion gap metabolic acidosis (Resolved)  Known HTN  *RRT called early morning 3/3 due to AMS and hypotension.  Briefly discussed with House LYDIA, Marta Barrera CNP.  Please see RRT note for full details.                 **Noted elevated lactic acid level of 4.2 and BP of 87/46.                 **Received IV fluid bolus 1000 ml NS.    -  Lactic acid level monitor:               --1.6-->4.2-->2.1  -  Resumed on PTA metoprolol 100 mg BID with hold parameters.    - 3/4 RESUME PTA amlodipine.   - 3/5 resume PTA lisinopril (hold if SBP < 110)  -  IVF stopped, encourage PO intake  - TCU MD / PCP follow up for ongoing management after discharge     Hypokalemia  K 3.2. Replace with oral supplement.  - followed and replaced  - BMP normalized prior to discharge     History of gastric ulcer with GI bleed 2017  -  No acute GI complaints.  No interventions.       Hyperlipidemia  -  Hold PTA simvastatin 40 mg at bedtime given rhabdomyolysis.       Stage 1 Pressure Injury of Spine and Deep Tissue Pressure Injury of Left Lateral Ankle   WOC RN following - management at their direction  - continue wound cares at discharge     Physical  "deconditioning  -  PT consult requested - discharge to TCU\".     Overall stabilized and patient discharged to the above TCU in stable condition for rehab.    Today: Patient being seen for initial TCU visit.  Alert and oriented with some forgetfulness.  Slow speech.  Denies pain or discomfort.  She is dangled at the edge of the bed with PT.  Lives independently at baseline and wanting to discharge home.  She slept well last night.  Appetite fair.  Hemodynamically stable.  No acute concerns from staff.    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  FAMILY HISTORY: family history includes Cancer in her brother; Family History Negative in her brother; Heart Disease in her brother, father, and mother.  SOCIAL HISTORY:   reports that she has never smoked. She has never used smokeless tobacco. She reports that she does not drink alcohol and does not use drugs.  Patient's living condition: lives alone    Post Discharge Medication Reconciliation Status:   MED REC REQUIRED  Post Medication Reconciliation Status: discharge medications " "reconciled, continue medications without change     Current Outpatient Medications   Medication Sig    amLODIPine (NORVASC) 10 MG tablet Take 1 tablet (10 mg) by mouth daily    Calcium Carbonate-Vitamin D (CALCIUM + D) 600-200 MG-UNIT per tablet Take 2 tablets by mouth 2 times daily.    ezetimibe (ZETIA) 10 MG tablet Take 1 tablet (10 mg) by mouth At Bedtime    fenofibrate (TRIGLIDE/LOFIBRA) 160 MG tablet Take 1 tablet (160 mg) by mouth daily    lisinopril (ZESTRIL) 40 MG tablet Take 1 tablet (40 mg) by mouth daily    magnesium hydroxide (MILK OF MAGNESIA) 400 MG/5ML suspension Take 30 mLs by mouth daily as needed for constipation or heartburn    metoprolol tartrate (LOPRESSOR) 100 MG tablet TAKE 1 TABLET(100 MG) BY MOUTH TWICE DAILY    QUEtiapine (SEROQUEL) 25 MG tablet Take 0.5 tablets (12.5 mg) by mouth every 6 hours as needed (Agitation)    simvastatin (ZOCOR) 40 MG tablet Take 1 tablet (40 mg) by mouth At Bedtime     No current facility-administered medications for this visit.       ROS:  10 point ROS of systems including Constitutional, Eyes, Respiratory, Cardiovascular, Gastroenterology, Genitourinary, Integumentary, Musculoskeletal, Psychiatric were all negative except for pertinent positives noted in my HPI.    Vitals:  /68   Pulse 79   Temp 97.2  F (36.2  C)   Resp 18   Ht 1.575 m (5' 2\")   Wt 55.8 kg (123 lb)   LMP  (LMP Unknown)   SpO2 96%   BMI 22.50 kg/m      Exam:  GENERAL APPEARANCE: NAD, alert and awake   HEENT-EARS: No discharge/drainage, [place]  HEENT-NECK: No lymphadenopathy  HEENT-EYES: PERRLA positive, no drainage/discharge  HEENT-NOSE/MOUTH/THROAT: No nasal drainage or erythema, mucous membranes moist  RESPIRATORY: Clear to auscultation, even and unlabored, symmetrical chest wall expansion  CARDIOVASCULAR: RRR, no peripheral edema, S1/S2 normal  GASTROINTESTINAL: Soft, nontender, nondistended, bowel sounds present x4 quadrants  GENITOURINARY: Guzman in place and patent "   MUSCULOSKELETAL: In bed   INTEGUMENTARY: As below  NEUROLOGICAL: Alert and oriented, forgetful  PSYCHOLOGICAL: Calm      Lab/Diagnostic data:  Recent labs in [facility] reviewed by me today.     ASSESSMENT/PLAN:  Fall  Rhabdomyolysis, improved  Generalized weakness  Deconditioning  Frailty  -Fell at home and was on the ground for at least 4-6 hours per chart review, independent at baseline, UA was unremarkable inpatient, troponin 47 and total  on presentation (343 on 3/5) to the hospital, imaging showed moderate-sized hiatal hernia, slightly increased density and widening of the left C3-C4 foramen (incidental), post-op bilateral VAISHALI, she is alert and oriented with some forgetfulness today  -Continue therapies, fall precautions, safe disposition    Suspected Hospital acquired delirium, improved  ?post concussive syndrome, improved  Hallucinations, resolved  -Recent decline in memory per chart review, slow speech today, alert and oriented with some forgetfulness, has PRN Seroquel available, B12 folate and TSH normal in the hospital  -Continue current care, monitor mental status    Hypertension  Hypotension, resolved  -SBP 120s-130s and pulse 70s to 80s in TCU  -Continue PTA amlodipine, lisinopril, and metoprolol (consider GDR), monitor BP and adjust medications, avoid hypotension, follow BMP    Hyperlipidemia  -Continue PTA simvastatin, ezetimibe and fenofibrate    Osteopenia  -Continue calcium supplement    History of PUD with GI bleed  -On no medications, outpatient follow-up    Stage 1 Pressure Injury of Spine  Deep Tissue Pressure Injury of Left Lateral Ankle    -Stable, continue current wound cares, consider wound team follow-up if fail to improve    Urinary tension  -Failed TOV in the hospital  -Discontinue Ornelas, PVR, reinsert Ornelas and urology referral if fails TOV            Orders:  Discontinue ornelas   BMP           Electronically signed by:  Anna Edwards,DNP,APRN,AGNP-BC.                 The above note was  completed in part using Dragon voice recognition software. Although reviewed after completion, some word and grammatical errors may occur. Please contact the author of this note with any questions.

## 2024-03-11 NOTE — LETTER
"    3/11/2024        RE: Lori Fall  [address]  [facility] [address]        [facility] GERIATRICS    Chief Complaint   Patient presents with     RECHECK     HPI:  Lori Fall is a 89 year old  (6/22/1929), who is being seen today for an episodic care visit at: [facility] (U) [[id number]]. Today's concern is: TCU Follow Up     Patient admitted to the above facility from  [facility]. Hospital stay 3/2/26 through 3/6/24..      [facility]  Hospitalist Discharge Summary    Date of Admission:  3/2/2024  Date of Discharge:  3/6/2024     Hospital course:  \" 89 year old female who was admitted on 3/2/2024 and has history significant for HTN, HLP, Osteopenia, History of PUD with GI bleed who was registered to short-stay/observation due to fall resulting in acute rhabdomyolysis.       Patient presented to the [facility] ED following a fall at home.  Patient could not provide much detail regarding the fall but believe it was mechanical in nature as she thinks she tripped.  She was unable to get up off the ground and remained on the ground for at least 4-6 hours.       Work-up in the ED included a CMP that revealed a total protein of 6.2 and glucose of 104 otherwise within normal limits.  CBC with diff was unremarkable.  UA had mucus present otherwise was negative.    POCT lactic acid level was within normal limits at 1.6.  POCT VBG with O2 sat of 69 otherwise within normal limits.  Troponin T elevated at 47.  Total CK elevated at 687.       Imaging studies completed in the ED included a head CT w/o contrast, cervical spine CT w/o contrast 2 view chest Xray and 1/2 views pelvic  Xray that were negative for acute findings.  These did reveal moderate-sized hiatal hernia, slightly increased density and widening of the left C3-C4 foramen (incidental), post-op bilateral VAISHALI.       As of 3/5 and 3/6, she is improving and more coherent and " conversant - confirmed by niconnie and [facility] at bedside. We will have further eval with therapies and update from neurology today. Pt may be approaching readiness for discharge in the next day or so - likely to TCU it seems.      Suspected Hospital acquired delirium - improving  Possible post concussive syndrome?  Hallucinations  *Patient's niece was present in the room when assessed 3/3.  It was reported patient resided independently and has some slight memory issues (began ~ 6 months ago) but currently is far from her baseline.    *Discussion with House LYDIA and nursing staff 3/3; patient appears to be hallucinating and responding to internal stimuli.    *3/3; patient believes she is at [facility].  Was very slow to answer questions regarding her name but was able to answer appropriately.  She knew it was March 2024 and that Karyn is the current president.  She was unable to state who her niece was.    -  Delirium prevention:              --Reorient patient at each interaction.  --Give patient window bed if possible.  --Keep room lights on and blinds open during day (8am-8pm), low lights 8pm-8am.  --Minimize interruptions of sleep at night (consolidate vitals, nursing assessments, medications).  -  PRN PO Seroquel available.  - considerably improved 3/5 and 3/6. Conversant and oriented to self, place, president/year. Forgetful of situation at times. Vidhi Christina reports she is greatly improved on 3/5. Neurology has not found etiology of symptoms otherwise - EEG fairly unremarkable with some mild generalized slowing, Head CT unrevealing, B12 WNL, folate WNL, TSH WNL. Agree with neurology, could be indicative of post concussion.  Overall her improvement is reassuring and further therapy and evaluation at TCU and with PCP is recommended.     Fall (Suspect mechanical but patient doesn't entirely remember events)  Left arm skin tear with fall  Rhabdomyolysis - improving/resolving  *CK  trend: 687-->566-->687 --> 1,158 3/4 -->  343 3/5, stopped IVF, encourage adequate PO  - PT consulted and rec TCU, SW/CM consulted and appreciated - coordinated with patient and [facility] mina Vasquez  - Wound consult for skin tear (not available over the weekends).      Urinary retention  Ornelas placed. TOV attempted but failed. Straight cath attempted but ultimately required ornelas re-placement.   - ornelas continued at discharge  - TOV in 1-2 weeks, may need urology referral     Troponin elevation, likely demand ischemia, possibly in relation to rhabdomyolysis  - Troponin trend:               - 47-->50-->44.    -  ECHO EF 65-70%, normal LV systolic function, trace AR, difficult study  -  Monitor on telemetry.    - no anginal equivalents appreciated or reported     Acute hypotension (Resolved)  Lactic acidosis (Resolved)  Anion gap metabolic acidosis (Resolved)  Known HTN  *RRT called early morning 3/3 due to AMS and hypotension.  Briefly discussed with House LYDIA, Marta Barrera CNP.  Please see RRT note for full details.                 **Noted elevated lactic acid level of 4.2 and BP of 87/46.                 **Received IV fluid bolus 1000 ml NS.    -  Lactic acid level monitor:               --1.6-->4.2-->2.1  -  Resumed on PTA metoprolol 100 mg BID with hold parameters.    - 3/4 RESUME PTA amlodipine.   - 3/5 resume PTA lisinopril (hold if SBP < 110)  -  IVF stopped, encourage PO intake  - TCU MD / PCP follow up for ongoing management after discharge     Hypokalemia  K 3.2. Replace with oral supplement.  - followed and replaced  - BMP normalized prior to discharge     History of gastric ulcer with GI bleed 2017  -  No acute GI complaints.  No interventions.       Hyperlipidemia  -  Hold PTA simvastatin 40 mg at bedtime given rhabdomyolysis.       Stage 1 Pressure Injury of Spine and Deep Tissue Pressure Injury of Left Lateral Ankle   WOC RN following - management at their direction  - continue wound cares at discharge     Physical deconditioning  -  PT consult  "requested - discharge to TCU\"     Overall stabilized and patient discharged to the above TCU in stable condition for rehab.    Today: Patient being seen for TCU follow-up visit.  Niece at the bedside.  Patient does report she is doing fine.  Niece reports patient was somewhat weaker yesterday but she is better today.  UA/UC pending.  Patient denies  symptoms whatsoever.  She still has Guzman catheter in place even though order was given on 3/7 to disconue.  Discussed with the nurse to discontinue Guzman now.  Patient reports she is ambulating short distances therapy.  No falls in TCU.  Appetite not so great.  Followed by RD and on dietary supplements.  Disposition pending at this time.  Staff without acute concerns.    Allergies, and PMH/PSH reviewed in EPIC today.  REVIEW OF SYSTEMS:  4 point ROS including Respiratory, CV, GI and , other than that noted in the HPI,  is negative    Objective:   /65   Pulse 78   Temp 97.8  F (36.6  C)   Resp 16   Ht 1.524 m (5')   Wt 55.4 kg (122 lb 1.6 oz)   LMP  (LMP Unknown)   SpO2 95%   BMI 23.85 kg/m      Exam:  GENERAL APPEARANCE: In no acute distress  RESPIRATORY: Clear to auscultation, even and unlabored, symmetrical chest wall expansion  CARDIOVASCULAR: RRR, no peripheral edema, S1/S2 normal  GASTROINTESTINAL: Soft, nontender, nondistended, bowel sounds present x4 quadrants  NEUROLOGICAL: Alert and oriented, forgetful  PSYCHOLOGICAL: Calm      Recent labs in EPIC reviewed by me today.     Assessment/Plan:  Fall  Rhabdomyolysis, improved  Generalized weakness  Deconditioning  Frailty  -Fell at home and was on the ground for at least 4-6 hours per chart review, independent at baseline, UA was unremarkable inpatient, troponin 47 and total  on presentation (343 on 3/5) to the hospital, imaging showed moderate-sized hiatal hernia, slightly increased density and widening of the left C3-C4 foramen (incidental), post-op bilateral VAISHALI, she is alert and oriented " with some forgetfulness in TCU  -Continue therapies, fall precautions, safe disposition     Suspected Hospital acquired delirium, resolved   ?post concussive syndrome, improved  Hallucinations, resolved  -Recent decline in memory per chart review, slow speech, alert and oriented with some forgetfulness, has PRN Seroquel available (not using), mood appropriate,  B12 folate and TSH normal in the hospital  -Continue current care, monitor mental status    Urinary tension  -Discontinue Guzman and PVR,  reinsert Guzman and urology referral if fails TOV         MED REC REQUIRED  Post Medication Reconciliation Status: medication reconcilation previously completed during another office visit            Electronically signed by:   Anna Edwards DNP,BRITTANI,TRISTENP-BC.             The above note was completed in part using Dragon voice recognition software. Although reviewed after completion, some word and grammatical errors may occur. Please contact the author of this note with any questions.           Sincerely,        BRITTANI Cardenas CNP

## 2024-03-11 NOTE — PROGRESS NOTES
"[facility] GERIATRICS    Chief Complaint   Patient presents with    RECHECK     HPI:  Lori Fall is a 89 year old  (6/22/1929), who is being seen today for an episodic care visit at: [facility] (U) [[id number]]. Today's concern is: TCU Follow Up     Patient admitted to the above facility from  [facility]. Hospital stay 3/2/26 through 3/6/24..      [facility]  Hospitalist Discharge Summary    Date of Admission:  3/2/2024  Date of Discharge:  3/6/2024     Hospital course:  \" 89 year old female who was admitted on 3/2/2024 and has history significant for HTN, HLP, Osteopenia, History of PUD with GI bleed who was registered to short-stay/observation due to fall resulting in acute rhabdomyolysis.       Patient presented to the [facility] ED following a fall at home.  Patient could not provide much detail regarding the fall but believe it was mechanical in nature as she thinks she tripped.  She was unable to get up off the ground and remained on the ground for at least 4-6 hours.       Work-up in the ED included a CMP that revealed a total protein of 6.2 and glucose of 104 otherwise within normal limits.  CBC with diff was unremarkable.  UA had mucus present otherwise was negative.    POCT lactic acid level was within normal limits at 1.6.  POCT VBG with O2 sat of 69 otherwise within normal limits.  Troponin T elevated at 47.  Total CK elevated at 687.       Imaging studies completed in the ED included a head CT w/o contrast, cervical spine CT w/o contrast 2 view chest Xray and 1/2 views pelvic  Xray that were negative for acute findings.  These did reveal moderate-sized hiatal hernia, slightly increased density and widening of the left C3-C4 foramen (incidental), post-op bilateral VAISHALI.       As of 3/5 and 3/6, she is improving and more coherent and conversant - confirmed by niece and [facility] at bedside. We will have further eval with therapies " and update from neurology today. Pt may be approaching readiness for discharge in the next day or so - likely to TCU it seems.      Suspected Hospital acquired delirium - improving  Possible post concussive syndrome?  Hallucinations  *Patient's niece was present in the room when assessed 3/3.  It was reported patient resided independently and has some slight memory issues (began ~ 6 months ago) but currently is far from her baseline.    *Discussion with House LYDIA and nursing staff 3/3; patient appears to be hallucinating and responding to internal stimuli.    *3/3; patient believes she is at [facility].  Was very slow to answer questions regarding her name but was able to answer appropriately.  She knew it was March 2024 and that Karyn is the current president.  She was unable to state who her niece was.    -  Delirium prevention:              --Reorient patient at each interaction.  --Give patient window bed if possible.  --Keep room lights on and blinds open during day (8am-8pm), low lights 8pm-8am.  --Minimize interruptions of sleep at night (consolidate vitals, nursing assessments, medications).  -  PRN PO Seroquel available.  - considerably improved 3/5 and 3/6. Conversant and oriented to self, place, president/year. Forgetful of situation at times. Niece Christina reports she is greatly improved on 3/5. Neurology has not found etiology of symptoms otherwise - EEG fairly unremarkable with some mild generalized slowing, Head CT unrevealing, B12 WNL, folate WNL, TSH WNL. Agree with neurology, could be indicative of post concussion.  Overall her improvement is reassuring and further therapy and evaluation at TCU and with PCP is recommended.     Fall (Suspect mechanical but patient doesn't entirely remember events)  Left arm skin tear with fall  Rhabdomyolysis - improving/resolving  *CK  trend: 687-->566-->687 --> 1,158 3/4 --> 343 3/5, stopped IVF, encourage adequate PO  - PT consulted and rec TCU, SW/CM consulted and  "appreciated - coordinated with patient and [facility] mina Vasquez  - Wound consult for skin tear (not available over the weekends).      Urinary retention  Ornelas placed. TOV attempted but failed. Straight cath attempted but ultimately required ornelas re-placement.   - ornelas continued at discharge  - TOV in 1-2 weeks, may need urology referral     Troponin elevation, likely demand ischemia, possibly in relation to rhabdomyolysis  - Troponin trend:               - 47-->50-->44.    -  ECHO EF 65-70%, normal LV systolic function, trace AR, difficult study  -  Monitor on telemetry.    - no anginal equivalents appreciated or reported     Acute hypotension (Resolved)  Lactic acidosis (Resolved)  Anion gap metabolic acidosis (Resolved)  Known HTN  *RRT called early morning 3/3 due to AMS and hypotension.  Briefly discussed with House LYDIA, Marta Barrera CNP.  Please see RRT note for full details.                 **Noted elevated lactic acid level of 4.2 and BP of 87/46.                 **Received IV fluid bolus 1000 ml NS.    -  Lactic acid level monitor:               --1.6-->4.2-->2.1  -  Resumed on PTA metoprolol 100 mg BID with hold parameters.    - 3/4 RESUME PTA amlodipine.   - 3/5 resume PTA lisinopril (hold if SBP < 110)  -  IVF stopped, encourage PO intake  - TCU MD / PCP follow up for ongoing management after discharge     Hypokalemia  K 3.2. Replace with oral supplement.  - followed and replaced  - BMP normalized prior to discharge     History of gastric ulcer with GI bleed 2017  -  No acute GI complaints.  No interventions.       Hyperlipidemia  -  Hold PTA simvastatin 40 mg at bedtime given rhabdomyolysis.       Stage 1 Pressure Injury of Spine and Deep Tissue Pressure Injury of Left Lateral Ankle   WOC RN following - management at their direction  - continue wound cares at discharge     Physical deconditioning  -  PT consult requested - discharge to TCU\"     Overall stabilized and patient discharged to the above TCU " in stable condition for rehab.    Today: Patient being seen for TCU follow-up visit.  Niece at the bedside.  Patient does report she is doing fine.  Niece reports patient was somewhat weaker yesterday but she is better today.  UA/UC pending.  Patient denies  symptoms whatsoever.  She still has Guzman catheter in place even though order was given on 3/7 to disconue.  Discussed with the nurse to discontinue Guzman now.  Patient reports she is ambulating short distances therapy.  No falls in TCU.  Appetite not so great.  Followed by RD and on dietary supplements.  Disposition pending at this time.  Staff without acute concerns.    Allergies, and PMH/PSH reviewed in [employer] today.  REVIEW OF SYSTEMS:  4 point ROS including Respiratory, CV, GI and , other than that noted in the HPI,  is negative    Objective:   /65   Pulse 78   Temp 97.8  F (36.6  C)   Resp 16   Ht 1.524 m (5')   Wt 55.4 kg (122 lb 1.6 oz)   LMP  (LMP Unknown)   SpO2 95%   BMI 23.85 kg/m      Exam:  GENERAL APPEARANCE: In no acute distress  RESPIRATORY: Clear to auscultation, even and unlabored, symmetrical chest wall expansion  CARDIOVASCULAR: RRR, no peripheral edema, S1/S2 normal  GASTROINTESTINAL: Soft, nontender, nondistended, bowel sounds present x4 quadrants  NEUROLOGICAL: Alert and oriented, forgetful  PSYCHOLOGICAL: Calm      Recent labs in EPIC reviewed by me today.     Assessment/Plan:  Fall  Rhabdomyolysis, improved  Generalized weakness  Deconditioning  Frailty  -Fell at home and was on the ground for at least 4-6 hours per chart review, independent at baseline, UA was unremarkable inpatient, troponin 47 and total  on presentation (343 on 3/5) to the hospital, imaging showed moderate-sized hiatal hernia, slightly increased density and widening of the left C3-C4 foramen (incidental), post-op bilateral VAISHALI, she is alert and oriented with some forgetfulness in TCU  -Continue therapies, fall precautions, safe disposition      Suspected Hospital acquired delirium, resolved   ?post concussive syndrome, improved  Hallucinations, resolved  -Recent decline in memory per chart review, slow speech, alert and oriented with some forgetfulness, has PRN Seroquel available (not using), mood appropriate,  B12 folate and TSH normal in the hospital  -Continue current care, monitor mental status    Urinary tension  -Discontinue Guzman and PVR,  reinsert Guzman and urology referral if fails TOV         MED REC REQUIRED  Post Medication Reconciliation Status: medication reconcilation previously completed during another office visit            Electronically signed by:   Anna Edwards,ROSI,APRN,TRISTENP-BC.             The above note was completed in part using Dragon voice recognition software. Although reviewed after completion, some word and grammatical errors may occur. Please contact the author of this note with any questions.

## 2024-03-12 NOTE — TELEPHONE ENCOUNTER
[facility] Geriatrics Lab Note     Provider: BRITTANI Majano CNP  Facility: [facility] Facility Type:  TCU    Allergies   Allergen Reactions    Aminoglycosides      neosporin onintment - rash    Hctz Hives    Penicillins     Sulfa Antibiotics        Labs Reviewed by provider: MILTON     Verbal Order/Direction given by Provider: No treatment at this time.      Provider giving Order:  BRITTANI Majano CNP    Verbal Order given to: Virgil([phone])    Faizan Radford RN

## 2024-03-14 NOTE — LETTER
"    3/14/2024        RE: Lori Fall  [address]  [facility] [address]        [facility] GERIATRICS    Chief Complaint   Patient presents with     RECHECK     HPI:  Lori Fall is a 89 year old  (6/22/1929), who is being seen today for an episodic care visit at: [facility] (U) [[id number]]. Today's concern is: TCU Follow Up     Patient admitted to the above facility from  [facility]. Hospital stay 3/2/26 through 3/6/24..      [facility]  Hospitalist Discharge Summary    Date of Admission:  3/2/2024  Date of Discharge:  3/6/2024     Hospital course:  \" 89 year old female who was admitted on 3/2/2024 and has history significant for HTN, HLP, Osteopenia, History of PUD with GI bleed who was registered to short-stay/observation due to fall resulting in acute rhabdomyolysis.       Patient presented to the [facility] ED following a fall at home.  Patient could not provide much detail regarding the fall but believe it was mechanical in nature as she thinks she tripped.  She was unable to get up off the ground and remained on the ground for at least 4-6 hours.       Work-up in the ED included a CMP that revealed a total protein of 6.2 and glucose of 104 otherwise within normal limits.  CBC with diff was unremarkable.  UA had mucus present otherwise was negative.    POCT lactic acid level was within normal limits at 1.6.  POCT VBG with O2 sat of 69 otherwise within normal limits.  Troponin T elevated at 47.  Total CK elevated at 687.       Imaging studies completed in the ED included a head CT w/o contrast, cervical spine CT w/o contrast 2 view chest Xray and 1/2 views pelvic  Xray that were negative for acute findings.  These did reveal moderate-sized hiatal hernia, slightly increased density and widening of the left C3-C4 foramen (incidental), post-op bilateral VAISHALI.       As of 3/5 and 3/6, she is improving and more coherent and " conversant - confirmed by niconnie and HCA Christina at bedside. We will have further eval with therapies and update from neurology today. Pt may be approaching readiness for discharge in the next day or so - likely to TCU it seems.      Suspected Hospital acquired delirium - improving  Possible post concussive syndrome?  Hallucinations  *Patient's niece was present in the room when assessed 3/3.  It was reported patient resided independently and has some slight memory issues (began ~ 6 months ago) but currently is far from her baseline.    *Discussion with House LYDIA and nursing staff 3/3; patient appears to be hallucinating and responding to internal stimuli.    *3/3; patient believes she is at [employer].  Was very slow to answer questions regarding her name but was able to answer appropriately.  She knew it was March 2024 and that Karyn is the current president.  She was unable to state who her niece was.    -  Delirium prevention:              --Reorient patient at each interaction.  --Give patient window bed if possible.  --Keep room lights on and blinds open during day (8am-8pm), low lights 8pm-8am.  --Minimize interruptions of sleep at night (consolidate vitals, nursing assessments, medications).  -  PRN PO Seroquel available.  - considerably improved 3/5 and 3/6. Conversant and oriented to self, place, president/year. Forgetful of situation at times. [facility] Christina reports she is greatly improved on 3/5. Neurology has not found etiology of symptoms otherwise - EEG fairly unremarkable with some mild generalized slowing, Head CT unrevealing, B12 WNL, folate WNL, TSH WNL. Agree with neurology, could be indicative of post concussion.  Overall her improvement is reassuring and further therapy and evaluation at TCU and with PCP is recommended.     Fall (Suspect mechanical but patient doesn't entirely remember events)  Left arm skin tear with fall  Rhabdomyolysis - improving/resolving  *CK  trend: 687-->566-->687 --> 1,158 3/4 -->  343 3/5, stopped IVF, encourage adequate PO  - PT consulted and rec TCU, SW/CM consulted and appreciated - coordinated with patient and H[facility] mina Vasquez  - Wound consult for skin tear (not available over the weekends).      Urinary retention  Ornelas placed. TOV attempted but failed. Straight cath attempted but ultimately required ornelas re-placement.   - ornelas continued at discharge  - TOV in 1-2 weeks, may need urology referral     Troponin elevation, likely demand ischemia, possibly in relation to rhabdomyolysis  - Troponin trend:               - 47-->50-->44.    -  ECHO EF 65-70%, normal LV systolic function, trace AR, difficult study  -  Monitor on telemetry.    - no anginal equivalents appreciated or reported     Acute hypotension (Resolved)  Lactic acidosis (Resolved)  Anion gap metabolic acidosis (Resolved)  Known HTN  *RRT called early morning 3/3 due to AMS and hypotension.  Briefly discussed with House LYDIA, Marta Barrera CNP.  Please see RRT note for full details.                 **Noted elevated lactic acid level of 4.2 and BP of 87/46.                 **Received IV fluid bolus 1000 ml NS.    -  Lactic acid level monitor:               --1.6-->4.2-->2.1  -  Resumed on PTA metoprolol 100 mg BID with hold parameters.    - 3/4 RESUME PTA amlodipine.   - 3/5 resume PTA lisinopril (hold if SBP < 110)  -  IVF stopped, encourage PO intake  - TCU MD / PCP follow up for ongoing management after discharge     Hypokalemia  K 3.2. Replace with oral supplement.  - followed and replaced  - BMP normalized prior to discharge     History of gastric ulcer with GI bleed 2017  -  No acute GI complaints.  No interventions.       Hyperlipidemia  -  Hold PTA simvastatin 40 mg at bedtime given rhabdomyolysis.       Stage 1 Pressure Injury of Spine and Deep Tissue Pressure Injury of Left Lateral Ankle   WOC RN following - management at their direction  - continue wound cares at discharge     Physical deconditioning  -  PT consult  "requested - discharge to TCU\"     Overall stabilized and patient discharged to the above TCU in stable condition for rehab.    Today: Patient being seen for TCU follow-up visit.  Alert and awake.  Appetite improved.  Working with therapy and tolerating.  Voiding freely status post Guzman catheter. PVR unremarkable per staff.  Patient endorses mild bladder discomfort. Recent UC showed    >100,000 CFU/mL Raoultella ornithinolytica/planticola Abnormal    .  Unclear if the sample was taken from the catheter.  I will order another UA/UC given mild bladder discomfort.  Afebrile.  No back pain.  Niece at the bedside and all concerns addressed.    Allergies, and PMH/PSH reviewed in [facility] today.  REVIEW OF SYSTEMS:  4 point ROS including Respiratory, CV, GI and , other than that noted in the HPI,  is negative    Objective:   /65   Pulse 79   Temp 98  F (36.7  C)   Resp 17   Ht 1.524 m (5')   Wt 50.3 kg (111 lb)   LMP  (LMP Unknown)   SpO2 96%   BMI 21.68 kg/m      Exam:  GENERAL APPEARANCE: NAD  RESPIRATORY: Clear to auscultation, even and unlabored, symmetrical chest wall expansion  CARDIOVASCULAR: RRR, no peripheral edema, S1/S2 normal  GASTROINTESTINAL: Soft, nontender, nondistended, bowel sounds present x4 quadrants  NEUROLOGICAL: Alert and oriented, forgetful  PSYCHOLOGICAL: Calm    Recent labs in [facility] reviewed by me today.     Assessment/Plan:  Fall  Rhabdomyolysis, improved  Generalized weakness  Deconditioning  Frailty  -Fell at home and was on the ground for at least 4-6 hours per chart review, independent at baseline, UA was unremarkable inpatient, troponin 47 and total  on presentation (343 on 3/5) to the hospital, imaging showed moderate-sized hiatal hernia, slightly increased density and widening of the left C3-C4 foramen (incidental), post-op bilateral VAISHALI, she is alert and oriented with some forgetfulness in TCU  -Continue therapies, fall precautions, safe disposition     Suspected Hospital " acquired delirium, resolved   ?post concussive syndrome, improved  Hallucinations, resolved  -Recent decline in memory per chart review, slow speech, alert and oriented with some forgetfulness, has PRN Seroquel available (not using), mood appropriate,  B12 folate and TSH normal in the hospital  -Continue current care, monitor mental status     Urinary tension  -Voiding post ornelas  -Continue PVR as needed       MED REC REQUIRED  Post Medication Reconciliation Status: medication reconcilation previously completed during another office visit         Electronically signed by:   Anna Edwards DNP,BRITTANI,MATT-BC.             The above note was completed in part using Dragon voice recognition software. Although reviewed after completion, some word and grammatical errors may occur. Please contact the author of this note with any questions.           Sincerely,        BRITTANI Cardenas CNP

## 2024-03-14 NOTE — PROGRESS NOTES
"[facility] GERIATRICS    Chief Complaint   Patient presents with    RECHECK     HPI:  Lori Fall is a 89 year old  (6/22/1929), who is being seen today for an episodic care visit at: [facility] (U) [[id number]]. Today's concern is: TCU Follow Up     Patient admitted to the above facility from  [facility]. Hospital stay 3/2/26 through 3/6/24..      [facility]  Hospitalist Discharge Summary    Date of Admission:  3/2/2024  Date of Discharge:  3/6/2024     Hospital course:  \" 89 year old female who was admitted on 3/2/2024 and has history significant for HTN, HLP, Osteopenia, History of PUD with GI bleed who was registered to short-stay/observation due to fall resulting in acute rhabdomyolysis.       Patient presented to the [facility] ED following a fall at home.  Patient could not provide much detail regarding the fall but believe it was mechanical in nature as she thinks she tripped.  She was unable to get up off the ground and remained on the ground for at least 4-6 hours.       Work-up in the ED included a CMP that revealed a total protein of 6.2 and glucose of 104 otherwise within normal limits.  CBC with diff was unremarkable.  UA had mucus present otherwise was negative.    POCT lactic acid level was within normal limits at 1.6.  POCT VBG with O2 sat of 69 otherwise within normal limits.  Troponin T elevated at 47.  Total CK elevated at 687.       Imaging studies completed in the ED included a head CT w/o contrast, cervical spine CT w/o contrast 2 view chest Xray and 1/2 views pelvic  Xray that were negative for acute findings.  These did reveal moderate-sized hiatal hernia, slightly increased density and widening of the left C3-C4 foramen (incidental), post-op bilateral VAISHALI.       As of 3/5 and 3/6, she is improving and more coherent and conversant - confirmed by niece and HCA Christina at bedside. We will have further eval with therapies " and update from neurology today. Pt may be approaching readiness for discharge in the next day or so - likely to TCU it seems.      Suspected Hospital acquired delirium - improving  Possible post concussive syndrome?  Hallucinations  *Patient's niece was present in the room when assessed 3/3.  It was reported patient resided independently and has some slight memory issues (began ~ 6 months ago) but currently is far from her baseline.    *Discussion with House LYDIA and nursing staff 3/3; patient appears to be hallucinating and responding to internal stimuli.    *3/3; patient believes she is at [employer].  Was very slow to answer questions regarding her name but was able to answer appropriately.  She knew it was March 2024 and that Karyn is the current president.  She was unable to state who her niece was.    -  Delirium prevention:              --Reorient patient at each interaction.  --Give patient window bed if possible.  --Keep room lights on and blinds open during day (8am-8pm), low lights 8pm-8am.  --Minimize interruptions of sleep at night (consolidate vitals, nursing assessments, medications).  -  PRN PO Seroquel available.  - considerably improved 3/5 and 3/6. Conversant and oriented to self, place, president/year. Forgetful of situation at times. Niece Christina reports she is greatly improved on 3/5. Neurology has not found etiology of symptoms otherwise - EEG fairly unremarkable with some mild generalized slowing, Head CT unrevealing, B12 WNL, folate WNL, TSH WNL. Agree with neurology, could be indicative of post concussion.  Overall her improvement is reassuring and further therapy and evaluation at TCU and with PCP is recommended.     Fall (Suspect mechanical but patient doesn't entirely remember events)  Left arm skin tear with fall  Rhabdomyolysis - improving/resolving  *CK  trend: 687-->566-->687 --> 1,158 3/4 --> 343 3/5, stopped IVF, encourage adequate PO  - PT consulted and rec TCU, SW/CM consulted and  "appreciated - coordinated with patient and [facility] mina Vasquez  - Wound consult for skin tear (not available over the weekends).      Urinary retention  Ornelas placed. TOV attempted but failed. Straight cath attempted but ultimately required ornelas re-placement.   - ornelas continued at discharge  - TOV in 1-2 weeks, may need urology referral     Troponin elevation, likely demand ischemia, possibly in relation to rhabdomyolysis  - Troponin trend:               - 47-->50-->44.    -  ECHO EF 65-70%, normal LV systolic function, trace AR, difficult study  -  Monitor on telemetry.    - no anginal equivalents appreciated or reported     Acute hypotension (Resolved)  Lactic acidosis (Resolved)  Anion gap metabolic acidosis (Resolved)  Known HTN  *RRT called early morning 3/3 due to AMS and hypotension.  Briefly discussed with House LYDIA, Marta Barrera CNP.  Please see RRT note for full details.                 **Noted elevated lactic acid level of 4.2 and BP of 87/46.                 **Received IV fluid bolus 1000 ml NS.    -  Lactic acid level monitor:               --1.6-->4.2-->2.1  -  Resumed on PTA metoprolol 100 mg BID with hold parameters.    - 3/4 RESUME PTA amlodipine.   - 3/5 resume PTA lisinopril (hold if SBP < 110)  -  IVF stopped, encourage PO intake  - TCU MD / PCP follow up for ongoing management after discharge     Hypokalemia  K 3.2. Replace with oral supplement.  - followed and replaced  - BMP normalized prior to discharge     History of gastric ulcer with GI bleed 2017  -  No acute GI complaints.  No interventions.       Hyperlipidemia  -  Hold PTA simvastatin 40 mg at bedtime given rhabdomyolysis.       Stage 1 Pressure Injury of Spine and Deep Tissue Pressure Injury of Left Lateral Ankle   WOC RN following - management at their direction  - continue wound cares at discharge     Physical deconditioning  -  PT consult requested - discharge to TCU\"     Overall stabilized and patient discharged to the above TCU " in stable condition for rehab.    Today: Patient being seen for TCU follow-up visit.  Alert and awake.  Appetite improved.  Working with therapy and tolerating.  Voiding freely status post Guzman catheter. PVR unremarkable per staff.  Patient endorses mild bladder discomfort. Recent UC showed    >100,000 CFU/mL Raoultella ornithinolytica/planticola Abnormal    .  Unclear if the sample was taken from the catheter.  I will order another UA/UC given mild bladder discomfort.  Afebrile.  No back pain.  Niece at the bedside and all concerns addressed.    Allergies, and PMH/PSH reviewed in [facility] today.  REVIEW OF SYSTEMS:  4 point ROS including Respiratory, CV, GI and , other than that noted in the HPI,  is negative    Objective:   /65   Pulse 79   Temp 98  F (36.7  C)   Resp 17   Ht 1.524 m (5')   Wt 50.3 kg (111 lb)   LMP  (LMP Unknown)   SpO2 96%   BMI 21.68 kg/m      Exam:  GENERAL APPEARANCE: NAD  RESPIRATORY: Clear to auscultation, even and unlabored, symmetrical chest wall expansion  CARDIOVASCULAR: RRR, no peripheral edema, S1/S2 normal  GASTROINTESTINAL: Soft, nontender, nondistended, bowel sounds present x4 quadrants  NEUROLOGICAL: Alert and oriented, forgetful  PSYCHOLOGICAL: Calm    Recent labs in [facility] reviewed by me today.     Assessment/Plan:  Fall  Rhabdomyolysis, improved  Generalized weakness  Deconditioning  Frailty  -Fell at home and was on the ground for at least 4-6 hours per chart review, independent at baseline, UA was unremarkable inpatient, troponin 47 and total  on presentation (343 on 3/5) to the hospital, imaging showed moderate-sized hiatal hernia, slightly increased density and widening of the left C3-C4 foramen (incidental), post-op bilateral VAISHALI, she is alert and oriented with some forgetfulness in TCU  -Continue therapies, fall precautions, safe disposition     Suspected Hospital acquired delirium, resolved   ?post concussive syndrome, improved  Hallucinations,  resolved  -Recent decline in memory per chart review, slow speech, alert and oriented with some forgetfulness, has PRN Seroquel available (not using), mood appropriate,  B12 folate and TSH normal in the hospital  -Continue current care, monitor mental status     Urinary tension  -Voiding post ornelas  -Continue PVR as needed       MED REC REQUIRED  Post Medication Reconciliation Status: medication reconcilation previously completed during another office visit         Electronically signed by:   Anna Edwards,ROSI,APRN,TRISTENP-BC.             The above note was completed in part using Dragon voice recognition software. Although reviewed after completion, some word and grammatical errors may occur. Please contact the author of this note with any questions.

## 2024-03-18 NOTE — TELEPHONE ENCOUNTER
[facility] Geriatrics Lab Note     Provider: BRITTANI Majano CNP  Facility: [facility] Type:  TCU    Allergies   Allergen Reactions    Aminoglycosides      neosporin onintment - rash    Hctz Hives    Penicillins     Sulfa Antibiotics        Labs Reviewed by provider: ADRIENNE     Verbal Order/Direction given by Provider: No new orders.      Provider giving Order:  BRITTANI Majnao CNP    Verbal Order given to: Virgil Radford RN

## 2024-03-18 NOTE — PROGRESS NOTES
"[facility] GERIATRICS    Chief Complaint   Patient presents with    RECHECK     HPI:  Lori Fall is a 89 year old  (6/22/1929), who is being seen today for an episodic care visit at: [facility] (U) [[id number]]. Today's concern is: TCU Follow Up     Patient admitted to the above facility from  [facility]. Hospital stay 3/2/26 through 3/6/24..      [facility]  Hospitalist Discharge Summary    Date of Admission:  3/2/2024  Date of Discharge:  3/6/2024     Hospital course:  \" 89 year old female who was admitted on 3/2/2024 and has history significant for HTN, HLP, Osteopenia, History of PUD with GI bleed who was registered to short-stay/observation due to fall resulting in acute rhabdomyolysis.       Patient presented to the [facility] ED following a fall at home.  Patient could not provide much detail regarding the fall but believe it was mechanical in nature as she thinks she tripped.  She was unable to get up off the ground and remained on the ground for at least 4-6 hours.       Work-up in the ED included a CMP that revealed a total protein of 6.2 and glucose of 104 otherwise within normal limits.  CBC with diff was unremarkable.  UA had mucus present otherwise was negative.    POCT lactic acid level was within normal limits at 1.6.  POCT VBG with O2 sat of 69 otherwise within normal limits.  Troponin T elevated at 47.  Total CK elevated at 687.       Imaging studies completed in the ED included a head CT w/o contrast, cervical spine CT w/o contrast 2 view chest Xray and 1/2 views pelvic  Xray that were negative for acute findings.  These did reveal moderate-sized hiatal hernia, slightly increased density and widening of the left C3-C4 foramen (incidental), post-op bilateral VAISHALI.       As of 3/5 and 3/6, she is improving and more coherent and conversant - confirmed by niece and HCA Christina at bedside. We will have further eval with therapies " and update from neurology today. Pt may be approaching readiness for discharge in the next day or so - likely to TCU it seems.      Suspected Hospital acquired delirium - improving  Possible post concussive syndrome?  Hallucinations  *Patient's niece was present in the room when assessed 3/3.  It was reported patient resided independently and has some slight memory issues (began ~ 6 months ago) but currently is far from her baseline.    *Discussion with House LYDIA and nursing staff 3/3; patient appears to be hallucinating and responding to internal stimuli.    *3/3; patient believes she is at [employer].  Was very slow to answer questions regarding her name but was able to answer appropriately.  She knew it was March 2024 and that Karyn is the current president.  She was unable to state who her niece was.    -  Delirium prevention:              --Reorient patient at each interaction.  --Give patient window bed if possible.  --Keep room lights on and blinds open during day (8am-8pm), low lights 8pm-8am.  --Minimize interruptions of sleep at night (consolidate vitals, nursing assessments, medications).  -  PRN PO Seroquel available.  - considerably improved 3/5 and 3/6. Conversant and oriented to self, place, president/year. Forgetful of situation at times. Niece Christina reports she is greatly improved on 3/5. Neurology has not found etiology of symptoms otherwise - EEG fairly unremarkable with some mild generalized slowing, Head CT unrevealing, B12 WNL, folate WNL, TSH WNL. Agree with neurology, could be indicative of post concussion.  Overall her improvement is reassuring and further therapy and evaluation at TCU and with PCP is recommended.     Fall (Suspect mechanical but patient doesn't entirely remember events)  Left arm skin tear with fall  Rhabdomyolysis - improving/resolving  *CK  trend: 687-->566-->687 --> 1,158 3/4 --> 343 3/5, stopped IVF, encourage adequate PO  - PT consulted and rec TCU, SW/CM consulted and  "appreciated - coordinated with patient and [facility] mina Vasquez  - Wound consult for skin tear (not available over the weekends).      Urinary retention  Ornelas placed. TOV attempted but failed. Straight cath attempted but ultimately required ornelas re-placement.   - ornelas continued at discharge  - TOV in 1-2 weeks, may need urology referral     Troponin elevation, likely demand ischemia, possibly in relation to rhabdomyolysis  - Troponin trend:               - 47-->50-->44.    -  ECHO EF 65-70%, normal LV systolic function, trace AR, difficult study  -  Monitor on telemetry.    - no anginal equivalents appreciated or reported     Acute hypotension (Resolved)  Lactic acidosis (Resolved)  Anion gap metabolic acidosis (Resolved)  Known HTN  *RRT called early morning 3/3 due to AMS and hypotension.  Briefly discussed with House LYDIA, Marta Barrera CNP.  Please see RRT note for full details.                 **Noted elevated lactic acid level of 4.2 and BP of 87/46.                 **Received IV fluid bolus 1000 ml NS.    -  Lactic acid level monitor:               --1.6-->4.2-->2.1  -  Resumed on PTA metoprolol 100 mg BID with hold parameters.    - 3/4 RESUME PTA amlodipine.   - 3/5 resume PTA lisinopril (hold if SBP < 110)  -  IVF stopped, encourage PO intake  - TCU MD / PCP follow up for ongoing management after discharge     Hypokalemia  K 3.2. Replace with oral supplement.  - followed and replaced  - BMP normalized prior to discharge     History of gastric ulcer with GI bleed 2017  -  No acute GI complaints.  No interventions.       Hyperlipidemia  -  Hold PTA simvastatin 40 mg at bedtime given rhabdomyolysis.       Stage 1 Pressure Injury of Spine and Deep Tissue Pressure Injury of Left Lateral Ankle   WOC RN following - management at their direction  - continue wound cares at discharge     Physical deconditioning  -  PT consult requested - discharge to TCU\"     Overall stabilized and patient discharged to the above TCU " in stable condition for rehab.    Today: Patient being seen for TCU follow-up visit. Patient declining over the weekend.  Requiring mechanical transfers now. Poor appetite.  Niece at the bedside and they are opting comfort care measure only at this time.  Hospice referral placed and unit manager updated.    Allergies, and PMH/PSH reviewed in EPIC today.  REVIEW OF SYSTEMS:  4 point ROS including Respiratory, CV, GI and , other than that noted in the HPI,  is negative    Objective:   BP (!) 152/78   Pulse 76   Temp 97.2  F (36.2  C)   Resp 20   Ht 1.524 m (5')   Wt 50.4 kg (111 lb 3.2 oz)   LMP  (LMP Unknown)   SpO2 96%   BMI 21.72 kg/m      Exam:  GENERAL APPEARANCE: NAD, in bed, sleepy, minimal speech   RESPIRATORY: Diminished, even and unlabored, symmetrical chest wall expansion  CARDIOVASCULAR: RRR, no peripheral edema, S1/S2 normal  GASTROINTESTINAL: Soft, nontender, nondistended, hypoactive bowel sounds  NEUROLOGICAL: Sleepy  PSYCHOLOGICAL: Quite    Recent labs in [facility] reviewed by me today.     Assessment/Plan:  Poor appetite, acute  Fall  Rhabdomyolysis, improved  Generalized weakness  Deconditioning  Frailty  -Hospitalization course as above, patient declining and not progressing with therapy, family/patient opting comfort care  -Hospice eval and treat, continue diet for comfort         MED REC REQUIRED  Post Medication Reconciliation Status: medication reconcilation previously completed during another office visit         Electronically signed by:   Anna Edwards,ROSI,APRN,AGNP-BC.             The above note was completed in part using Dragon voice recognition software. Although reviewed after completion, some word and grammatical errors may occur. Please contact the author of this note with any questions.

## 2024-03-18 NOTE — LETTER
"    3/18/2024        RE: Lori Fall  [address]  [place] [address]        [facility] GERIATRICS    Chief Complaint   Patient presents with     RECHECK     HPI:  Lori Fall is a 89 year old  (6/22/1929), who is being seen today for an episodic care visit at: [facility] (U) [[id number]]. Today's concern is: TCU Follow Up     Patient admitted to the above facility from  [facility]. Hospital stay 3/2/26 through 3/6/24..      [facility]  Hospitalist Discharge Summary    Date of Admission:  3/2/2024  Date of Discharge:  3/6/2024     Hospital course:  \" 89 year old female who was admitted on 3/2/2024 and has history significant for HTN, HLP, Osteopenia, History of PUD with GI bleed who was registered to short-stay/observation due to fall resulting in acute rhabdomyolysis.       Patient presented to the [facility] ED following a fall at home.  Patient could not provide much detail regarding the fall but believe it was mechanical in nature as she thinks she tripped.  She was unable to get up off the ground and remained on the ground for at least 4-6 hours.       Work-up in the ED included a CMP that revealed a total protein of 6.2 and glucose of 104 otherwise within normal limits.  CBC with diff was unremarkable.  UA had mucus present otherwise was negative.    POCT lactic acid level was within normal limits at 1.6.  POCT VBG with O2 sat of 69 otherwise within normal limits.  Troponin T elevated at 47.  Total CK elevated at 687.       Imaging studies completed in the ED included a head CT w/o contrast, cervical spine CT w/o contrast 2 view chest Xray and 1/2 views pelvic  Xray that were negative for acute findings.  These did reveal moderate-sized hiatal hernia, slightly increased density and widening of the left C3-C4 foramen (incidental), post-op bilateral VAISHALI.       As of 3/5 and 3/6, she is improving and more coherent and " conversant - confirmed by niconnie and HCA Christina at bedside. We will have further eval with therapies and update from neurology today. Pt may be approaching readiness for discharge in the next day or so - likely to TCU it seems.      Suspected Hospital acquired delirium - improving  Possible post concussive syndrome?  Hallucinations  *Patient's niece was present in the room when assessed 3/3.  It was reported patient resided independently and has some slight memory issues (began ~ 6 months ago) but currently is far from her baseline.    *Discussion with House LYDIA and nursing staff 3/3; patient appears to be hallucinating and responding to internal stimuli.    *3/3; patient believes she is at [employer].  Was very slow to answer questions regarding her name but was able to answer appropriately.  She knew it was March 2024 and that Karyn is the current president.  She was unable to state who her niece was.    -  Delirium prevention:              --Reorient patient at each interaction.  --Give patient window bed if possible.  --Keep room lights on and blinds open during day (8am-8pm), low lights 8pm-8am.  --Minimize interruptions of sleep at night (consolidate vitals, nursing assessments, medications).  -  PRN PO Seroquel available.  - considerably improved 3/5 and 3/6. Conversant and oriented to self, place, president/year. Forgetful of situation at times. Vidhi Christina reports she is greatly improved on 3/5. Neurology has not found etiology of symptoms otherwise - EEG fairly unremarkable with some mild generalized slowing, Head CT unrevealing, B12 WNL, folate WNL, TSH WNL. Agree with neurology, could be indicative of post concussion.  Overall her improvement is reassuring and further therapy and evaluation at TCU and with PCP is recommended.     Fall (Suspect mechanical but patient doesn't entirely remember events)  Left arm skin tear with fall  Rhabdomyolysis - improving/resolving  *CK  trend: 687-->566-->687 --> 1,158 3/4 -->  343 3/5, stopped IVF, encourage adequate PO  - PT consulted and rec TCU, SW/CM consulted and appreciated - coordinated with patient and [facility] mina Vasquez  - Wound consult for skin tear (not available over the weekends).      Urinary retention  Ornelas placed. TOV attempted but failed. Straight cath attempted but ultimately required ornelas re-placement.   - ornelas continued at discharge  - TOV in 1-2 weeks, may need urology referral     Troponin elevation, likely demand ischemia, possibly in relation to rhabdomyolysis  - Troponin trend:               - 47-->50-->44.    -  ECHO EF 65-70%, normal LV systolic function, trace AR, difficult study  -  Monitor on telemetry.    - no anginal equivalents appreciated or reported     Acute hypotension (Resolved)  Lactic acidosis (Resolved)  Anion gap metabolic acidosis (Resolved)  Known HTN  *RRT called early morning 3/3 due to AMS and hypotension.  Briefly discussed with House LYDIA, Marta Barrera CNP.  Please see RRT note for full details.                 **Noted elevated lactic acid level of 4.2 and BP of 87/46.                 **Received IV fluid bolus 1000 ml NS.    -  Lactic acid level monitor:               --1.6-->4.2-->2.1  -  Resumed on PTA metoprolol 100 mg BID with hold parameters.    - 3/4 RESUME PTA amlodipine.   - 3/5 resume PTA lisinopril (hold if SBP < 110)  -  IVF stopped, encourage PO intake  - TCU MD / PCP follow up for ongoing management after discharge     Hypokalemia  K 3.2. Replace with oral supplement.  - followed and replaced  - BMP normalized prior to discharge     History of gastric ulcer with GI bleed 2017  -  No acute GI complaints.  No interventions.       Hyperlipidemia  -  Hold PTA simvastatin 40 mg at bedtime given rhabdomyolysis.       Stage 1 Pressure Injury of Spine and Deep Tissue Pressure Injury of Left Lateral Ankle   WOC RN following - management at their direction  - continue wound cares at discharge     Physical deconditioning  -  PT consult  "requested - discharge to TCU\"     Overall stabilized and patient discharged to the above TCU in stable condition for rehab.    Today: Patient being seen for TCU follow-up visit. Patient declining over the weekend.  Requiring mechanical transfers now. Poor appetite.  Niece at the bedside and they are opting comfort care measure only at this time.  Hospice referral placed and unit manager updated.    Allergies, and PMH/PSH reviewed in EPIC today.  REVIEW OF SYSTEMS:  4 point ROS including Respiratory, CV, GI and , other than that noted in the HPI,  is negative    Objective:   BP (!) 152/78   Pulse 76   Temp 97.2  F (36.2  C)   Resp 20   Ht 1.524 m (5')   Wt 50.4 kg (111 lb 3.2 oz)   LMP  (LMP Unknown)   SpO2 96%   BMI 21.72 kg/m      Exam:  GENERAL APPEARANCE: NAD, in bed, sleepy, minimal speech   RESPIRATORY: Diminished, even and unlabored, symmetrical chest wall expansion  CARDIOVASCULAR: RRR, no peripheral edema, S1/S2 normal  GASTROINTESTINAL: Soft, nontender, nondistended, hypoactive bowel sounds  NEUROLOGICAL: Sleepy  PSYCHOLOGICAL: Quite    Recent labs in [facility] reviewed by me today.     Assessment/Plan:  Poor appetite, acute  Fall  Rhabdomyolysis, improved  Generalized weakness  Deconditioning  Frailty  -Hospitalization course as above, patient declining and not progressing with therapy, family/patient opting comfort care  -Hospice eval and treat, continue diet for comfort         MED REC REQUIRED  Post Medication Reconciliation Status: medication reconcilation previously completed during another office visit         Electronically signed by:   Anna Edwards DNP,BRITTANI,AGNP-BC.             The above note was completed in part using Dragon voice recognition software. Although reviewed after completion, some word and grammatical errors may occur. Please contact the author of this note with any questions.              Sincerely,        BRITTANI Cardenas CNP      "

## 2024-03-21 NOTE — PROGRESS NOTES
"[facility] GERIATRICS    Chief Complaint   Patient presents with    RECHECK     HPI:  Lori Fall is a 89 year old  (6/22/1929), who is being seen today for an episodic care visit at: [facility] ([facility]) [[id number]]. Today's concern is: TCU Follow Up     Patient admitted to the above facility from  [facility]. Hospital stay 3/2/26 through 3/6/24..      [facility]  Hospitalist Discharge Summary    Date of Admission:  3/2/2024  Date of Discharge:  3/6/2024     Hospital course:  \" 89 year old female who was admitted on 3/2/2024 and has history significant for HTN, HLP, Osteopenia, History of PUD with GI bleed who was registered to short-stay/observation due to fall resulting in acute rhabdomyolysis.       [facility] following a fall at home.  Patient could not provide much detail regarding the fall but believe it was mechanical in nature as she thinks she tripped.  She was unable to get up off the ground and remained on the ground for at least 4-6 hours.       Work-up in the ED included a CMP that revealed a total protein of 6.2 and glucose of 104 otherwise within normal limits.  CBC with diff was unremarkable.  UA had mucus present otherwise was negative.    POCT lactic acid level was within normal limits at 1.6.  POCT VBG with O2 sat of 69 otherwise within normal limits.  Troponin T elevated at 47.  Total CK elevated at 687.       Imaging studies completed in the ED included a head CT w/o contrast, cervical spine CT w/o contrast 2 view chest Xray and 1/2 views pelvic  Xray that were negative for acute findings.  These did reveal moderate-sized hiatal hernia, slightly increased density and widening of the left C3-C4 foramen (incidental), post-op bilateral VAISHALI.       As of 3/5 and 3/6, she is improving and more coherent and conversant - confirmed by niece and HCA Christina at bedside. We will have further eval with therapies " and update from neurology today. Pt may be approaching readiness for discharge in the next day or so - likely to TCU it seems.      Suspected Hospital acquired delirium - improving  Possible post concussive syndrome?  Hallucinations  *Patient's niece was present in the room when assessed 3/3.  It was reported patient resided independently and has some slight memory issues (began ~ 6 months ago) but currently is far from her baseline.    *Discussion with House LYDIA and nursing staff 3/3; patient appears to be hallucinating and responding to internal stimuli.    *3/3; patient believes she is at [employer].  Was very slow to answer questions regarding her name but was able to answer appropriately.  She knew it was March 2024 and that Karyn is the current president.  She was unable to state who her niece was.    -  Delirium prevention:              --Reorient patient at each interaction.  --Give patient window bed if possible.  --Keep room lights on and blinds open during day (8am-8pm), low lights 8pm-8am.  --Minimize interruptions of sleep at night (consolidate vitals, nursing assessments, medications).  -  PRN PO Seroquel available.  - considerably improved 3/5 and 3/6. Conversant and oriented to self, place, president/year. Forgetful of situation at times. Niece Christina reports she is greatly improved on 3/5. Neurology has not found etiology of symptoms otherwise - EEG fairly unremarkable with some mild generalized slowing, Head CT unrevealing, B12 WNL, folate WNL, TSH WNL. Agree with neurology, could be indicative of post concussion.  Overall her improvement is reassuring and further therapy and evaluation at TCU and with PCP is recommended.     Fall (Suspect mechanical but patient doesn't entirely remember events)  Left arm skin tear with fall  Rhabdomyolysis - improving/resolving  *CK  trend: 687-->566-->687 --> 1,158 3/4 --> 343 3/5, stopped IVF, encourage adequate PO  - PT consulted and rec TCU, SW/CM consulted and  "appreciated - coordinated with patient and [facility] mina Vasquez  - Wound consult for skin tear (not available over the weekends).      Urinary retention  Ornelas placed. TOV attempted but failed. Straight cath attempted but ultimately required ornelas re-placement.   - ornelas continued at discharge  - TOV in 1-2 weeks, may need urology referral     Troponin elevation, likely demand ischemia, possibly in relation to rhabdomyolysis  - Troponin trend:               - 47-->50-->44.    -  ECHO EF 65-70%, normal LV systolic function, trace AR, difficult study  -  Monitor on telemetry.    - no anginal equivalents appreciated or reported     Acute hypotension (Resolved)  Lactic acidosis (Resolved)  Anion gap metabolic acidosis (Resolved)  Known HTN  *RRT called early morning 3/3 due to AMS and hypotension.  Briefly discussed with House LYDIA, Marta Barrera CNP.  Please see RRT note for full details.                 **Noted elevated lactic acid level of 4.2 and BP of 87/46.                 **Received IV fluid bolus 1000 ml NS.    -  Lactic acid level monitor:               --1.6-->4.2-->2.1  -  Resumed on PTA metoprolol 100 mg BID with hold parameters.    - 3/4 RESUME PTA amlodipine.   - 3/5 resume PTA lisinopril (hold if SBP < 110)  -  IVF stopped, encourage PO intake  - TCU MD / PCP follow up for ongoing management after discharge     Hypokalemia  K 3.2. Replace with oral supplement.  - followed and replaced  - BMP normalized prior to discharge     History of gastric ulcer with GI bleed 2017  -  No acute GI complaints.  No interventions.       Hyperlipidemia  -  Hold PTA simvastatin 40 mg at bedtime given rhabdomyolysis.       Stage 1 Pressure Injury of Spine and Deep Tissue Pressure Injury of Left Lateral Ankle   WOC RN following - management at their direction  - continue wound cares at discharge     Physical deconditioning  -  PT consult requested - discharge to TCU\"     Overall stabilized and patient discharged to the above TCU " in stable condition for rehab.    Today: Patient being seen for TCU follow-up visit.  Patient found laying in bed.  Appears comfortable.  Pale.  zero to minimal oral intake.  Nearing end-of-life.  Followed by hospice.    Allergies, and PMH/PSH reviewed in EPIC today.  REVIEW OF SYSTEMS:  4 point ROS including Respiratory, CV, GI and , other than that noted in the HPI,  is negative    Objective:   /76   Pulse 80   Temp 96.9  F (36.1  C)   Resp 20   Ht 1.524 m (5')   Wt 50.4 kg (111 lb 3.2 oz)   LMP  (LMP Unknown)   SpO2 95%   BMI 21.72 kg/m      Exam:  GENERAL APPEARANCE: Pale, in bed   RESPIRATORY: Diminished, even and unlabored, symmetrical chest wall expansion  CARDIOVASCULAR: Distant  GASTROINTESTINAL: Hypoactive bowel sounds      Recent labs in [facility] reviewed by me today.     Assessment/Plan:  Hospice patient  Poor appetite  Fall  Rhabdomyolysis   Generalized weakness  Deconditioning  Frailty  -Followed by hospice  -Continue current care, continue to provide end-of-life care, hospice follow-up      MED REC REQUIRED  Post Medication Reconciliation Status: medication reconcilation previously completed during another office visit           Electronically signed by:   Anna Edwards,ROSI,APRN,AGNP-BC.             The above note was completed in part using Dragon voice recognition software. Although reviewed after completion, some word and grammatical errors may occur. Please contact the author of this note with any questions.

## 2024-03-21 NOTE — LETTER
"    3/21/2024        RE: Lori Fall  [address]  [facility] [id number]        [facility]    Chief Complaint   Patient presents with     RECHECK     HPI:  Lori Fall is a 89 year old  (6/22/1929), who is being seen today for an episodic care visit at: [facility] [[id number]]. Today's concern is: TCU Follow Up     Patient admitted to the above facility from  [facility]. Hospital stay 3/2/26 through 3/6/24..      [facility]  Hospitalist Discharge Summary    Date of Admission:  3/2/2024  Date of Discharge:  3/6/2024     Hospital course:  \" 89 year old female who was admitted on 3/2/2024 and has history significant for HTN, HLP, Osteopenia, History of PUD with GI bleed who was registered to short-stay/observation due to fall resulting in acute rhabdomyolysis.       Patient presented to the [facility] ED following a fall at home.  Patient could not provide much detail regarding the fall but believe it was mechanical in nature as she thinks she tripped.  She was unable to get up off the ground and remained on the ground for at least 4-6 hours.       Work-up in the ED included a CMP that revealed a total protein of 6.2 and glucose of 104 otherwise within normal limits.  CBC with diff was unremarkable.  UA had mucus present otherwise was negative.    POCT lactic acid level was within normal limits at 1.6.  POCT VBG with O2 sat of 69 otherwise within normal limits.  Troponin T elevated at 47.  Total CK elevated at 687.       Imaging studies completed in the ED included a head CT w/o contrast, cervical spine CT w/o contrast 2 view chest Xray and 1/2 views pelvic  Xray that were negative for acute findings.  These did reveal moderate-sized hiatal hernia, slightly increased density and widening of the left C3-C4 foramen (incidental), post-op bilateral VAISHALI.       As of 3/5 and 3/6, she is improving and more coherent and " conversant - confirmed by niconnie and [facility] Christina at bedside. We will have further eval with therapies and update from neurology today. Pt may be approaching readiness for discharge in the next day or so - likely to TCU it seems.      Suspected Hospital acquired delirium - improving  Possible post concussive syndrome?  Hallucinations  *Patient's niece was present in the room when assessed 3/3.  It was reported patient resided independently and has some slight memory issues (began ~ 6 months ago) but currently is far from her baseline.    *Discussion with House LYDIA and nursing staff 3/3; patient appears to be hallucinating and responding to internal stimuli.    *3/3; patient believes she is at [employer].  Was very slow to answer questions regarding her name but was able to answer appropriately.  She knew it was March 2024 and that Karyn is the current president.  She was unable to state who her niece was.    -  Delirium prevention:              --Reorient patient at each interaction.  --Give patient window bed if possible.  --Keep room lights on and blinds open during day (8am-8pm), low lights 8pm-8am.  --Minimize interruptions of sleep at night (consolidate vitals, nursing assessments, medications).  -  PRN PO Seroquel available.  - considerably improved 3/5 and 3/6. Conversant and oriented to self, place, president/year. Forgetful of situation at times. Vidhi Christina reports she is greatly improved on 3/5. Neurology has not found etiology of symptoms otherwise - EEG fairly unremarkable with some mild generalized slowing, Head CT unrevealing, B12 WNL, folate WNL, TSH WNL. Agree with neurology, could be indicative of post concussion.  Overall her improvement is reassuring and further therapy and evaluation at TCU and with PCP is recommended.     Fall (Suspect mechanical but patient doesn't entirely remember events)  Left arm skin tear with fall  Rhabdomyolysis - improving/resolving  *CK  trend: 687-->566-->687 --> 1,158 3/4 -->  343 3/5, stopped IVF, encourage adequate PO  - PT consulted and rec TCU, SW/CM consulted and appreciated - coordinated with patient and [facility] mina Vasquez  - Wound consult for skin tear (not available over the weekends).      Urinary retention  Ornelas placed. TOV attempted but failed. Straight cath attempted but ultimately required ornelas re-placement.   - ornelas continued at discharge  - TOV in 1-2 weeks, may need urology referral     Troponin elevation, likely demand ischemia, possibly in relation to rhabdomyolysis  - Troponin trend:               - 47-->50-->44.    -  ECHO EF 65-70%, normal LV systolic function, trace AR, difficult study  -  Monitor on telemetry.    - no anginal equivalents appreciated or reported     Acute hypotension (Resolved)  Lactic acidosis (Resolved)  Anion gap metabolic acidosis (Resolved)  Known HTN  *RRT called early morning 3/3 due to AMS and hypotension.  Briefly discussed with House LYDIA, Marta Barrera CNP.  Please see RRT note for full details.                 **Noted elevated lactic acid level of 4.2 and BP of 87/46.                 **Received IV fluid bolus 1000 ml NS.    -  Lactic acid level monitor:               --1.6-->4.2-->2.1  -  Resumed on PTA metoprolol 100 mg BID with hold parameters.    - 3/4 RESUME PTA amlodipine.   - 3/5 resume PTA lisinopril (hold if SBP < 110)  -  IVF stopped, encourage PO intake  - TCU MD / PCP follow up for ongoing management after discharge     Hypokalemia  K 3.2. Replace with oral supplement.  - followed and replaced  - BMP normalized prior to discharge     History of gastric ulcer with GI bleed 2017  -  No acute GI complaints.  No interventions.       Hyperlipidemia  -  Hold PTA simvastatin 40 mg at bedtime given rhabdomyolysis.       Stage 1 Pressure Injury of Spine and Deep Tissue Pressure Injury of Left Lateral Ankle   WOC RN following - management at their direction  - continue wound cares at discharge     Physical deconditioning  -  PT consult  "requested - discharge to TCU\"     Overall stabilized and patient discharged to the above TCU in stable condition for rehab.    Today: Patient being seen for TCU follow-up visit.  Patient found laying in bed.  Appears comfortable.  Pale.  zero to minimal oral intake.  Nearing end-of-life.  Followed by hospice.    Allergies, and PMH/PSH reviewed in EPIC today.  REVIEW OF SYSTEMS:  4 point ROS including Respiratory, CV, GI and , other than that noted in the HPI,  is negative    Objective:   /76   Pulse 80   Temp 96.9  F (36.1  C)   Resp 20   Ht 1.524 m (5')   Wt 50.4 kg (111 lb 3.2 oz)   LMP  (LMP Unknown)   SpO2 95%   BMI 21.72 kg/m      Exam:  GENERAL APPEARANCE: Pale, in bed   RESPIRATORY: Diminished, even and unlabored, symmetrical chest wall expansion  CARDIOVASCULAR: Distant  GASTROINTESTINAL: Hypoactive bowel sounds      Recent labs in EPIC reviewed by me today.     Assessment/Plan:  Hospice patient  Poor appetite  Fall  Rhabdomyolysis   Generalized weakness  Deconditioning  Frailty  -Followed by hospice  -Continue current care, continue to provide end-of-life care, hospice follow-up      MED REC REQUIRED  Post Medication Reconciliation Status: medication reconcilation previously completed during another office visit           Electronically signed by:   Anna Edwards DNP,BRITTANI,AGNP-BC.             The above note was completed in part using Dragon voice recognition software. Although reviewed after completion, some word and grammatical errors may occur. Please contact the author of this note with any questions.                 Sincerely,        BRITTANI Cardenas CNP      "

## (undated) RX ORDER — FENTANYL CITRATE 50 UG/ML
INJECTION, SOLUTION INTRAMUSCULAR; INTRAVENOUS
Status: DISPENSED
Start: 2017-05-07